# Patient Record
Sex: MALE | NOT HISPANIC OR LATINO | Employment: OTHER | URBAN - METROPOLITAN AREA
[De-identification: names, ages, dates, MRNs, and addresses within clinical notes are randomized per-mention and may not be internally consistent; named-entity substitution may affect disease eponyms.]

---

## 2017-08-07 DIAGNOSIS — E03.9 HYPOTHYROIDISM, UNSPECIFIED TYPE: ICD-10-CM

## 2017-08-07 NOTE — TELEPHONE ENCOUNTER
levothyroxine (SYNTHROID, LEVOTHROID) 88 MCG tablet 90 tablet 3 7/5/2016  No   Sig: Take 1 tablet (88 mcg) by mouth daily          Last Written Prescription Date: 07/05/2016  Last Quantity: 90, # refills: 3  Last Office Visit with [facility], [facility] or [facility] prescribing provider: 11/2016        TSH   Date Value Ref Range Status   06/21/2016 1.56 0.40 - 4.00 mU/L Final

## 2017-08-08 RX ORDER — LEVOTHYROXINE SODIUM 88 UG/1
TABLET ORAL
Qty: 30 TABLET | Refills: 0 | Status: SHIPPED | OUTPATIENT
Start: 2017-08-08 | End: 2017-09-28

## 2017-08-08 NOTE — TELEPHONE ENCOUNTER
Medication is being filled for 1 time refill only due to:  Patient needs to be seen because due for PE and labs.

## 2017-09-28 DIAGNOSIS — E03.9 HYPOTHYROIDISM, UNSPECIFIED TYPE: ICD-10-CM

## 2017-09-28 NOTE — TELEPHONE ENCOUNTER
Patient due for physical and labs for further refills        levothyroxine (SYNTHROID/LEVOTHROID) 88 MCG tablet     Last Written Prescription Date: 8/08/17  Last Quantity: 30, # refills: 0  Last Office Visit with G, P or [facility] prescribing provider: 11/03/16        TSH   Date Value Ref Range Status   06/21/2016 1.56 0.40 - 4.00 mU/L Final           Mary BOB(R)

## 2017-10-19 RX ORDER — LEVOTHYROXINE SODIUM 88 UG/1
TABLET ORAL
Qty: 30 TABLET | Refills: 0 | Status: SHIPPED | OUTPATIENT
Start: 2017-10-19 | End: 2017-11-21

## 2017-11-20 NOTE — PROGRESS NOTES
"  SUBJECTIVE:   CC: Sarkis Darnell is an 57 year old male who presents for preventative health visit.     Healthy Habits:    Do you get at least three servings of calcium containing foods daily (dairy, green leafy vegetables, etc.)? {YES/NO, DAIRY INTAKE:985431::\"yes\"}    Amount of exercise or daily activities, outside of work: {AMOUNT EXERCISE:424637}    Problems taking medications regularly {Yes /No default:307333::\"No\"}    Medication side effects: {Yes /No default.:566170::\"No\"}    Have you had an eye exam in the past two years? {YESNOBLANK:537273}    Do you see a dentist twice per year? {YESNOBLANK:718875}    Do you have sleep apnea, excessive snoring or daytime drowsiness?{YESNOBLANK:921762}    {Outside tests to abstract? :053655}    {additional problems to add (Optional):456878}    Today's PHQ-2 Score:   PHQ-2 ( 1999 Pfizer) 11/3/2016 7/5/2016   Q1: Little interest or pleasure in doing things 0 0   Q2: Feeling down, depressed or hopeless 0 0   PHQ-2 Score 0 0   Q1: Little interest or pleasure in doing things - -   Q2: Feeling down, depressed or hopeless - -   PHQ-2 Score - -     {PHQ-2 LOOK IN ASSESSMENTS :981403}  Abuse: Current or Past(Physical, Sexual or Emotional)- {YES/NO/NA:458042}  Do you feel safe in your environment - {YES/NO/NA:199302}    Social History   Substance Use Topics     Smoking status: Never Smoker     Smokeless tobacco: Never Used     Alcohol use No     {ETOH AUDIT:573752}    Last PSA:   PSA   Date Value Ref Range Status   07/29/2015 6.8 ng/mL Final       Reviewed orders with patient. Reviewed health maintenance and updated orders accordingly - {Yes/No:684585::\"Yes\"}  {Chronicprobdata (Optional):566783}    Reviewed and updated as needed this visit by clinical staff         Reviewed and updated as needed this visit by Provider        {HISTORY OPTIONS (Optional):677231}    ROS:  {MALE ROS-adult preventive care package:368936::\"C: NEGATIVE for fever, chills, change in weight\",\"I: NEGATIVE for " "worrisome rashes, moles or lesions\",\"E: NEGATIVE for vision changes or irritation\",\"ENT: NEGATIVE for ear, mouth and throat problems\",\"R: NEGATIVE for significant cough or SOB\",\"CV: NEGATIVE for chest pain, palpitations or peripheral edema\",\"GI: NEGATIVE for nausea, abdominal pain, heartburn, or change in bowel habits\",\" male: negative for dysuria, hematuria, decreased urinary stream, erectile dysfunction, urethral discharge\",\"M: NEGATIVE for significant arthralgias or myalgia\",\"N: NEGATIVE for weakness, dizziness or paresthesias\",\"P: NEGATIVE for changes in mood or affect\"}    OBJECTIVE:   There were no vitals taken for this visit.  EXAM:  {Exam Choices:679882}    ASSESSMENT/PLAN:   {Diag Picklist:785263}    COUNSELING:  {MALE COUNSELING MESSAGES:437320::\"Reviewed preventive health counseling, as reflected in patient instructions\"}    {BP Counseling- Complete if BP >= 120/80  (Optional):102975}     reports that he has never smoked. He has never used smokeless tobacco.  {Tobacco Cessation -- Complete if patient is a smoker (Optional):644767}  Estimated body mass index is 30.05 kg/(m^2) as calculated from the following:    Height as of 11/3/16: 5' 6.5\" (1.689 m).    Weight as of 11/3/16: 189 lb (85.7 kg).   {Weight Management Plan (ACO) Complete if BMI is abnormal-  Ages 18-64  BMI >24.9.  Age 65+ with BMI <23 or >30 (Optional):208438}    Counseling Resources:  ATP IV Guidelines  Pooled Cohorts Equation Calculator  FRAX Risk Assessment  ICSI Preventive Guidelines  Dietary Guidelines for Americans, 2010  USDA's MyPlate  ASA Prophylaxis  Lung CA Screening    Alejandro Lewis MD  [facility]  "

## 2017-11-21 ENCOUNTER — OFFICE VISIT (OUTPATIENT)
Dept: FAMILY MEDICINE | Facility: CLINIC | Age: 57
End: 2017-11-21
Payer: COMMERCIAL

## 2017-11-21 VITALS
BODY MASS INDEX: 30.07 KG/M2 | HEIGHT: 67 IN | OXYGEN SATURATION: 98 % | WEIGHT: 191.6 LBS | SYSTOLIC BLOOD PRESSURE: 122 MMHG | HEART RATE: 70 BPM | DIASTOLIC BLOOD PRESSURE: 78 MMHG | TEMPERATURE: 97.6 F

## 2017-11-21 DIAGNOSIS — N52.9 ERECTILE DYSFUNCTION, UNSPECIFIED ERECTILE DYSFUNCTION TYPE: ICD-10-CM

## 2017-11-21 DIAGNOSIS — E03.9 HYPOTHYROIDISM, UNSPECIFIED TYPE: ICD-10-CM

## 2017-11-21 DIAGNOSIS — E78.00 HYPERCHOLESTEREMIA: ICD-10-CM

## 2017-11-21 DIAGNOSIS — K21.9 GASTROESOPHAGEAL REFLUX DISEASE WITHOUT ESOPHAGITIS: ICD-10-CM

## 2017-11-21 DIAGNOSIS — R39.15 URINARY URGENCY: ICD-10-CM

## 2017-11-21 DIAGNOSIS — E66.9 NON MORBID OBESITY: ICD-10-CM

## 2017-11-21 DIAGNOSIS — N40.0 BENIGN NON-NODULAR PROSTATIC HYPERPLASIA WITHOUT LOWER URINARY TRACT SYMPTOMS: ICD-10-CM

## 2017-11-21 DIAGNOSIS — R97.20 ELEVATED PSA: ICD-10-CM

## 2017-11-21 DIAGNOSIS — M54.50 MIDLINE LOW BACK PAIN WITHOUT SCIATICA, UNSPECIFIED CHRONICITY: ICD-10-CM

## 2017-11-21 DIAGNOSIS — E55.9 VITAMIN D DEFICIENCY: ICD-10-CM

## 2017-11-21 DIAGNOSIS — Z00.00 ROUTINE GENERAL MEDICAL EXAMINATION AT A HEALTH CARE FACILITY: Primary | ICD-10-CM

## 2017-11-21 DIAGNOSIS — R19.4 BOWEL HABIT CHANGES: ICD-10-CM

## 2017-11-21 LAB
ALBUMIN SERPL-MCNC: 3.6 G/DL (ref 3.4–5)
ALBUMIN UR-MCNC: NEGATIVE MG/DL
ALP SERPL-CCNC: 94 U/L (ref 40–150)
ALT SERPL W P-5'-P-CCNC: 27 U/L (ref 0–70)
ANION GAP SERPL CALCULATED.3IONS-SCNC: 8 MMOL/L (ref 3–14)
APPEARANCE UR: CLEAR
AST SERPL W P-5'-P-CCNC: 17 U/L (ref 0–45)
BILIRUB SERPL-MCNC: 0.3 MG/DL (ref 0.2–1.3)
BILIRUB UR QL STRIP: NEGATIVE
BUN SERPL-MCNC: 15 MG/DL (ref 7–30)
CALCIUM SERPL-MCNC: 9.2 MG/DL (ref 8.5–10.1)
CHLORIDE SERPL-SCNC: 107 MMOL/L (ref 94–109)
CHOLEST SERPL-MCNC: 185 MG/DL
CO2 SERPL-SCNC: 25 MMOL/L (ref 20–32)
COLOR UR AUTO: YELLOW
CREAT SERPL-MCNC: 0.97 MG/DL (ref 0.66–1.25)
DEPRECATED CALCIDIOL+CALCIFEROL SERPL-MC: 21 UG/L (ref 20–75)
ERYTHROCYTE [DISTWIDTH] IN BLOOD BY AUTOMATED COUNT: 13.3 % (ref 10–15)
GFR SERPL CREATININE-BSD FRML MDRD: 80 ML/MIN/1.7M2
GLUCOSE SERPL-MCNC: 95 MG/DL (ref 70–99)
GLUCOSE UR STRIP-MCNC: NEGATIVE MG/DL
HCT VFR BLD AUTO: 43 % (ref 40–53)
HDLC SERPL-MCNC: 42 MG/DL
HGB BLD-MCNC: 14.8 G/DL (ref 13.3–17.7)
HGB UR QL STRIP: NEGATIVE
KETONES UR STRIP-MCNC: NEGATIVE MG/DL
LDLC SERPL CALC-MCNC: 124 MG/DL
LEUKOCYTE ESTERASE UR QL STRIP: NEGATIVE
MCH RBC QN AUTO: 30.1 PG (ref 26.5–33)
MCHC RBC AUTO-ENTMCNC: 34.4 G/DL (ref 31.5–36.5)
MCV RBC AUTO: 87 FL (ref 78–100)
NITRATE UR QL: NEGATIVE
NONHDLC SERPL-MCNC: 143 MG/DL
PH UR STRIP: 6 PH (ref 5–7)
PLATELET # BLD AUTO: 248 10E9/L (ref 150–450)
POTASSIUM SERPL-SCNC: 4.1 MMOL/L (ref 3.4–5.3)
PROT SERPL-MCNC: 7.2 G/DL (ref 6.8–8.8)
PSA SERPL-ACNC: 7.07 UG/L (ref 0–4)
RBC # BLD AUTO: 4.92 10E12/L (ref 4.4–5.9)
SODIUM SERPL-SCNC: 140 MMOL/L (ref 133–144)
SOURCE: NORMAL
SP GR UR STRIP: 1.02 (ref 1–1.03)
TRIGL SERPL-MCNC: 97 MG/DL
TSH SERPL DL<=0.005 MIU/L-ACNC: 2.24 MU/L (ref 0.4–4)
UROBILINOGEN UR STRIP-ACNC: 0.2 EU/DL (ref 0.2–1)
WBC # BLD AUTO: 6.1 10E9/L (ref 4–11)

## 2017-11-21 PROCEDURE — 80053 COMPREHEN METABOLIC PANEL: CPT | Performed by: INTERNAL MEDICINE

## 2017-11-21 PROCEDURE — 84443 ASSAY THYROID STIM HORMONE: CPT | Performed by: INTERNAL MEDICINE

## 2017-11-21 PROCEDURE — 85027 COMPLETE CBC AUTOMATED: CPT | Performed by: INTERNAL MEDICINE

## 2017-11-21 PROCEDURE — G0103 PSA SCREENING: HCPCS | Performed by: INTERNAL MEDICINE

## 2017-11-21 PROCEDURE — 81003 URINALYSIS AUTO W/O SCOPE: CPT | Performed by: INTERNAL MEDICINE

## 2017-11-21 PROCEDURE — 36415 COLL VENOUS BLD VENIPUNCTURE: CPT | Performed by: INTERNAL MEDICINE

## 2017-11-21 PROCEDURE — 99396 PREV VISIT EST AGE 40-64: CPT | Performed by: INTERNAL MEDICINE

## 2017-11-21 PROCEDURE — 99212 OFFICE O/P EST SF 10 MIN: CPT | Mod: 25 | Performed by: INTERNAL MEDICINE

## 2017-11-21 PROCEDURE — 82306 VITAMIN D 25 HYDROXY: CPT | Performed by: INTERNAL MEDICINE

## 2017-11-21 PROCEDURE — 80061 LIPID PANEL: CPT | Performed by: INTERNAL MEDICINE

## 2017-11-21 RX ORDER — LEVOTHYROXINE SODIUM 88 UG/1
TABLET ORAL
Qty: 90 TABLET | Refills: 3 | Status: SHIPPED | OUTPATIENT
Start: 2017-11-21 | End: 2018-11-29

## 2017-11-21 NOTE — LETTER
[place] [address] [facility]  [address]  [place]  [address]  Tel: [phone]    November 22, 2017    Sarkis Darnell  [address] [facility] [address]        Dear Mr. Darnell,    I am happy to report that your cbc or complete blood count is normal with no signs of anemia, leukemia or platelet abnormalities.  Your chemistry panel shows no signs of diabetes.  Your blood salts, kidney tests, liver tests, thyroid test, vitamin D level, urine test, and proteins are all fine.  Your psa is just a bit higher this time but not a lot.  2 years ago it was 6.8.  I am not worried but let's have you come back and repeat this in 6 months to be safe.    Your total cholesterol is 185 with the normal range being below 200.  Your HDL or good cholesterol is 42 with the normal range being above 40.  Your LDL or bad cholesterol is 124 with the normal range being below 130.  Overall these numbers are fine.    I am happy to bring you this overall excellent report.  Please be sure to exercise and try to get your weight down.  Please follow up in 6 months for the psa test, just call before you come.      If you have any further questions or problems, please contact our office.      Sincerely,    Alejandro Lewis MD/ Paola Arriaza CMA  Results for orders placed or performed in visit on 11/21/17   CBC with platelets   Result Value Ref Range    WBC 6.1 4.0 - 11.0 10e9/L    RBC Count 4.92 4.4 - 5.9 10e12/L    Hemoglobin 14.8 13.3 - 17.7 g/dL    Hematocrit 43.0 40.0 - 53.0 %    MCV 87 78 - 100 fl    MCH 30.1 26.5 - 33.0 pg    MCHC 34.4 31.5 - 36.5 g/dL    RDW 13.3 10.0 - 15.0 %    Platelet Count 248 150 - 450 10e9/L   Comprehensive metabolic panel   Result Value Ref Range    Sodium 140 133 - 144 mmol/L    Potassium 4.1 3.4 - 5.3 mmol/L    Chloride 107 94 - 109 mmol/L    Carbon Dioxide 25 20 - 32 mmol/L    Anion Gap 8 3 - 14 mmol/L    Glucose 95 70 - 99 mg/dL    Urea Nitrogen 15 7 - 30 mg/dL    Creatinine 0.97 0.66 - 1.25  mg/dL    GFR Estimate 80 >60 mL/min/1.7m2    GFR Estimate If Black >90 >60 mL/min/1.7m2    Calcium 9.2 8.5 - 10.1 mg/dL    Bilirubin Total 0.3 0.2 - 1.3 mg/dL    Albumin 3.6 3.4 - 5.0 g/dL    Protein Total 7.2 6.8 - 8.8 g/dL    Alkaline Phosphatase 94 40 - 150 U/L    ALT 27 0 - 70 U/L    AST 17 0 - 45 U/L   Lipid panel reflex to direct LDL Non-fasting   Result Value Ref Range    Cholesterol 185 <200 mg/dL    Triglycerides 97 <150 mg/dL    HDL Cholesterol 42 >39 mg/dL    LDL Cholesterol Calculated 124 (H) <100 mg/dL    Non HDL Cholesterol 143 (H) <130 mg/dL   Prostate spec antigen screen   Result Value Ref Range    PSA 7.07 (H) 0 - 4 ug/L   Vitamin D Deficiency   Result Value Ref Range    Vitamin D Deficiency screening 21 20 - 75 ug/L   TSH with free T4 reflex   Result Value Ref Range    TSH 2.24 0.40 - 4.00 mU/L   *UA reflex to Microscopic   Result Value Ref Range    Color Urine Yellow     Appearance Urine Clear     Glucose Urine Negative NEG^Negative mg/dL    Bilirubin Urine Negative NEG^Negative    Ketones Urine Negative NEG^Negative mg/dL    Specific Gravity Urine 1.025 1.003 - 1.035    Blood Urine Negative NEG^Negative    pH Urine 6.0 5.0 - 7.0 pH    Protein Albumin Urine Negative NEG^Negative mg/dL    Urobilinogen Urine 0.2 0.2 - 1.0 EU/dL    Nitrite Urine Negative NEG^Negative    Leukocyte Esterase Urine Negative NEG^Negative    Source Midstream Urine                Enclosure: Lab Results

## 2017-11-21 NOTE — MR AVS SNAPSHOT
After Visit Summary   11/21/2017    Sarkis Darnell    MRN: [id number]           Patient Information     Date Of Birth          1960        Visit Information        Provider Department      11/21/2017 8:30 AM Alejandro Lewis MD [facility]        Today's Diagnoses     Routine general medical examination at a health care facility    -  1    Vitamin D deficiency        Hypothyroidism, unspecified type        Gastroesophageal reflux disease without esophagitis        Erectile dysfunction, unspecified erectile dysfunction type        Elevated PSA        Benign non-nodular prostatic hyperplasia without lower urinary tract symptoms        Hypercholesteremia        Non morbid obesity        Midline low back pain without sciatica, unspecified chronicity        Urinary urgency        Bowel habit changes          Care Instructions      Preventive Health Recommendations  Male Ages 50 - 64    Yearly exam:             See your health care provider every year in order to  o   Review health changes.   o   Discuss preventive care.    o   Review your medicines if your doctor has prescribed any.     Have a cholesterol test every 5 years, or more frequently if you are at risk for high cholesterol/heart disease.     Have a diabetes test (fasting glucose) every three years. If you are at risk for diabetes, you should have this test more often.     Have a colonoscopy at age 50, or have a yearly FIT test (stool test). These exams will check for colon cancer.      Talk with your health care provider about whether or not a prostate cancer screening test (PSA) is right for you.    You should be tested each year for STDs (sexually transmitted diseases), if you re at risk.     Shots: Get a flu shot each year. Get a tetanus shot every 10 years.     Nutrition:    Eat at least 5 servings of fruits and vegetables daily.     Eat whole-grain bread, whole-wheat pasta and brown rice instead of white grains and rice.  "    Talk to your provider about Calcium and Vitamin D.     Lifestyle    Exercise for at least 150 minutes a week (30 minutes a day, 5 days a week). This will help you control your weight and prevent disease.     Limit alcohol to one drink per day.     No smoking.     Wear sunscreen to prevent skin cancer.     See your dentist every six months for an exam and cleaning.     See your eye doctor every 1 to 2 years.            Follow-ups after your visit        Who to contact     If you have questions or need follow up information about today's clinic visit or your schedule please contact [facility] directly at [phone].  Normal or non-critical lab and imaging results will be communicated to you by [employer], letter or phone within 4 business days after the clinic has received the results. If you do not hear from us within 7 days, please contact the clinic through [employer] or phone. If you have a critical or abnormal lab result, we will notify you by phone as soon as possible.  Submit refill requests through [employer] or call your pharmacy and they will forward the refill request to us. Please allow 3 business days for your refill to be completed.          Additional Information About Your Visit        [employer] Information     [employer] gives you secure access to your electronic health record. If you see a primary care provider, you can also send messages to your care team and make appointments. If you have questions, please call your primary care clinic.  If you do not have a primary care provider, please call [phone] and they will assist you.        Care EveryWhere ID     This is your Care EveryWhere ID. This could be used by other organizations to access your [facility] medical records  [id number]        Your Vitals Were     Pulse Temperature Height Pulse Oximetry BMI (Body Mass Index)       70 97.6  F (36.4  C) (Oral) 5' 6.5\" (1.689 m) 98% 30.46 kg/m2        Blood Pressure from Last 3 Encounters: "   11/21/17 122/78   11/03/16 101/66   07/05/16 122/75    Weight from Last 3 Encounters:   11/21/17 191 lb 9.6 oz (86.9 kg)   11/03/16 189 lb (85.7 kg)   07/05/16 185 lb 8 oz (84.1 kg)              We Performed the Following     *UA reflex to Microscopic     CBC with platelets     Comprehensive metabolic panel     Lipid panel reflex to direct LDL Non-fasting     OFFICE/OUTPT VISIT,EST,LEVL II     Prostate spec antigen screen     TSH with free T4 reflex     Vitamin D Deficiency          Today's Medication Changes          These changes are accurate as of: 11/21/17  9:04 AM.  If you have any questions, ask your nurse or doctor.               Stop taking these medicines if you haven't already. Please contact your care team if you have questions.     vitamin D 1000 UNITS capsule   Stopped by:  Alejandro Lewis MD                Where to get your medicines      These medications were sent to [id number] - [place], [place] - [address] AT [facility] [place] [facility]  [address], [place] [address]    Hours:  24-hours Phone:  [phone]     levothyroxine 88 MCG tablet                Primary Care Provider Office Phone # Fax #    Alejandro Lewis [phone] [address] [address]        Equal Access to Services     [facility] AH: Hadii aad ku hadasho Soomaali, waaxda luqadaha, qaybta kaalmada adeegyada, beatriz castaneda hayanni de la fuente . So [facility] [phone].    ATENCIÓN: Si habla español, tiene a charles disposición servicios gratuitos de asistencia lingüística. Alexia al [phone].    We comply with applicable federal civil rights laws and [place] laws. We do not discriminate on the basis of race, color, national origin, age, disability, sex, sexual orientation, or gender identity.            Thank you!     Thank you for choosing [facility]  for your care. Our goal is always to provide you with excellent care. Hearing  back from our patients is one way we can continue to improve our services. Please take a few minutes to complete the written survey that you may receive in the mail after your visit with us. Thank you!             Your Updated Medication List - Protect others around you: Learn how to safely use, store and throw away your medicines at www.disposemymeds.org.          This list is accurate as of: 11/21/17  9:04 AM.  Always use your most recent med list.                   Brand Name Dispense Instructions for use Diagnosis    levothyroxine 88 MCG tablet    SYNTHROID/LEVOTHROID    90 tablet    TAKE 1 TABLET(88 MCG) BY MOUTH DAILY    Hypothyroidism, unspecified type

## 2017-11-21 NOTE — PROGRESS NOTES
SUBJECTIVE:   CC: Sarkis Darnell is an 57 year old male who presents for preventative health visit.     He has multiple issues to discuss today    1. Occasionally low back paiin, low back paiin, not radiating, no leg t/n/w or loss of control of b/b.  NO f,c,s.  Comes and goes.  Occasionally mid back pain, not often    2. Bulge in abdomen when does sit ups, no pain    3. Occasionally hard area near umbilicus, no n/v, no fevers, chills or night sweats or weight loss.    4. Bowel movement consistency change, since lap renetta, no b/b stools    5. Urinary urgency at times, no b/b or dc.    He is not working out reg, weight an issue.      Physical   Annual:     Getting at least 3 servings of Calcium per day::  NO    Bi-annual eye exam::  Yes    Dental care twice a year::  Yes    Sleep apnea or symptoms of sleep apnea::  Daytime drowsiness    Diet::  Regular (no restrictions) and Other    Frequency of exercise::  1 day/week    Duration of exercise::  N/A    Taking medications regularly::  Yes    Medication side effects::  None    Additional concerns today::  No              Today's PHQ-2 Score:   PHQ-2 ( 1999 Pfizer) 11/21/2017   Q1: Little interest or pleasure in doing things 0   Q2: Feeling down, depressed or hopeless 0   PHQ-2 Score 0   Q1: Little interest or pleasure in doing things Not at all   Q2: Feeling down, depressed or hopeless Not at all   PHQ-2 Score 0       Abuse: Current or Past(Physical, Sexual or Emotional)- No  Do you feel safe in your environment - Yes    Social History   Substance Use Topics     Smoking status: Never Smoker     Smokeless tobacco: Never Used     Alcohol use No     The patient does not drink >3 drinks per day nor >7 drinks per week.                Past Medical History:      Past Medical History:   Diagnosis Date     BPH (benign prostatic hyperplasia) 2016    Dr. Gonzalez     Chest pain 1/14    neg est echo     Elevated PSA 2016    Dr. Gonzalez, bx neg     Erectile dysfunction     Dr. Gonzalez      Gastritis      Hypercholesteremia      Hypothyroid 2007     Normal colonoscopy 2010     Vitamin D deficiency 2009    Vitamin D2/D3 of 19             Past Surgical History:      Past Surgical History:   Procedure Laterality Date     LAPAROSCOPIC CHOLECYSTECTOMY N/A 11/16/2015    Procedure: LAPAROSCOPIC CHOLECYSTECTOMY;  Surgeon: Sivakumar Burr MD;  Location: [facility]             Social History:     Social History     Social History     Marital status:      Spouse name: N/A     Number of children: 2     Years of education: N/A     Occupational History      [employer]     Social History Main Topics     Smoking status: Never Smoker     Smokeless tobacco: Never Used     Alcohol use No     Drug use: No     Sexual activity: Not Currently     Other Topics Concern     Not on file     Social History Narrative             Family History:   reviewed         Allergies:     Allergies   Allergen Reactions     Dust Mites      Sneezing, water eyes             Medications:     Current Outpatient Prescriptions   Medication Sig Dispense Refill     levothyroxine (SYNTHROID/LEVOTHROID) 88 MCG tablet TAKE 1 TABLET(88 MCG) BY MOUTH DAILY 30 tablet 0     VITAMIN D 1000 UNIT OR CAPS 1 CAPSULE DAILY 3 MONTHS 1 YEAR               Review of Systems:   The 10 point Review of Systems is negative other than noted in the HPI           Physical Exam:   There were no vitals taken for this visit.    Exam:  Constitutional: healthy appearing, alert and in no distress  Heent: Normocephalic. Head without obvious masses or lesions. PERRLDC, EOMI. Mouth exam within normal limits: tongue, mucous membranes, posterior pharynx all normal, no lesions or abnormalities seen.  Tm's and canals within normal limits bilaterally. Neck supple, no nuchal rigidity or masses. No supraclavicular, or cervical adenopathy. Thyroid symmetric, no masses.  Cardiovascular: Regular rate and rhythm, no murmer, rub or gallops.  JVP not elevated, no edema.   Carotids within normal limits bilaterally, no bruits.  Respiratory: Normal respiratory effort.  Lungs clear, normal flow, no wheezing or crackles.  Breasts: Normal bilaterally.  No masses or lesions.  Nipples within normal limites.  No axillary lesions or nodes.  Gastrointestinal: Normal active bowel sounds.   Soft, not tender, no masses, guarding or rebound.  No hepatosplenomegaly.  Has rectus diastasis, no other lesions or masses  Genitourinary: Rectal mod bph  Musculoskeletal: extremities normal, no gross deformities noted.  Skin: no suspicious lesions or rashes   Neurologic: Mental status within normal limits.  Speech fluent.  No gross motor abnormalities and gait intact.  Psychiatric: mentation appears normal and affect normal.         Data:   Labs sent        Assessment:   1. Normal complete physical exam  2. Elevated psa, follow up labs today  3. Vit d defic, follow up labs today  4. Hypothyroidism, follow up labs today  5. Benign prostatic hypertrophy, stable  6. Elevated cholesterol, not on med, follow up labs, exercise, diet  7. Gerd, no issues  8. Health care maintenance  9. Low back paiin, suspect msk, doubt tumor, spine lesion, gi or genitourinary cause  10. Urinary urgency, suspect benign prostatic hypertrophy, follow up ua  11. Bowel movement change, due to surgery, doubt malig cause, up to date colon exam           Plan:   Exercise, diet  Labs today   Letter with results  Stressed weight loss  For low back paiin weight loss and exercise  Call if change in abdomen symptoms or bowel changes otherwise      Alejandro Lewis M.D.                                Review of Systems      OBJECTIVE:   There were no vitals taken for this visit.    Physical Exam    ASSESSMENT/PLAN:

## 2017-11-21 NOTE — NURSING NOTE
"Chief Complaint   Patient presents with     Physical       Initial /78 (BP Location: Left arm, Patient Position: Chair, Cuff Size: Adult Large)  Pulse 70  Temp 97.6  F (36.4  C) (Oral)  Ht 5' 6.5\" (1.689 m)  Wt 191 lb 9.6 oz (86.9 kg)  SpO2 98%  BMI 30.46 kg/m2 Estimated body mass index is 30.46 kg/(m^2) as calculated from the following:    Height as of this encounter: 5' 6.5\" (1.689 m).    Weight as of this encounter: 191 lb 9.6 oz (86.9 kg).  Medication Reconciliation: complete.  Tiffany Mast CMA    "

## 2017-11-22 NOTE — PROGRESS NOTES
Mr. Garcia,    It was a pleasure seeing you for your physical examination.  I wanted to get back to you with your test results.  I have enclosed a copy for your review.      I am happy to report that your cbc or complete blood count is normal with no signs of anemia, leukemia or platelet abnormalities.  Your chemistry panel shows no signs of diabetes.  Your blood salts, kidney tests, liver tests, thyroid test, vitamin D level, urine test, and proteins are all fine.  Your psa is just a bit higher this time but not a lot.  2 years ago it was 6.8.  I am not worried but let's have you come back and repeat this in 6 months to be safe.    Your total cholesterol is 185 with the normal range being below 200.  Your HDL or good cholesterol is 42 with the normal range being above 40.  Your LDL or bad cholesterol is 124 with the normal range being below 130.  Overall these numbers are fine.    I am happy to bring you this overall excellent report.  Please be sure to exercise and try to get your weight down.  Please follow up in 6 months for the psa test, just call before you come.      If you have any questions please call me.    Alejandro Lewis M.D.

## 2018-05-02 ENCOUNTER — OFFICE VISIT (OUTPATIENT)
Dept: UROLOGY | Facility: CLINIC | Age: 58
End: 2018-05-02
Payer: COMMERCIAL

## 2018-05-02 VITALS
HEIGHT: 67 IN | SYSTOLIC BLOOD PRESSURE: 120 MMHG | WEIGHT: 188 LBS | DIASTOLIC BLOOD PRESSURE: 79 MMHG | BODY MASS INDEX: 29.51 KG/M2

## 2018-05-02 DIAGNOSIS — R97.20 ELEVATED PROSTATE SPECIFIC ANTIGEN (PSA): Primary | ICD-10-CM

## 2018-05-02 LAB
ALBUMIN UR-MCNC: NEGATIVE MG/DL
APPEARANCE UR: CLEAR
BILIRUB UR QL STRIP: NEGATIVE
COLOR UR AUTO: YELLOW
GLUCOSE UR STRIP-MCNC: NEGATIVE MG/DL
HGB UR QL STRIP: NEGATIVE
KETONES UR STRIP-MCNC: NEGATIVE MG/DL
LEUKOCYTE ESTERASE UR QL STRIP: NEGATIVE
NITRATE UR QL: NEGATIVE
PH UR STRIP: 7 PH (ref 5–7)
SOURCE: NORMAL
SP GR UR STRIP: 1.01 (ref 1–1.03)
UROBILINOGEN UR STRIP-ACNC: 0.2 EU/DL (ref 0.2–1)

## 2018-05-02 PROCEDURE — 99202 OFFICE O/P NEW SF 15 MIN: CPT | Performed by: UROLOGY

## 2018-05-02 PROCEDURE — 81003 URINALYSIS AUTO W/O SCOPE: CPT | Performed by: UROLOGY

## 2018-05-02 ASSESSMENT — PAIN SCALES - GENERAL: PAINLEVEL: NO PAIN (0)

## 2018-05-02 NOTE — LETTER
5/2/2018       RE: Sarkis Darnell  [address] [facility]  [place] [address]     Dear Colleague,    Thank you for referring your patient, Sarkis Darnell, to the [facility] [place] [facility]. Please see a copy of my visit note below.    Sarkis Darnell is a 57-year-old male who I I've seen in the past regarding erectile dysfunction, elevated PSA.  Most recent PSA was 7.07 last October. This was drawn after digital rectal exam. Patient has no family history of prostate cancer in his voiding comfortably, with mild urgency occasionally. He notices this after he drinks caffeinated tea.  His erections with oral medication have been unsatisfactory. He is unable to obtain enough rigidity for intercourse and maintain the erection. He would like to consider injection therapy as an option.  Other past medical history: Gastritis, high cholesterol, hypothyroidism, vitamin D deficiency, laparoscopic cholecystectomy, nonsmoker  Family history: Hypertension, esophageal cancer, throat cancer, liver cancer, ovarian cancer  Medications: Synthroid  Allergies: Dust mites  Exam: Normal appearance, normal vital signs, alert and oriented, normocephalic, normal respirations, neuro grossly intact. Normal sphincter tone, no rectal mass or impaction, benign feeling prostate, normal seminal vesicles. Estimated prostate size to be 50 cc  Assessment: Erectile dysfunction, elevated PSA-discussed options for further evaluation. Believe his PSA elevation is related to his prostate size. Discussed arterial insufficiency and venous leak phenomenon as possible causes of erectile dysfunction  Plan: Trial of injection therapy-will teach on return and repeat PSA. No ejaculations for 5 days prior to PSA    Sincerely,    Micah Gonzalez MD

## 2018-05-02 NOTE — MR AVS SNAPSHOT
After Visit Summary   5/2/2018    Sarkis Darnell    MRN: [id number]           Patient Information     Date Of Birth          1960        Visit Information        Provider Department      5/2/2018 4:00 PM Micah Gonzalez MD [facility] [place]        Today's Diagnoses     Elevated prostate specific antigen (PSA)    -  1       Follow-ups after your visit        Future tests that were ordered for you today     Open Future Orders        Priority Expected Expires Ordered    PSA Diag Urologic Phys Routine  7/31/2018 5/2/2018            Who to contact     If you have questions or need follow up information about today's clinic visit or your schedule please contact [facility] [place] directly at [phone].  Normal or non-critical lab and imaging results will be communicated to you by Aquaporinhart, letter or phone within 4 business days after the clinic has received the results. If you do not hear from us within 7 days, please contact the clinic through Flybitst or phone. If you have a critical or abnormal lab result, we will notify you by phone as soon as possible.  Submit refill requests through [employer] or call your pharmacy and they will forward the refill request to us. Please allow 3 business days for your refill to be completed.          Additional Information About Your Visit        MyChart Information     [employer] gives you secure access to your electronic health record. If you see a primary care provider, you can also send messages to your care team and make appointments. If you have questions, please call your primary care clinic.  If you do not have a primary care provider, please call [phone] and they will assist you.        Care EveryWhere ID     This is your Care EveryWhere ID. This could be used by other organizations to access your [place] medical records  [id number]        Your Vitals Were     Height BMI (Body Mass  "Index)                1.689 m (5' 6.5\") 29.89 kg/m2           Blood Pressure from Last 3 Encounters:   05/02/18 120/79   11/21/17 122/78   11/03/16 101/66    Weight from Last 3 Encounters:   05/02/18 85.3 kg (188 lb)   11/21/17 86.9 kg (191 lb 9.6 oz)   11/03/16 85.7 kg (189 lb)              We Performed the Following     UA without Microscopic        Primary Care Provider Office Phone # Fax #    Alejandro Lewis [phone] [address]  [facility] [address]        Equal Access to Services     RAFAEL SHEPARD : Constantine Gallegos, cheli vazquez, kirsten magaña, beatriz de la fuente . So [facility] [phone].    ATENCIÓN: Si habla español, tiene a charles disposición servicios gratuitos de asistencia lingüística. Llame al [phone].    We comply with applicable federal civil rights laws and [place] laws. We do not discriminate on the basis of race, color, national origin, age, disability, sex, sexual orientation, or gender identity.            Thank you!     Thank you for choosing [facility] [place]  for your care. Our goal is always to provide you with excellent care. Hearing back from our patients is one way we can continue to improve our services. Please take a few minutes to complete the written survey that you may receive in the mail after your visit with us. Thank you!             Your Updated Medication List - Protect others around you: Learn how to safely use, store and throw away your medicines at www.disposemymeds.org.          This list is accurate as of 5/2/18  5:14 PM.  Always use your most recent med list.                   Brand Name Dispense Instructions for use Diagnosis    levothyroxine 88 MCG tablet    SYNTHROID/LEVOTHROID    90 tablet    TAKE 1 TABLET(88 MCG) BY MOUTH DAILY    Hypothyroidism, unspecified type         "

## 2018-07-20 DIAGNOSIS — R97.20 ELEVATED PROSTATE SPECIFIC ANTIGEN (PSA): Primary | ICD-10-CM

## 2018-07-25 ENCOUNTER — OFFICE VISIT (OUTPATIENT)
Dept: UROLOGY | Facility: CLINIC | Age: 58
End: 2018-07-25
Payer: COMMERCIAL

## 2018-07-25 VITALS
DIASTOLIC BLOOD PRESSURE: 62 MMHG | OXYGEN SATURATION: 99 % | HEART RATE: 75 BPM | WEIGHT: 187 LBS | BODY MASS INDEX: 29.73 KG/M2 | SYSTOLIC BLOOD PRESSURE: 114 MMHG

## 2018-07-25 DIAGNOSIS — R97.20 ELEVATED PROSTATE SPECIFIC ANTIGEN (PSA): ICD-10-CM

## 2018-07-25 LAB
ALBUMIN UR-MCNC: NEGATIVE MG/DL
APPEARANCE UR: CLEAR
BILIRUB UR QL STRIP: NEGATIVE
COLOR UR AUTO: YELLOW
GLUCOSE UR STRIP-MCNC: NEGATIVE MG/DL
HGB UR QL STRIP: NEGATIVE
KETONES UR STRIP-MCNC: NEGATIVE MG/DL
LEUKOCYTE ESTERASE UR QL STRIP: NEGATIVE
NITRATE UR QL: NEGATIVE
PH UR STRIP: 7 PH (ref 5–7)
PSA SERPL-MCNC: 7.6 NG/ML (ref 0–4)
SOURCE: NORMAL
SP GR UR STRIP: 1.01 (ref 1–1.03)
UROBILINOGEN UR STRIP-ACNC: 0.2 EU/DL (ref 0.2–1)

## 2018-07-25 PROCEDURE — 81003 URINALYSIS AUTO W/O SCOPE: CPT | Performed by: UROLOGY

## 2018-07-25 PROCEDURE — 99213 OFFICE O/P EST LOW 20 MIN: CPT | Performed by: UROLOGY

## 2018-07-25 PROCEDURE — 84153 ASSAY OF PSA TOTAL: CPT | Performed by: UROLOGY

## 2018-07-25 PROCEDURE — 36415 COLL VENOUS BLD VENIPUNCTURE: CPT | Performed by: UROLOGY

## 2018-07-25 ASSESSMENT — PAIN SCALES - GENERAL: PAINLEVEL: NO PAIN (0)

## 2018-07-25 NOTE — PROGRESS NOTES
Sarkis Darnell is a 58-year-old male with erectile dysfunction that returns for injection therapy teaching as well as repeating his PSA. PSA was 7.07 last fall. There is no family history of prostate cancer. Biopsies of the prostate in 2016 were normal.  Other past medical history: History of gastritis, hypercholesterolemia, hypothyroidism, vitamin D deficiency, chest pain, BPH, laparoscopic cholecystectomy, nonsmoker  Medications: Synthroid  Allergies: Dust mites  Exam: Alert and oriented, normocephalic, normal respirations, neuro grossly intact. Normal circumcised phallus and scrotum. Patient shown technique for injection of 0.1 cc of Trimix. Patient exhibited excellent technique. There was good tumescence 10 minutes after injection.  Assessment #1 erectile dysfunction-discussed dosing, side effects, priapism  #2 elevated PSA-7.6 today. Recommend 3 Dayna MRI of prostate

## 2018-07-25 NOTE — LETTER
7/25/2018       RE: Sarkis Darnell  [address]  [place] [address]     Dear Colleague,    Thank you for referring your patient, Sarkis Darnell, to the [facility] [place] at [facility]. Please see a copy of my visit note below.    Sarkis Darnell is a 58-year-old male with erectile dysfunction that returns for injection therapy teaching as well as repeating his PSA. PSA was 7.07 last fall. There is no family history of prostate cancer. Biopsies of the prostate in 2016 were normal.  Other past medical history: History of gastritis, hypercholesterolemia, hypothyroidism, vitamin D deficiency, chest pain, BPH, laparoscopic cholecystectomy, nonsmoker  Medications: Synthroid  Allergies: Dust mites  Exam: Alert and oriented, normocephalic, normal respirations, neuro grossly intact. Normal circumcised phallus and scrotum. Patient shown technique for injection of 0.1 cc of Trimix. Patient exhibited excellent technique. There was good tumescence 10 minutes after injection.  Assessment #1 erectile dysfunction-discussed dosing, side effects, priapism  #2 elevated PSA-7.6 today. Recommend 3 Dayna MRI of prostate    Again, thank you for allowing me to participate in the care of your patient.      Sincerely,    Micah Gonzalez MD

## 2018-07-25 NOTE — MR AVS SNAPSHOT
After Visit Summary   7/25/2018    Sarkis Darnell    MRN: [id number]           Patient Information     Date Of Birth          1960        Visit Information        Provider Department      7/25/2018 3:30 PM Micah Gonzalez MD [facility] [place]        Today's Diagnoses     Elevated prostate specific antigen (PSA)           Follow-ups after your visit        Future tests that were ordered for you today     Open Future Orders        Priority Expected Expires Ordered    MR Prostate wo & w Conrast Routine  7/25/2019 7/25/2018            Who to contact     If you have questions or need follow up information about today's clinic visit or your schedule please contact [facility] [place] directly at [phone].  Normal or non-critical lab and imaging results will be communicated to you by Resolverhart, letter or phone within 4 business days after the clinic has received the results. If you do not hear from us within 7 days, please contact the clinic through [email or web address] or phone. If you have a critical or abnormal lab result, we will notify you by phone as soon as possible.  Submit refill requests through Oxatis or call your pharmacy and they will forward the refill request to us. Please allow 3 business days for your refill to be completed.          Additional Information About Your Visit        MyChart Information     [employer] gives you secure access to your electronic health record. If you see a primary care provider, you can also send messages to your care team and make appointments. If you have questions, please call your primary care clinic.  If you do not have a primary care provider, please call [phone] and they will assist you.        Care EveryWhere ID     This is your Care EveryWhere ID. This could be used by other organizations to access your [place] medical records  [id number]        Your Vitals Were     Pulse Pulse Oximetry  BMI (Body Mass Index)             75 99% 29.73 kg/m2          Blood Pressure from Last 3 Encounters:   07/25/18 114/62   05/02/18 120/79   11/21/17 122/78    Weight from Last 3 Encounters:   07/25/18 84.8 kg (187 lb)   05/02/18 85.3 kg (188 lb)   11/21/17 86.9 kg (191 lb 9.6 oz)              We Performed the Following     PSA Diag Urologic Phys     UA without Microscopic        Primary Care Provider Office Phone # Fax #    Alejandro Lewis [phone] [address] [facility] [address]  [facility] [address]        Equal Access to Services     RAFAEL SHEPARD : Hadii kinjal Gallegos, wamaximilian vazquez, kirsten kaalmada archana, beatriz de la fuente . So [facility] [phone].    ATENCIÓN: Si habla español, tiene a charles disposición servicios gratuitos de asistencia lingüística. Llame al [phone].    We comply with applicable federal civil rights laws and [place] laws. We do not discriminate on the basis of race, color, national origin, age, disability, sex, sexual orientation, or gender identity.            Thank you!     Thank you for choosing [facility] [place]  for your care. Our goal is always to provide you with excellent care. Hearing back from our patients is one way we can continue to improve our services. Please take a few minutes to complete the written survey that you may receive in the mail after your visit with us. Thank you!             Your Updated Medication List - Protect others around you: Learn how to safely use, store and throw away your medicines at www.disposemymeds.org.          This list is accurate as of 7/25/18  4:37 PM.  Always use your most recent med list.                   Brand Name Dispense Instructions for use Diagnosis    levothyroxine 88 MCG tablet    SYNTHROID/LEVOTHROID    90 tablet    TAKE 1 TABLET(88 MCG) BY MOUTH DAILY    Hypothyroidism, unspecified type

## 2018-08-24 ENCOUNTER — RADIANT APPOINTMENT (OUTPATIENT)
Dept: MRI IMAGING | Facility: CLINIC | Age: 58
End: 2018-08-24
Attending: UROLOGY
Payer: COMMERCIAL

## 2018-08-24 DIAGNOSIS — R97.20 ELEVATED PROSTATE SPECIFIC ANTIGEN (PSA): ICD-10-CM

## 2018-08-24 RX ORDER — GADOBUTROL 604.72 MG/ML
7.5 INJECTION INTRAVENOUS ONCE
Status: COMPLETED | OUTPATIENT
Start: 2018-08-24 | End: 2018-08-24

## 2018-08-24 RX ADMIN — GADOBUTROL 7.5 ML: 604.72 INJECTION INTRAVENOUS at 18:21

## 2018-08-25 NOTE — DISCHARGE INSTRUCTIONS

## 2018-08-29 ENCOUNTER — TELEPHONE (OUTPATIENT)
Dept: UROLOGY | Facility: CLINIC | Age: 58
End: 2018-08-29

## 2018-08-29 DIAGNOSIS — R97.20 ELEVATED PROSTATE SPECIFIC ANTIGEN (PSA): Primary | ICD-10-CM

## 2018-08-29 NOTE — TELEPHONE ENCOUNTER
Mr. Darnell was called today and I notified his family that the MRI of his prostate is not suspicious for prostate cancer. He has a 40 cc prostate gland. He had a normal digital rectal exam in May.  I recommend that he follow-up with me in 6 months for another PSA-no ejaculations for 5 days prior.  The alternative would be to do an ultrasound of the prostate with biopsies. There could be up to 15% omentum that would have microscopic, usually low-grade prostate cancer

## 2018-09-21 ENCOUNTER — MYC MEDICAL ADVICE (OUTPATIENT)
Dept: UROLOGY | Facility: CLINIC | Age: 58
End: 2018-09-21

## 2018-11-29 DIAGNOSIS — E03.9 HYPOTHYROIDISM, UNSPECIFIED TYPE: ICD-10-CM

## 2018-11-30 NOTE — TELEPHONE ENCOUNTER
"levothyroxine (SYNTHROID/LEVOTHROID) 88 MCG tablet  Last Written Prescription Date:  11/21/17  Last Fill Quantity: 90,  # refills: 3   Last office visit: 11/21/2017 with prescribing provider:  andres   Future Office Visit:        Requested Prescriptions   Pending Prescriptions Disp Refills     levothyroxine (SYNTHROID/LEVOTHROID) 88 MCG tablet [Pharmacy Med Name: LEVOTHYROXINE 0.088MG (88MCG) TAB] 90 tablet 0     Sig: TAKE 1 TABLET(88 MCG) BY MOUTH DAILY    Thyroid Protocol Failed    11/29/2018  9:03 PM       Failed - Recent (12 mo) or future (30 days) visit within the authorizing provider's specialty    Patient had office visit in the last 12 months or has a visit in the next 30 days with authorizing provider or within the authorizing provider's specialty.  See \"Patient Info\" tab in inbasket, or \"Choose Columns\" in Meds & Orders section of the refill encounter.             Failed - Normal TSH on file in past 12 months    Recent Labs   Lab Test  11/21/17   0905   TSH  2.24             Passed - Patient is 12 years or older          "

## 2018-12-03 RX ORDER — LEVOTHYROXINE SODIUM 88 UG/1
TABLET ORAL
Qty: 30 TABLET | Refills: 0 | Status: SHIPPED | OUTPATIENT
Start: 2018-12-03 | End: 2019-01-22

## 2019-01-22 DIAGNOSIS — E03.9 HYPOTHYROIDISM, UNSPECIFIED TYPE: ICD-10-CM

## 2019-01-23 RX ORDER — LEVOTHYROXINE SODIUM 88 UG/1
TABLET ORAL
Qty: 30 TABLET | Refills: 0 | Status: SHIPPED | OUTPATIENT
Start: 2019-01-23 | End: 2019-03-04

## 2019-01-23 NOTE — TELEPHONE ENCOUNTER
Routing refill request to provider for review/approval because:  Kira given x1 and patient did not follow up, please advise  Labs not current:  TSH  Patient needs to be seen because it has been more than 1 year since last office visit.      JOHN CollinsN, RN  [employer] [facility]

## 2019-01-23 NOTE — TELEPHONE ENCOUNTER
"levothyroxine (SYNTHROID/LEVOTHROID)    Last Written Prescription Date:  12/03/2018  Last Fill Quantity: 30,  # refills: 0   Last office visit: 11/21/2017 with prescribing provider:  Debbie   Future Office Visit:  Unknown     Requested Prescriptions   Pending Prescriptions Disp Refills     levothyroxine (SYNTHROID/LEVOTHROID) 88 MCG tablet [Pharmacy Med Name: LEVOTHYROXINE 0.088MG (88MCG) TAB] 30 tablet 0     Sig: TAKE ONE TABLET BY MOUTH DAILY    Thyroid Protocol Failed - 1/22/2019  8:42 PM       Failed - Recent (12 mo) or future (30 days) visit within the authorizing provider's specialty    Patient had office visit in the last 12 months or has a visit in the next 30 days with authorizing provider or within the authorizing provider's specialty.  See \"Patient Info\" tab in inbasket, or \"Choose Columns\" in Meds & Orders section of the refill encounter.             Failed - Normal TSH on file in past 12 months    Recent Labs   Lab Test 11/21/17  0905   TSH 2.24             Passed - Patient is 12 years or older       Passed - Medication is active on med list          "

## 2019-03-04 DIAGNOSIS — E03.9 HYPOTHYROIDISM, UNSPECIFIED TYPE: ICD-10-CM

## 2019-03-04 NOTE — TELEPHONE ENCOUNTER
"levothyroxine (SYNTHROID/LEVOTHROID) 88 MCG tablet  Last Written Prescription Date:  1/23/19  Last Fill Quantity: 30,  # refills: 0   Last office visit: 11/21/2017 with prescribing provider:  Debbie   Future Office Visit:        Requested Prescriptions   Pending Prescriptions Disp Refills     levothyroxine (SYNTHROID/LEVOTHROID) 88 MCG tablet [Pharmacy Med Name: LEVOTHYROXINE 0.088MG (88MCG) TAB] 30 tablet 0     Sig: TAKE ONE TABLET BY MOUTH DAILY    Thyroid Protocol Failed - 3/4/2019  7:16 AM       Failed - Recent (12 mo) or future (30 days) visit within the authorizing provider's specialty    Patient had office visit in the last 12 months or has a visit in the next 30 days with authorizing provider or within the authorizing provider's specialty.  See \"Patient Info\" tab in inbasket, or \"Choose Columns\" in Meds & Orders section of the refill encounter.             Failed - Normal TSH on file in past 12 months    Recent Labs   Lab Test 11/21/17  0905   TSH 2.24             Passed - Patient is 12 years or older       Passed - Medication is active on med list          "

## 2019-03-05 NOTE — TELEPHONE ENCOUNTER
TCs.  Please schedule pt for annual physical and labs and route back to triage.  Thanks,  Monalisa Welch RN

## 2019-03-07 NOTE — TELEPHONE ENCOUNTER
Left message for Pt to call his doctors office.  Please schedule a physical when Pt returns call to clinic

## 2019-05-23 RX ORDER — LEVOTHYROXINE SODIUM 88 UG/1
TABLET ORAL
Qty: 30 TABLET | Refills: 0 | Status: SHIPPED | OUTPATIENT
Start: 2019-05-23 | End: 2019-06-17

## 2019-05-23 NOTE — TELEPHONE ENCOUNTER
Pt is due for annual OV. Refilled 30 day supply with note to pharmacy to inform patient to attend OV for further refills    Endy MARIE RN

## 2019-06-17 ENCOUNTER — OFFICE VISIT (OUTPATIENT)
Dept: FAMILY MEDICINE | Facility: CLINIC | Age: 59
End: 2019-06-17
Payer: COMMERCIAL

## 2019-06-17 VITALS
BODY MASS INDEX: 29.57 KG/M2 | DIASTOLIC BLOOD PRESSURE: 82 MMHG | WEIGHT: 184 LBS | SYSTOLIC BLOOD PRESSURE: 120 MMHG | TEMPERATURE: 97.9 F | OXYGEN SATURATION: 98 % | HEART RATE: 68 BPM | HEIGHT: 66 IN

## 2019-06-17 DIAGNOSIS — E55.9 VITAMIN D DEFICIENCY: ICD-10-CM

## 2019-06-17 DIAGNOSIS — N40.0 BENIGN NON-NODULAR PROSTATIC HYPERPLASIA WITHOUT LOWER URINARY TRACT SYMPTOMS: ICD-10-CM

## 2019-06-17 DIAGNOSIS — E78.00 HYPERCHOLESTEREMIA: ICD-10-CM

## 2019-06-17 DIAGNOSIS — M79.662 PAIN IN BOTH LOWER LEGS: ICD-10-CM

## 2019-06-17 DIAGNOSIS — N52.9 ERECTILE DYSFUNCTION, UNSPECIFIED ERECTILE DYSFUNCTION TYPE: ICD-10-CM

## 2019-06-17 DIAGNOSIS — Z00.00 ROUTINE GENERAL MEDICAL EXAMINATION AT A HEALTH CARE FACILITY: Primary | ICD-10-CM

## 2019-06-17 DIAGNOSIS — E03.9 HYPOTHYROIDISM, UNSPECIFIED TYPE: ICD-10-CM

## 2019-06-17 DIAGNOSIS — M79.661 PAIN IN BOTH LOWER LEGS: ICD-10-CM

## 2019-06-17 DIAGNOSIS — R97.20 ELEVATED PSA: ICD-10-CM

## 2019-06-17 LAB
ERYTHROCYTE [DISTWIDTH] IN BLOOD BY AUTOMATED COUNT: 13.8 % (ref 10–15)
HCT VFR BLD AUTO: 45.1 % (ref 40–53)
HGB BLD-MCNC: 15.3 G/DL (ref 13.3–17.7)
MCH RBC QN AUTO: 30.2 PG (ref 26.5–33)
MCHC RBC AUTO-ENTMCNC: 33.9 G/DL (ref 31.5–36.5)
MCV RBC AUTO: 89 FL (ref 78–100)
PLATELET # BLD AUTO: 292 10E9/L (ref 150–450)
RBC # BLD AUTO: 5.07 10E12/L (ref 4.4–5.9)
WBC # BLD AUTO: 8 10E9/L (ref 4–11)

## 2019-06-17 PROCEDURE — 99396 PREV VISIT EST AGE 40-64: CPT | Performed by: INTERNAL MEDICINE

## 2019-06-17 PROCEDURE — 36415 COLL VENOUS BLD VENIPUNCTURE: CPT | Performed by: INTERNAL MEDICINE

## 2019-06-17 PROCEDURE — 80061 LIPID PANEL: CPT | Performed by: INTERNAL MEDICINE

## 2019-06-17 PROCEDURE — 80053 COMPREHEN METABOLIC PANEL: CPT | Performed by: INTERNAL MEDICINE

## 2019-06-17 PROCEDURE — 84443 ASSAY THYROID STIM HORMONE: CPT | Performed by: INTERNAL MEDICINE

## 2019-06-17 PROCEDURE — 85027 COMPLETE CBC AUTOMATED: CPT | Performed by: INTERNAL MEDICINE

## 2019-06-17 PROCEDURE — 82306 VITAMIN D 25 HYDROXY: CPT | Performed by: INTERNAL MEDICINE

## 2019-06-17 PROCEDURE — 99213 OFFICE O/P EST LOW 20 MIN: CPT | Mod: 25 | Performed by: INTERNAL MEDICINE

## 2019-06-17 RX ORDER — LEVOTHYROXINE SODIUM 88 UG/1
88 TABLET ORAL DAILY
Qty: 90 TABLET | Refills: 3 | Status: SHIPPED | OUTPATIENT
Start: 2019-06-17 | End: 2019-06-17

## 2019-06-17 RX ORDER — LEVOTHYROXINE SODIUM 88 UG/1
88 TABLET ORAL DAILY
Qty: 90 TABLET | Refills: 3 | Status: SHIPPED | OUTPATIENT
Start: 2019-06-17 | End: 2020-08-31

## 2019-06-17 SDOH — HEALTH STABILITY: MENTAL HEALTH: HOW OFTEN DO YOU HAVE 6 OR MORE DRINKS ON ONE OCCASION?: NEVER

## 2019-06-17 SDOH — HEALTH STABILITY: MENTAL HEALTH: HOW OFTEN DO YOU HAVE A DRINK CONTAINING ALCOHOL?: NEVER

## 2019-06-17 ASSESSMENT — MIFFLIN-ST. JEOR: SCORE: 1592.37

## 2019-06-17 NOTE — PROGRESS NOTES
SUBJECTIVE:   CC: Sarkis Darnell is an 59 year old male who presents for preventative health visit.     Overall he feels fine but not working out reg.  He has had for years discomfort inside bilat lower legs to touch, but not otherwise and not new, no edema, no nicki.  He wonders if physical therapy would help    He also notes some hearing loss on right ear and feels plugged at times for years, occasionally after flying hurts.    He has elevated psa and prior mri and to follow up with urol soon for this.  He otherwise feels fine.        Healthy Habits:     Getting at least 3 servings of Calcium per day:  NO (1-2 servings)    Bi-annual eye exam:  Yes    Dental care twice a year:  Yes    Sleep apnea or symptoms of sleep apnea:  Excessive snoring (hasn't been tested)    Diet:  Regular (no restrictions)    Frequency of exercise:  None    Taking medications regularly:  Yes    Barriers to taking medications:  None    Medication side effects:  None    PHQ-2 Total Score: 0    Additional concerns today:  No            Today's PHQ-2 Score:   PHQ-2 ( 1999 Pfizer) 6/17/2019   Q1: Little interest or pleasure in doing things 0   Q2: Feeling down, depressed or hopeless 0   PHQ-2 Score 0   Q1: Little interest or pleasure in doing things -   Q2: Feeling down, depressed or hopeless -   PHQ-2 Score -       Abuse: Current or Past(Physical, Sexual or Emotional)- NO  Do you feel safe in your environment? YES    Social History     Tobacco Use     Smoking status: Never Smoker     Smokeless tobacco: Never Used   Substance Use Topics     Alcohol use: No     Alcohol/week: 0.0 oz     Frequency: Never     Binge frequency: Never     If you drink alcohol do you typically have >3 drinks per day or >7 drinks per week? No               Past Medical History:      Past Medical History:   Diagnosis Date     BPH (benign prostatic hyperplasia) 2016    Dr. Gonzalez     Chest pain 1/14    neg est echo     Elevated PSA 2016    Dr. Gonzalez, bx neg     Erectile  dysfunction     Dr. Gonzalez     Gastritis      Hypercholesteremia      Hypothyroid 2007     Normal colonoscopy 2010     Vitamin D deficiency 2009    Vitamin D2/D3 of 19             Past Surgical History:      Past Surgical History:   Procedure Laterality Date     LAPAROSCOPIC CHOLECYSTECTOMY N/A 11/16/2015    Procedure: LAPAROSCOPIC CHOLECYSTECTOMY;  Surgeon: Sivakumar Burr MD;  Location: [facility]     PROSTATE SURGERY      Trus BX             Social History:     Social History     Socioeconomic History     Marital status:      Spouse name: Not on file     Number of children: 2     Years of education: Not on file     Highest education level: Not on file   Occupational History     Occupation: quality engineer     Employer: [employer]     Employer: [employer]   Social Needs     Financial resource strain: Not on file     Food insecurity:     Worry: Not on file     Inability: Not on file     Transportation needs:     Medical: Not on file     Non-medical: Not on file   Tobacco Use     Smoking status: Never Smoker     Smokeless tobacco: Never Used   Substance and Sexual Activity     Alcohol use: No     Alcohol/week: 0.0 oz     Frequency: Never     Binge frequency: Never     Drug use: No     Sexual activity: Not Currently     Partners: Female   Lifestyle     Physical activity:     Days per week: Not on file     Minutes per session: Not on file     Stress: Not on file   Relationships     Social connections:     Talks on phone: Not on file     Gets together: Not on file     Attends Roman [employer] service: Not on file     Active member of club or organization: Not on file     Attends meetings of clubs or organizations: Not on file     Relationship status: Not on file     Intimate partner violence:     Fear of current or ex partner: Not on file     Emotionally abused: Not on file     Physically abused: Not on file     Forced sexual activity: Not on file   Other Topics Concern     Parent/sibling w/ CABG, MI or angioplasty  "before 65F 55M? Not Asked   Social History Narrative     Not on file             Family History:   reviewed         Allergies:     Allergies   Allergen Reactions     Dust Mites      Sneezing, water eyes             Medications:     Current Outpatient Medications   Medication Sig Dispense Refill     levothyroxine (SYNTHROID/LEVOTHROID) 88 MCG tablet Take 1 tablet (88 mcg) by mouth daily 90 tablet 3               Review of Systems:   The 10 point Review of Systems is negative other than noted in the HPI           Physical Exam:   Blood pressure 120/82, pulse 68, temperature 97.9  F (36.6  C), temperature source Oral, height 1.676 m (5' 6\"), weight 83.5 kg (184 lb), SpO2 98 %.    Exam:  Constitutional: healthy appearing, alert and in no distress  Heent: Normocephalic. Head without obvious masses or lesions. PERRLDC, EOMI. Mouth exam within normal limits: tongue, mucous membranes, posterior pharynx all normal, no lesions or abnormalities seen.  Tm's and canals within normal limits bilaterally. Neck supple, no nuchal rigidity or masses. No supraclavicular, or cervical adenopathy. Thyroid symmetric, no masses.  Cardiovascular: Regular rate and rhythm, no murmer, rub or gallops.  JVP not elevated, no edema.  Carotids within normal limits bilaterally, no bruits.  Respiratory: Normal respiratory effort.  Lungs clear, normal flow, no wheezing or crackles.  Breasts: Normal bilaterally.  No masses or lesions.  Nipples within normal limites.  No axillary lesions or nodes.  Gastrointestinal: Normal active bowel sounds.   Soft, not tender, no masses, guarding or rebound.  No hepatosplenomegaly.   Genitourinary: Rectal per urol  Musculoskeletal: extremities normal, no gross deformities noted.  Legs not red, warm and no masses  Skin: no suspicious lesions or rashes   Neurologic: Mental status within normal limits.  Speech fluent.  No gross motor abnormalities and gait intact.  Psychiatric: mentation appears normal and affect " normal.         Data:   Labs sent        Assessment:   1. Normal complete physical exam  2. Benign prostatic hypertrophy and elevated psa, follow up urol  3. E.d  4. Leg pains, cause not clear but suspect msk, will try physical therapy and if not better consider ortho  5. ent issues, follow up Dr Alvarado  6. Elevated cholesterol, low vit d, follow up labs  7. hcm         Plan:   shingrix at pharm  Up to date colon  Exercise, diet  physical therapy  ent eval  Letter with labs      Alejandro Lewis M.D.

## 2019-06-17 NOTE — LETTER
[place] [address] [facility]  [address]  [place]  [address]  Tel: [phone]    June 18, 2019    Sarkishaily Darnell  [address] [facility] [address]        Dear Mr. Darnell,    It was a pleasure seeing you for your physical examination.  I wanted to get back to you with your test results.  I have enclosed a copy for your review.      I am happy to report that your cbc or complete blood count is normal with no signs of anemia, leukemia or platelet abnormalities.  Your chemistry panel shows no signs of diabetes.  Your blood salts, kidney tests, liver tests, thyroid test, and proteins are all fine.  Your vitamin D level remains low.  Please be sure to take vitamin D3.  I would recommend at least 2000 units daily.    Your total cholesterol is 211 with the normal range being below 200.  Your HDL or good cholesterol is 48 with the normal range being above 40.  Your LDL or bad cholesterol is 140 with the normal range being below 130.  While not bad the numbers are higher than I would like to see them.  I would stress regular exercise, healthy diet, and getting your weight down to improve the numbers.    I am happy to bring you this overall excellent report.  If you have any questions please call me.    Alejandro Lewis M.D./L        Enclosure: Lab Results  Results for orders placed or performed in visit on 06/17/19   CBC with platelets   Result Value Ref Range    WBC 8.0 4.0 - 11.0 10e9/L    RBC Count 5.07 4.4 - 5.9 10e12/L    Hemoglobin 15.3 13.3 - 17.7 g/dL    Hematocrit 45.1 40.0 - 53.0 %    MCV 89 78 - 100 fl    MCH 30.2 26.5 - 33.0 pg    MCHC 33.9 31.5 - 36.5 g/dL    RDW 13.8 10.0 - 15.0 %    Platelet Count 292 150 - 450 10e9/L   Comprehensive metabolic panel   Result Value Ref Range    Sodium 140 133 - 144 mmol/L    Potassium 4.1 3.4 - 5.3 mmol/L    Chloride 105 94 - 109 mmol/L    Carbon Dioxide 23 20 - 32 mmol/L    Anion Gap 12 3 - 14 mmol/L    Glucose 90 70 - 99 mg/dL    Urea Nitrogen 11 7 -  30 mg/dL    Creatinine 0.94 0.66 - 1.25 mg/dL    GFR Estimate 88 >60 mL/min/[1.73_m2]    GFR Estimate If Black >90 >60 mL/min/[1.73_m2]    Calcium 8.5 8.5 - 10.1 mg/dL    Bilirubin Total 0.5 0.2 - 1.3 mg/dL    Albumin 3.8 3.4 - 5.0 g/dL    Protein Total 7.3 6.8 - 8.8 g/dL    Alkaline Phosphatase 102 40 - 150 U/L    ALT 37 0 - 70 U/L    AST 26 0 - 45 U/L   Lipid panel reflex to direct LDL Non-fasting   Result Value Ref Range    Cholesterol 211 (H) <200 mg/dL    Triglycerides 117 <150 mg/dL    HDL Cholesterol 48 >39 mg/dL    LDL Cholesterol Calculated 140 (H) <100 mg/dL    Non HDL Cholesterol 163 (H) <130 mg/dL   TSH with free T4 reflex   Result Value Ref Range    TSH 1.72 0.40 - 4.00 mU/L   Vitamin D Deficiency   Result Value Ref Range    Vitamin D Deficiency screening 19 (L) 20 - 75 ug/L

## 2019-06-18 LAB
ALBUMIN SERPL-MCNC: 3.8 G/DL (ref 3.4–5)
ALP SERPL-CCNC: 102 U/L (ref 40–150)
ALT SERPL W P-5'-P-CCNC: 37 U/L (ref 0–70)
ANION GAP SERPL CALCULATED.3IONS-SCNC: 12 MMOL/L (ref 3–14)
AST SERPL W P-5'-P-CCNC: 26 U/L (ref 0–45)
BILIRUB SERPL-MCNC: 0.5 MG/DL (ref 0.2–1.3)
BUN SERPL-MCNC: 11 MG/DL (ref 7–30)
CALCIUM SERPL-MCNC: 8.5 MG/DL (ref 8.5–10.1)
CHLORIDE SERPL-SCNC: 105 MMOL/L (ref 94–109)
CHOLEST SERPL-MCNC: 211 MG/DL
CO2 SERPL-SCNC: 23 MMOL/L (ref 20–32)
CREAT SERPL-MCNC: 0.94 MG/DL (ref 0.66–1.25)
DEPRECATED CALCIDIOL+CALCIFEROL SERPL-MC: 19 UG/L (ref 20–75)
GFR SERPL CREATININE-BSD FRML MDRD: 88 ML/MIN/{1.73_M2}
GLUCOSE SERPL-MCNC: 90 MG/DL (ref 70–99)
HDLC SERPL-MCNC: 48 MG/DL
LDLC SERPL CALC-MCNC: 140 MG/DL
NONHDLC SERPL-MCNC: 163 MG/DL
POTASSIUM SERPL-SCNC: 4.1 MMOL/L (ref 3.4–5.3)
PROT SERPL-MCNC: 7.3 G/DL (ref 6.8–8.8)
SODIUM SERPL-SCNC: 140 MMOL/L (ref 133–144)
TRIGL SERPL-MCNC: 117 MG/DL
TSH SERPL DL<=0.005 MIU/L-ACNC: 1.72 MU/L (ref 0.4–4)

## 2019-06-18 NOTE — RESULT ENCOUNTER NOTE
It was a pleasure seeing you for your physical examination.  I wanted to get back to you with your test results.  I have enclosed a copy for your review.      I am happy to report that your cbc or complete blood count is normal with no signs of anemia, leukemia or platelet abnormalities.  Your chemistry panel shows no signs of diabetes.  Your blood salts, kidney tests, liver tests, thyroid test, and proteins are all fine.  Your vitamin D level remains low.  Please be sure to take vitamin D3.  I would recommend at least 2000 units daily.    Your total cholesterol is 211 with the normal range being below 200.  Your HDL or good cholesterol is 48 with the normal range being above 40.  Your LDL or bad cholesterol is 140 with the normal range being below 130.  While not bad the numbers are higher than I would like to see them.  I would stress regular exercise, healthy diet, and getting your weight down to improve the numbers.    I am happy to bring you this overall excellent report.  If you have any questions please call me.    Alejandro Lewis M.D.

## 2019-09-04 ENCOUNTER — TRANSFERRED RECORDS (OUTPATIENT)
Dept: HEALTH INFORMATION MANAGEMENT | Facility: CLINIC | Age: 59
End: 2019-09-04

## 2019-09-26 ENCOUNTER — TRANSFERRED RECORDS (OUTPATIENT)
Dept: HEALTH INFORMATION MANAGEMENT | Facility: CLINIC | Age: 59
End: 2019-09-26

## 2019-10-10 ENCOUNTER — TRANSFERRED RECORDS (OUTPATIENT)
Dept: HEALTH INFORMATION MANAGEMENT | Facility: CLINIC | Age: 59
End: 2019-10-10

## 2019-10-29 ENCOUNTER — TRANSFERRED RECORDS (OUTPATIENT)
Dept: HEALTH INFORMATION MANAGEMENT | Facility: CLINIC | Age: 59
End: 2019-10-29

## 2019-11-08 ENCOUNTER — HEALTH MAINTENANCE LETTER (OUTPATIENT)
Age: 59
End: 2019-11-08

## 2019-11-16 ENCOUNTER — TRANSFERRED RECORDS (OUTPATIENT)
Dept: HEALTH INFORMATION MANAGEMENT | Facility: CLINIC | Age: 59
End: 2019-11-16

## 2020-02-28 DIAGNOSIS — R97.20 ELEVATED PROSTATE SPECIFIC ANTIGEN (PSA): Primary | ICD-10-CM

## 2020-03-04 ENCOUNTER — TELEPHONE (OUTPATIENT)
Dept: UROLOGY | Facility: CLINIC | Age: 60
End: 2020-03-04

## 2020-03-04 ENCOUNTER — OFFICE VISIT (OUTPATIENT)
Dept: UROLOGY | Facility: CLINIC | Age: 60
End: 2020-03-04
Payer: COMMERCIAL

## 2020-03-04 VITALS
DIASTOLIC BLOOD PRESSURE: 84 MMHG | WEIGHT: 188 LBS | BODY MASS INDEX: 30.22 KG/M2 | SYSTOLIC BLOOD PRESSURE: 120 MMHG | HEART RATE: 72 BPM | OXYGEN SATURATION: 98 % | HEIGHT: 66 IN

## 2020-03-04 DIAGNOSIS — R97.20 ELEVATED PROSTATE SPECIFIC ANTIGEN (PSA): ICD-10-CM

## 2020-03-04 DIAGNOSIS — N52.9 ED (ERECTILE DYSFUNCTION): Primary | ICD-10-CM

## 2020-03-04 LAB
ALBUMIN UR-MCNC: NEGATIVE MG/DL
APPEARANCE UR: CLEAR
BILIRUB UR QL STRIP: NEGATIVE
COLOR UR AUTO: YELLOW
GLUCOSE UR STRIP-MCNC: NEGATIVE MG/DL
HGB UR QL STRIP: NEGATIVE
KETONES UR STRIP-MCNC: NEGATIVE MG/DL
LEUKOCYTE ESTERASE UR QL STRIP: NEGATIVE
NITRATE UR QL: NEGATIVE
PH UR STRIP: 7 PH (ref 5–7)
PSA SERPL-MCNC: 11.5 NG/ML (ref 0–4)
SOURCE: NORMAL
SP GR UR STRIP: 1.01 (ref 1–1.03)
UROBILINOGEN UR STRIP-ACNC: 0.2 EU/DL (ref 0.2–1)

## 2020-03-04 PROCEDURE — 36415 COLL VENOUS BLD VENIPUNCTURE: CPT | Performed by: UROLOGY

## 2020-03-04 PROCEDURE — 84153 ASSAY OF PSA TOTAL: CPT | Performed by: UROLOGY

## 2020-03-04 PROCEDURE — 81003 URINALYSIS AUTO W/O SCOPE: CPT | Performed by: UROLOGY

## 2020-03-04 PROCEDURE — 99213 OFFICE O/P EST LOW 20 MIN: CPT | Performed by: UROLOGY

## 2020-03-04 ASSESSMENT — MIFFLIN-ST. JEOR: SCORE: 1610.51

## 2020-03-04 ASSESSMENT — PAIN SCALES - GENERAL: PAINLEVEL: NO PAIN (0)

## 2020-03-04 NOTE — LETTER
3/4/2020       RE: Sarkis Darnell  [address]  [place] [address]     Dear Colleague,    Thank you for referring your patient, Sarkis Darnell, to the [facility] at [facility]. Please see a copy of my visit note below.    Mr. Darnell is a 59-year-old male that had an elevated PSA 18 months ago and was evaluated with a 3 Dayna MRI that was not suspicious.  His prostate volume was 43 cc and his PSA 7.6.  Unfortunately, he did not follow-up with me at 6 months for a PSA.  His PSA is now 11.5 but he did have a nocturnal ejaculation 2 days ago.  He is having no trouble voiding, dysuria or hematuria.  He is using Trimix No. 9 for erections and is up to a dose of 0.30 cc with out improvement in his natural erection.  We discussed possible venous leak briefly.  He also is concerned about a sebaceous cyst on the scrotum.  He also has dryness of the scrotal skin and several other questions.  Other past medical history: Vitamin D deficiency, adhesive capsulitis of shoulder, hypothyroidism, GERD, high cholesterol.  No family history of prostate cancer  Medications: Synthroid, Trimix No. 9  Allergies: Dust mites  Review of systems: No dysuria or hematuria.  Exam: Alert and oriented, normal appearance, normal vital signs.  Normal respirations, neuro grossly intact.  Circumcised phallus.  1 sebaceous cyst on the left scrotal wall that is not inflamed.  Dry scrotal skin.  Normal sphincter tone, no rectal mass or impaction, left posterior lobe of prostate is benign, right lobe is slightly larger and firmer but no nodules.  Normal seminal vesicles  Assessment: Elevated PSA, erectile dysfunction- we will repeat PSA in 2 weeks with no ejaculations 1 week prior.  If it is still elevated he will need a repeat MRI of the prostate and likely biopsies.  He will use lotion with some lanolin in it for his dry scrotal skin.  He does not need anything done  about his sebaceous cyst.  He will increase his Trimix dose to 0.40 before I suggest he see Benji Collins MD at the [place] for other options-all discussed    Again, thank you for allowing me to participate in the care of your patient.      Sincerely,    Micah Gonzalez MD

## 2020-03-04 NOTE — NURSING NOTE
Chief Complaint   Patient presents with     Clinic Care Coordination - Follow-up     Pt here for same day PSA     Lena Malave, NIKUNJ

## 2020-03-04 NOTE — TELEPHONE ENCOUNTER
Rx sent to [facility].  BRANDEE Chirinos RN      ----- Message from Micah Gonzalez MD sent at 3/4/2020 11:41 AM CST -----  Can you renew this gentleman's Trimix #9? Thank you.

## 2020-03-04 NOTE — PROGRESS NOTES
Mr. Darnell is a 59-year-old male that had an elevated PSA 18 months ago and was evaluated with a 3 Dayna MRI that was not suspicious.  His prostate volume was 43 cc and his PSA 7.6.  Unfortunately, he did not follow-up with me at 6 months for a PSA.  His PSA is now 11.5 but he did have a nocturnal ejaculation 2 days ago.  He is having no trouble voiding, dysuria or hematuria.  He is using Trimix No. 9 for erections and is up to a dose of 0.30 cc with out improvement in his natural erection.  We discussed possible venous leak briefly.  He also is concerned about a sebaceous cyst on the scrotum.  He also has dryness of the scrotal skin and several other questions.  Other past medical history: Vitamin D deficiency, adhesive capsulitis of shoulder, hypothyroidism, GERD, high cholesterol.  No family history of prostate cancer  Medications: Synthroid, Trimix No. 9  Allergies: Dust mites  Review of systems: No dysuria or hematuria.  Exam: Alert and oriented, normal appearance, normal vital signs.  Normal respirations, neuro grossly intact.  Circumcised phallus.  1 sebaceous cyst on the left scrotal wall that is not inflamed.  Dry scrotal skin.  Normal sphincter tone, no rectal mass or impaction, left posterior lobe of prostate is benign, right lobe is slightly larger and firmer but no nodules.  Normal seminal vesicles  Assessment: Elevated PSA, erectile dysfunction- we will repeat PSA in 2 weeks with no ejaculations 1 week prior.  If it is still elevated he will need a repeat MRI of the prostate and likely biopsies.  He will use lotion with some lanolin in it for his dry scrotal skin.  He does not need anything done about his sebaceous cyst.  He will increase his Trimix dose to 0.40 before I suggest he see Benji Collins MD at the [place] for other options-all discussed

## 2020-03-08 ENCOUNTER — MYC MEDICAL ADVICE (OUTPATIENT)
Dept: FAMILY MEDICINE | Facility: CLINIC | Age: 60
End: 2020-03-08

## 2020-04-16 DIAGNOSIS — R97.20 ELEVATED PROSTATE SPECIFIC ANTIGEN (PSA): ICD-10-CM

## 2020-04-16 PROCEDURE — 36415 COLL VENOUS BLD VENIPUNCTURE: CPT | Performed by: UROLOGY

## 2020-04-16 PROCEDURE — 84153 ASSAY OF PSA TOTAL: CPT | Performed by: UROLOGY

## 2020-04-17 LAB — PSA SERPL-MCNC: 8.98 UG/L (ref 0–4)

## 2020-05-05 DIAGNOSIS — R97.20 ELEVATED PROSTATE SPECIFIC ANTIGEN (PSA): Primary | ICD-10-CM

## 2020-05-17 ENCOUNTER — ANCILLARY PROCEDURE (OUTPATIENT)
Dept: MRI IMAGING | Facility: CLINIC | Age: 60
End: 2020-05-17
Attending: UROLOGY

## 2020-05-17 DIAGNOSIS — R97.20 ELEVATED PROSTATE SPECIFIC ANTIGEN (PSA): ICD-10-CM

## 2020-05-17 RX ORDER — GADOBUTROL 604.72 MG/ML
7.5 INJECTION INTRAVENOUS ONCE
Status: COMPLETED | OUTPATIENT
Start: 2020-05-17 | End: 2020-05-17

## 2020-05-17 RX ADMIN — GADOBUTROL 7.5 ML: 604.72 INJECTION INTRAVENOUS at 08:15

## 2020-05-17 NOTE — DISCHARGE INSTRUCTIONS
MRI Contrast Discharge Instructions    The IV contrast you received today will pass out of your body in your  urine. This will happen in the next 24 hours. You will not feel this process.  Your urine will not change color.    Drink at least 4 extra glasses of water or juice today (unless your doctor  has restricted your fluids). This reduces the stress on your kidneys.  You may take your regular medicines.    If you are on dialysis: It is best to have dialysis today.    If you have a reaction: Most reactions happen right away. If you have  any new symptoms after leaving the hospital (such as hives or swelling),  call your hospital at the correct number below. Or call your family doctor.  If you have breathing distress or wheezing, call 911.    Special instructions: ***    I have read and understand the above information.    Signature:______________________________________ Date:___________    Staff:__________________________________________ Date:___________     Time:__________    [facility] Departments:    ___[place]: [phone]  ___[facility]: [phone]  ___[place]: [phone] ___[facility]: [phone]  ___[facility]: [phone]  ___[facility]: [phone]  ___[phone]  ___[facility]: [phone]  ___[phone]

## 2020-05-19 DIAGNOSIS — R97.20 ELEVATED PSA: Primary | ICD-10-CM

## 2020-08-31 ENCOUNTER — MYC MEDICAL ADVICE (OUTPATIENT)
Dept: FAMILY MEDICINE | Facility: CLINIC | Age: 60
End: 2020-08-31

## 2020-08-31 DIAGNOSIS — E03.9 HYPOTHYROIDISM, UNSPECIFIED TYPE: ICD-10-CM

## 2020-09-28 RX ORDER — LEVOTHYROXINE SODIUM 88 UG/1
88 TABLET ORAL DAILY
Qty: 90 TABLET | Refills: 0 | Status: SHIPPED | OUTPATIENT
Start: 2020-09-28 | End: 2020-11-17

## 2020-09-28 NOTE — TELEPHONE ENCOUNTER
See below     Approved #30 Levothyroxine per protocol     Pt due for appt - agreed to schedule (has not yet done so) but is requesting 90 day instead - pended    .Kathryn CUTLER RN

## 2020-12-06 ENCOUNTER — HEALTH MAINTENANCE LETTER (OUTPATIENT)
Age: 60
End: 2020-12-06

## 2020-12-17 ENCOUNTER — OFFICE VISIT (OUTPATIENT)
Dept: SURGERY | Facility: CLINIC | Age: 60
End: 2020-12-17
Payer: COMMERCIAL

## 2020-12-17 VITALS
HEART RATE: 68 BPM | SYSTOLIC BLOOD PRESSURE: 106 MMHG | WEIGHT: 189 LBS | DIASTOLIC BLOOD PRESSURE: 70 MMHG | BODY MASS INDEX: 30.51 KG/M2

## 2020-12-17 DIAGNOSIS — K43.2 INCISIONAL HERNIA, WITHOUT OBSTRUCTION OR GANGRENE: ICD-10-CM

## 2020-12-17 PROCEDURE — 99204 OFFICE O/P NEW MOD 45 MIN: CPT | Performed by: SURGERY

## 2020-12-17 NOTE — LETTER
December 18, 2020          Referred MD Fede  No address on file      RE:   Sarkis Darnell 1960      Dear Colleague,    Thank you for referring your patient, Sarkis Darnell, to [facility]. Please see a copy of my visit note below.    Surgery Consultation, [facility]         Sivakumar Burr MD, MD     Sarkis Darnell MRN# [id number]   YOB: 1960 Age: 60 year old      PCP:  Alejandro Lewis [phone]     Chief Complaint: Incisional hernia     History of Present Illness:  Sarkis Darnell is a 60 year old male who presented with a bulge near the umbilicus. The bulge comes and goes but has gotten larger over time. It is uncomfortable when the patient is engaging in exertional activity.  Patient is known to me from a laparoscopic cholecystectomy performed in 2015.  This bulge occurred within 2 to 3 years after the surgery and has gotten progressively larger.  Was relatively asymptomatic but in November had an episode of increased tenderness which may have represented incarceration.  The patient is here to discuss possible surgical repair of the incisional hernia.      Assessment/plan:  Pleasant healthy gentleman with what appears to be an incisional hernia following laparoscopic cholecystectomy. I explained that these are usually not a cause for small bowel incarceration or obstruction, but do become larger over time. They can certainly be uncomfortable and repair is warranted. I recommended an laparoscopic umbilical hernia repair with mesh. This would normally be done with general anesthesia. Risks of surgery were explained to the patient, including hernia recurrence, wound infection, or mesh removal.  Patient was going to notify the office when they were ready to schedule surgery.      Again, thank you for allowing me to participate in the care of your patient.      Sincerely,      Sivakumar Burr MD

## 2020-12-17 NOTE — PROGRESS NOTES
Surgery Consultation, [facility], AGUSTINA Burr MD, MD    Sarkis Darnell MRN# [id number]   YOB: 1960 Age: 60 year old     PCP:  Alejandro Lewis [phone]    Chief Complaint: Incisional hernia    History of Present Illness:  Sarkis Darnell is a 60 year old male who presented with a bulge near the umbilicus. The bulge comes and goes but has gotten larger over time. It is uncomfortable when the patient is engaging in exertional activity.  Patient is known to me from a laparoscopic cholecystectomy performed in 2015.  This bulge occurred within 2 to 3 years after the surgery and has gotten progressively larger.  Was relatively asymptomatic but in November had an episode of increased tenderness which may have represented incarceration.  The patient is here to discuss possible surgical repair of the incisional hernia.    PMH:  Sarkis Darnell  has a past medical history of BPH (benign prostatic hyperplasia) (2016), Chest pain (1/14), Elevated PSA (2016), Erectile dysfunction, Gastritis, Hypercholesteremia, Hypothyroid (2007), Normal colonoscopy (2010), and Vitamin D deficiency (2009).  PSH:  Sarkis Darnell  has a past surgical history that includes Laparoscopic cholecystectomy (N/A, 11/16/2015) and Prostate surgery.    Home medications and allergies reviewed.    Social History:  Sarkis Darnell  reports that he has never smoked. He has never used smokeless tobacco. He reports that he does not drink alcohol or use drugs.  Family History:  Sarkis Darnell family history includes Arthritis in his mother; C.A.D. in his mother; Cancer in his maternal grandfather, mother, paternal grandfather, and paternal grandmother; Coronary Artery Disease in his father; Hyperlipidemia in his brother; Hypertension in his brother; No Known Problems in his sister.    ROS:  The 10 point Review of Systems is negative other than noted in the HPI.    Physical Exam:  Blood pressure 106/70, pulse 68, weight  85.7 kg (189 lb).  189 lbs 0 oz  Mildly overweight pleasant gentleman in no distress.  Answers questions appropriately  Tongue midline, oral mucosa moist  Pupils equal round and reactive to light.   No cervical lymphadenopathy or thyromegaly.   Lung fields clear, breathing comfortably.   Heart normal sinus rhythm.  No murmurs rubs or gallops.  Abdomen soft, nontender, nondistended.  Easily palpable incisional periumbilical hernia.  This extends to the left of midline in the herniated tissue measures approximately 3 cm in diameter.  Not easily reducible.       Assessment/plan:  Pleasant healthy gentleman with what appears to be an incisional hernia following laparoscopic cholecystectomy. I explained that these are usually not a cause for small bowel incarceration or obstruction, but do become larger over time. They can certainly be uncomfortable and repair is warranted. I recommended an laparoscopic umbilical hernia repair with mesh. This would normally be done with general anesthesia. Risks of surgery were explained to the patient, including hernia recurrence, wound infection, or mesh removal.  Patient was going to notify the office when they were ready to schedule surgery.    Surgical comorbidities include hyperlipidemia, hypothyroidism, GERD, hypertension.    Deshawn Burr M.D.  [facility]  [phone]    Please route or send letter to:  Primary Care Provider (PCP) and Referring Provider

## 2020-12-28 ENCOUNTER — TELEPHONE (OUTPATIENT)
Dept: SURGERY | Facility: CLINIC | Age: 60
End: 2020-12-28

## 2020-12-28 NOTE — TELEPHONE ENCOUNTER
Orders received for Laparoscopic ventral hernia repair with Dr. Sivakumar Burr.      Left message for patient to call me at his convenience to schedule surgery.     Osiris CHATMAN    Surgery Coordinator  [facility]  [facility]  [phone]

## 2020-12-31 DIAGNOSIS — Z11.59 ENCOUNTER FOR SCREENING FOR OTHER VIRAL DISEASES: Primary | ICD-10-CM

## 2021-01-04 ENCOUNTER — TELEPHONE (OUTPATIENT)
Dept: SURGERY | Facility: CLINIC | Age: 61
End: 2021-01-04

## 2021-01-04 NOTE — TELEPHONE ENCOUNTER
Type of surgery: Laparoscopic ventral hernia repair  Location of surgery: [facility]  Date and time of surgery: 1/8/21 at 10:40am  Surgeon: Dr. Sivakumar Burr  Pre-Op Appt Date: Patient to schedule  Post-Op Appt Date: Patient to schedule   Packet sent out: Yes  Pre-cert/Authorization completed:  Not Applicable  Date: 1/4/21

## 2021-01-06 DIAGNOSIS — Z11.59 ENCOUNTER FOR SCREENING FOR OTHER VIRAL DISEASES: ICD-10-CM

## 2021-01-06 LAB
SARS-COV-2 RNA RESP QL NAA+PROBE: NORMAL
SPECIMEN SOURCE: NORMAL

## 2021-01-06 PROCEDURE — 87635 SARS-COV-2 COVID-19 AMP PRB: CPT | Performed by: SURGERY

## 2021-01-07 ENCOUNTER — OFFICE VISIT (OUTPATIENT)
Dept: FAMILY MEDICINE | Facility: CLINIC | Age: 61
End: 2021-01-07
Payer: COMMERCIAL

## 2021-01-07 VITALS
HEART RATE: 73 BPM | SYSTOLIC BLOOD PRESSURE: 108 MMHG | DIASTOLIC BLOOD PRESSURE: 72 MMHG | TEMPERATURE: 96.7 F | WEIGHT: 197 LBS | HEIGHT: 66 IN | BODY MASS INDEX: 31.66 KG/M2 | OXYGEN SATURATION: 100 %

## 2021-01-07 DIAGNOSIS — E03.9 HYPOTHYROIDISM, UNSPECIFIED TYPE: ICD-10-CM

## 2021-01-07 DIAGNOSIS — R97.20 ELEVATED PROSTATE SPECIFIC ANTIGEN (PSA): ICD-10-CM

## 2021-01-07 DIAGNOSIS — Z01.818 PREOP GENERAL PHYSICAL EXAM: ICD-10-CM

## 2021-01-07 DIAGNOSIS — K43.2 INCISIONAL HERNIA, WITHOUT OBSTRUCTION OR GANGRENE: Primary | ICD-10-CM

## 2021-01-07 LAB
HGB BLD-MCNC: 15.8 G/DL (ref 13.3–17.7)
LABORATORY COMMENT REPORT: NORMAL
SARS-COV-2 RNA RESP QL NAA+PROBE: NEGATIVE
SPECIMEN SOURCE: NORMAL
TSH SERPL DL<=0.005 MIU/L-ACNC: 2.79 MU/L (ref 0.4–4)

## 2021-01-07 PROCEDURE — 99214 OFFICE O/P EST MOD 30 MIN: CPT | Performed by: PHYSICIAN ASSISTANT

## 2021-01-07 PROCEDURE — 36415 COLL VENOUS BLD VENIPUNCTURE: CPT | Performed by: PHYSICIAN ASSISTANT

## 2021-01-07 PROCEDURE — 93000 ELECTROCARDIOGRAM COMPLETE: CPT | Performed by: PHYSICIAN ASSISTANT

## 2021-01-07 PROCEDURE — 84443 ASSAY THYROID STIM HORMONE: CPT | Performed by: PHYSICIAN ASSISTANT

## 2021-01-07 PROCEDURE — G0103 PSA SCREENING: HCPCS | Performed by: PHYSICIAN ASSISTANT

## 2021-01-07 PROCEDURE — 85018 HEMOGLOBIN: CPT | Performed by: PHYSICIAN ASSISTANT

## 2021-01-07 ASSESSMENT — MIFFLIN-ST. JEOR: SCORE: 1646.34

## 2021-01-07 NOTE — RESULT ENCOUNTER NOTE
It was a pleasure seeing you.  I wanted to get back to you with your test results.     Your hemoglobin and thyroid tests were in normal range.  Your EKG was normal.    Your PSA is pending but the results will go straight to Dr. Gonzalez.    Chelo Gastelum PA-C

## 2021-01-07 NOTE — PROGRESS NOTES
[facility]  [address] [facility] [address]  Phone: [phone]  Primary Provider: Alejandro Lewis  Pre-op Performing Provider: NICKI ARRINGTON PA-C    PREOPERATIVE EVALUATION:  Today's date: 1/7/2021    Sarkis Darnell is a 60 year old male who presents for a preoperative evaluation.    Surgical Information:  Surgery/Procedure: LAPAROSCOPIC VENTRAL HERNIA REPAIR WITH MESH  Surgery Location: [facility]   Surgeon: Sivakumar Burr MD  Surgery Date: 01/08/2021  Time of Surgery: 10:40 AM  Where patient plans to recover: At home with family  Fax number for surgical facility: Note does not need to be faxed, will be available electronically in Epic.    Type of Anesthesia Anticipated: General    Subjective     HPI related to upcoming procedure: hernia with tearing sensation in the abd. Hx of lap renetta.    Preop Questions 1/7/2021   1. Have you ever had a heart attack or stroke? No   2. Have you ever had surgery on your heart or blood vessels, such as a stent placement, a coronary artery bypass, or surgery on an artery in your head, neck, heart, or legs? No   3. Do you have chest pain with activity? No   4. Do you have a history of  heart failure? No   5. Do you currently have a cold, bronchitis or symptoms of other infection? No   6. Do you have a cough, shortness of breath, or wheezing? No   7. Do you or anyone in your family have previous history of blood clots? No   8. Do you or does anyone in your family have a serious bleeding problem such as prolonged bleeding following surgeries or cuts? No   9. Have you ever had problems with anemia or been told to take iron pills? No   10. Have you had any abnormal blood loss such as black, tarry or bloody stools? No   11. Have you ever had a blood transfusion? No   12. Are you willing to have a blood transfusion if it is medically needed before, during, or after your surgery? Yes   13. Have you or any of your relatives ever  had problems with anesthesia? No   14. Do you have sleep apnea, excessive snoring or daytime drowsiness? UNKNOWN -    15. Do you have any artifical heart valves or other implanted medical devices like a pacemaker, defibrillator, or continuous glucose monitor? No   16. Do you have artificial joints? No   17. Are you allergic to latex? No     0956}      Review of Systems  Constitutional, neuro, ENT, endocrine, pulmonary, cardiac, gastrointestinal, genitourinary, musculoskeletal, integument and psychiatric systems are negative, except as otherwise noted.    Patient Active Problem List    Diagnosis Date Noted     Incisional hernia, without obstruction or gangrene 12/17/2020     Priority: Medium     Hypercholesteremia      Priority: Medium     Benign non-nodular prostatic hyperplasia without lower urinary tract symptoms 07/05/2016     Priority: Medium     Hypothyroidism, unspecified type 06/21/2016     Priority: Medium     Gastroesophageal reflux disease without esophagitis 06/21/2016     Priority: Medium     Erectile dysfunction, unspecified erectile dysfunction type 06/21/2016     Priority: Medium     Elevated PSA      Priority: Medium     tyrone Singh neg       Adhesive capsulitis of shoulder 02/04/2015     Priority: Medium     Vitamin D deficiency      Priority: Medium     Vitamin D2/D3 of 19        Past Medical History:   Diagnosis Date     BPH (benign prostatic hyperplasia) 2016    Dr. Gonzalez     Chest pain 1/14    neg est echo     Elevated PSA 2016    tyrone Singh neg     Erectile dysfunction     Dr. Gonzalez     Gastritis      Hypercholesteremia      Hypothyroid 2007     Normal colonoscopy 2010     Vitamin D deficiency 2009    Vitamin D2/D3 of 19     Past Surgical History:   Procedure Laterality Date     LAPAROSCOPIC CHOLECYSTECTOMY N/A 11/16/2015    Procedure: LAPAROSCOPIC CHOLECYSTECTOMY;  Surgeon: Sivakumar Burr MD;  Location: [facility]     PROSTATE SURGERY      Trus BX     Current Outpatient Medications  "  Medication Sig Dispense Refill     levothyroxine (SYNTHROID/LEVOTHROID) 88 MCG tablet TAKE 1 TABLET BY MOUTH  DAILY 30 tablet 0       Allergies   Allergen Reactions     Dust Mites      Sneezing, water eyes        Social History     Tobacco Use     Smoking status: Never Smoker     Smokeless tobacco: Never Used   Substance Use Topics     Alcohol use: No     Alcohol/week: 0.0 standard drinks     Frequency: Never     Binge frequency: Never     Family History   Problem Relation Age of Onset     C.A.D. Mother      Arthritis Mother      Cancer Mother         ovarian     Coronary Artery Disease Father      Hyperlipidemia Brother      Hypertension Brother      No Known Problems Sister      Cancer Maternal Grandfather         esophageal cancer     Cancer Paternal Grandmother         liver cancer     Cancer Paternal Grandfather         throat cancer     History   Drug Use No         Objective     /72 (BP Location: Left arm, Patient Position: Chair, Cuff Size: Adult Regular)   Pulse 73   Temp 96.7  F (35.9  C) (Temporal)   Ht 1.676 m (5' 6\")   Wt 89.4 kg (197 lb)   SpO2 100%   BMI 31.80 kg/m      Physical Exam    GENERAL APPEARANCE: healthy, alert and no distress     EYES: EOMI,  PERRL     HENT: ear canals and TM's normal and nose and mouth without ulcers or lesions     NECK: no adenopathy, no asymmetry, masses, or scars and thyroid normal to palpation     RESP: lungs clear to auscultation - no rales, rhonchi or wheezes     CV: regular rates and rhythm, normal S1 S2, no S3 or S4 and no murmur, click or rub     ABDOMEN:  +umb hernia. Otherwise soft, nontender, no HSM or masses and bowel sounds normal     MS: extremities normal- no gross deformities noted, no evidence of inflammation in joints, FROM in all extremities.     SKIN: no suspicious lesions or rashes     NEURO: Normal strength and tone, sensory exam grossly normal, mentation intact and speech normal     PSYCH: mentation appears normal. and affect " normal/bright     LYMPHATICS: No cervical adenopathy        Diagnostics:  Results for orders placed or performed in visit on 01/07/21   Hemoglobin     Status: None   Result Value Ref Range    Hemoglobin 15.8 13.3 - 17.7 g/dL        EKG: appears normal, NSR, normal axis, normal intervals, no acute ST/T changes c/w ischemia, no LVH by voltage criteria, unchanged from previous tracings  0956}      Assessment and Plan:     (K43.2) Incisional hernia, without obstruction or gangrene  (primary encounter diagnosis)  Comment: pt may take his synthroid with a sip of water the morning of surgery  Plan: cleared to proceed with surgery. Had covid test yesterday and results are pending.    (Z01.818) Preop general physical exam  Comment:   Plan: EKG 12-lead complete w/read - Clinics,         Hemoglobin            (E03.9) Hypothyroidism, unspecified type  Comment:   Plan: TSH with free T4 reflex              Chelo Gastelum PA-C      The proposed surgical procedure is considered LOW risk.    Preop [facility] Preop Guidelines    Revised Cardiac Risk Index

## 2021-01-08 ENCOUNTER — HOSPITAL ENCOUNTER (OUTPATIENT)
Facility: CLINIC | Age: 61
Discharge: HOME | End: 2021-01-08
Attending: SURGERY | Admitting: SURGERY
Payer: COMMERCIAL

## 2021-01-08 ENCOUNTER — ANESTHESIA EVENT (OUTPATIENT)
Dept: SURGERY | Facility: CLINIC | Age: 61
End: 2021-01-08
Payer: COMMERCIAL

## 2021-01-08 ENCOUNTER — APPOINTMENT (OUTPATIENT)
Dept: SURGERY | Facility: PHYSICIAN GROUP | Age: 61
End: 2021-01-08
Payer: COMMERCIAL

## 2021-01-08 ENCOUNTER — ANESTHESIA (OUTPATIENT)
Dept: SURGERY | Facility: CLINIC | Age: 61
End: 2021-01-08
Payer: COMMERCIAL

## 2021-01-08 VITALS
DIASTOLIC BLOOD PRESSURE: 82 MMHG | OXYGEN SATURATION: 94 % | BODY MASS INDEX: 31.87 KG/M2 | RESPIRATION RATE: 11 BRPM | HEIGHT: 66 IN | TEMPERATURE: 97.2 F | SYSTOLIC BLOOD PRESSURE: 120 MMHG | HEART RATE: 85 BPM | WEIGHT: 198.3 LBS

## 2021-01-08 DIAGNOSIS — K43.2 INCISIONAL HERNIA, WITHOUT OBSTRUCTION OR GANGRENE: ICD-10-CM

## 2021-01-08 DIAGNOSIS — G89.18 POST-OP PAIN: Primary | ICD-10-CM

## 2021-01-08 LAB — PSA SERPL-ACNC: 9.71 UG/L (ref 0–4)

## 2021-01-08 PROCEDURE — 250N000011 HC RX IP 250 OP 636: Performed by: ANESTHESIOLOGY

## 2021-01-08 PROCEDURE — 360N000076 HC SURGERY LEVEL 3, PER MIN: Performed by: SURGERY

## 2021-01-08 PROCEDURE — C1781 MESH (IMPLANTABLE): HCPCS | Performed by: SURGERY

## 2021-01-08 PROCEDURE — 250N000013 HC RX MED GY IP 250 OP 250 PS 637: Performed by: PHYSICIAN ASSISTANT

## 2021-01-08 PROCEDURE — 258N000003 HC RX IP 258 OP 636: Performed by: ANESTHESIOLOGY

## 2021-01-08 PROCEDURE — 250N000009 HC RX 250: Performed by: NURSE ANESTHETIST, CERTIFIED REGISTERED

## 2021-01-08 PROCEDURE — 250N000011 HC RX IP 250 OP 636: Performed by: PHYSICIAN ASSISTANT

## 2021-01-08 PROCEDURE — 49650 LAP ING HERNIA REPAIR INIT: CPT | Mod: AS | Performed by: PHYSICIAN ASSISTANT

## 2021-01-08 PROCEDURE — 250N000011 HC RX IP 250 OP 636: Performed by: NURSE ANESTHETIST, CERTIFIED REGISTERED

## 2021-01-08 PROCEDURE — 258N000003 HC RX IP 258 OP 636: Performed by: NURSE ANESTHETIST, CERTIFIED REGISTERED

## 2021-01-08 PROCEDURE — 49650 LAP ING HERNIA REPAIR INIT: CPT | Performed by: SURGERY

## 2021-01-08 PROCEDURE — 710N000009 HC RECOVERY PHASE 1, LEVEL 1, PER MIN: Performed by: SURGERY

## 2021-01-08 PROCEDURE — 710N000012 HC RECOVERY PHASE 2, PER MINUTE: Performed by: SURGERY

## 2021-01-08 PROCEDURE — 999N000141 HC STATISTIC PRE-PROCEDURE NURSING ASSESSMENT: Performed by: SURGERY

## 2021-01-08 PROCEDURE — 250N000025 HC SEVOFLURANE, PER MIN: Performed by: SURGERY

## 2021-01-08 PROCEDURE — 370N000017 HC ANESTHESIA TECHNICAL FEE, PER MIN: Performed by: SURGERY

## 2021-01-08 PROCEDURE — 250N000011 HC RX IP 250 OP 636: Performed by: SURGERY

## 2021-01-08 PROCEDURE — 272N000001 HC OR GENERAL SUPPLY STERILE: Performed by: SURGERY

## 2021-01-08 PROCEDURE — 250N000009 HC RX 250: Performed by: SURGERY

## 2021-01-08 DEVICE — MESH SYMBOTEX COMPOSITE STEX 15CM X 10CM SYM1510: Type: IMPLANTABLE DEVICE | Site: ABDOMEN | Status: FUNCTIONAL

## 2021-01-08 RX ORDER — FENTANYL CITRATE 50 UG/ML
INJECTION, SOLUTION INTRAMUSCULAR; INTRAVENOUS PRN
Status: DISCONTINUED | OUTPATIENT
Start: 2021-01-08 | End: 2021-01-08

## 2021-01-08 RX ORDER — LIDOCAINE HYDROCHLORIDE 20 MG/ML
INJECTION, SOLUTION INFILTRATION; PERINEURAL PRN
Status: DISCONTINUED | OUTPATIENT
Start: 2021-01-08 | End: 2021-01-08

## 2021-01-08 RX ORDER — ONDANSETRON 4 MG/1
4 TABLET, ORALLY DISINTEGRATING ORAL EVERY 30 MIN PRN
Status: DISCONTINUED | OUTPATIENT
Start: 2021-01-08 | End: 2021-01-08 | Stop reason: HOSPADM

## 2021-01-08 RX ORDER — GLYCOPYRROLATE 0.2 MG/ML
INJECTION, SOLUTION INTRAMUSCULAR; INTRAVENOUS PRN
Status: DISCONTINUED | OUTPATIENT
Start: 2021-01-08 | End: 2021-01-08

## 2021-01-08 RX ORDER — MAGNESIUM HYDROXIDE 1200 MG/15ML
LIQUID ORAL PRN
Status: DISCONTINUED | OUTPATIENT
Start: 2021-01-08 | End: 2021-01-08 | Stop reason: HOSPADM

## 2021-01-08 RX ORDER — FENTANYL CITRATE 50 UG/ML
25-50 INJECTION, SOLUTION INTRAMUSCULAR; INTRAVENOUS
Status: DISCONTINUED | OUTPATIENT
Start: 2021-01-08 | End: 2021-01-08 | Stop reason: HOSPADM

## 2021-01-08 RX ORDER — KETOROLAC TROMETHAMINE 15 MG/ML
15 INJECTION, SOLUTION INTRAMUSCULAR; INTRAVENOUS ONCE
Status: COMPLETED | OUTPATIENT
Start: 2021-01-08 | End: 2021-01-08

## 2021-01-08 RX ORDER — SODIUM CHLORIDE, SODIUM LACTATE, POTASSIUM CHLORIDE, CALCIUM CHLORIDE 600; 310; 30; 20 MG/100ML; MG/100ML; MG/100ML; MG/100ML
INJECTION, SOLUTION INTRAVENOUS CONTINUOUS
Status: DISCONTINUED | OUTPATIENT
Start: 2021-01-08 | End: 2021-01-08 | Stop reason: HOSPADM

## 2021-01-08 RX ORDER — CYCLOBENZAPRINE HCL 10 MG
10 TABLET ORAL 3 TIMES DAILY
Qty: 30 TABLET | Refills: 0 | Status: SHIPPED | OUTPATIENT
Start: 2021-01-08 | End: 2021-01-26

## 2021-01-08 RX ORDER — AMOXICILLIN 250 MG
1 CAPSULE ORAL 2 TIMES DAILY
Qty: 20 TABLET | Refills: 0 | Status: SHIPPED | OUTPATIENT
Start: 2021-01-08 | End: 2021-01-26

## 2021-01-08 RX ORDER — ONDANSETRON 2 MG/ML
4 INJECTION INTRAMUSCULAR; INTRAVENOUS EVERY 30 MIN PRN
Status: DISCONTINUED | OUTPATIENT
Start: 2021-01-08 | End: 2021-01-08 | Stop reason: HOSPADM

## 2021-01-08 RX ORDER — BUPIVACAINE HYDROCHLORIDE AND EPINEPHRINE 5; 5 MG/ML; UG/ML
INJECTION, SOLUTION PERINEURAL PRN
Status: DISCONTINUED | OUTPATIENT
Start: 2021-01-08 | End: 2021-01-08 | Stop reason: HOSPADM

## 2021-01-08 RX ORDER — HYDROCODONE BITARTRATE AND ACETAMINOPHEN 5; 325 MG/1; MG/1
1-2 TABLET ORAL EVERY 4 HOURS PRN
Qty: 20 TABLET | Refills: 0 | Status: SHIPPED | OUTPATIENT
Start: 2021-01-08 | End: 2021-05-03

## 2021-01-08 RX ORDER — NALOXONE HYDROCHLORIDE 0.4 MG/ML
0.4 INJECTION, SOLUTION INTRAMUSCULAR; INTRAVENOUS; SUBCUTANEOUS
Status: DISCONTINUED | OUTPATIENT
Start: 2021-01-08 | End: 2021-01-08 | Stop reason: HOSPADM

## 2021-01-08 RX ORDER — NALOXONE HYDROCHLORIDE 0.4 MG/ML
0.2 INJECTION, SOLUTION INTRAMUSCULAR; INTRAVENOUS; SUBCUTANEOUS
Status: DISCONTINUED | OUTPATIENT
Start: 2021-01-08 | End: 2021-01-08 | Stop reason: HOSPADM

## 2021-01-08 RX ORDER — CEFAZOLIN SODIUM 2 G/100ML
2 INJECTION, SOLUTION INTRAVENOUS
Status: COMPLETED | OUTPATIENT
Start: 2021-01-08 | End: 2021-01-08

## 2021-01-08 RX ORDER — PROPOFOL 10 MG/ML
INJECTION, EMULSION INTRAVENOUS PRN
Status: DISCONTINUED | OUTPATIENT
Start: 2021-01-08 | End: 2021-01-08

## 2021-01-08 RX ORDER — HYDROCODONE BITARTRATE AND ACETAMINOPHEN 5; 325 MG/1; MG/1
1 TABLET ORAL
Status: COMPLETED | OUTPATIENT
Start: 2021-01-08 | End: 2021-01-08

## 2021-01-08 RX ORDER — NEOSTIGMINE METHYLSULFATE 1 MG/ML
VIAL (ML) INJECTION PRN
Status: DISCONTINUED | OUTPATIENT
Start: 2021-01-08 | End: 2021-01-08

## 2021-01-08 RX ORDER — DEXAMETHASONE SODIUM PHOSPHATE 4 MG/ML
INJECTION, SOLUTION INTRA-ARTICULAR; INTRALESIONAL; INTRAMUSCULAR; INTRAVENOUS; SOFT TISSUE PRN
Status: DISCONTINUED | OUTPATIENT
Start: 2021-01-08 | End: 2021-01-08

## 2021-01-08 RX ORDER — CEFAZOLIN SODIUM 1 G/3ML
1 INJECTION, POWDER, FOR SOLUTION INTRAMUSCULAR; INTRAVENOUS SEE ADMIN INSTRUCTIONS
Status: DISCONTINUED | OUTPATIENT
Start: 2021-01-08 | End: 2021-01-08 | Stop reason: HOSPADM

## 2021-01-08 RX ORDER — ONDANSETRON 2 MG/ML
INJECTION INTRAMUSCULAR; INTRAVENOUS PRN
Status: DISCONTINUED | OUTPATIENT
Start: 2021-01-08 | End: 2021-01-08

## 2021-01-08 RX ORDER — MEPERIDINE HYDROCHLORIDE 25 MG/ML
12.5 INJECTION INTRAMUSCULAR; INTRAVENOUS; SUBCUTANEOUS
Status: DISCONTINUED | OUTPATIENT
Start: 2021-01-08 | End: 2021-01-08 | Stop reason: HOSPADM

## 2021-01-08 RX ORDER — HYDROMORPHONE HYDROCHLORIDE 1 MG/ML
.3-.5 INJECTION, SOLUTION INTRAMUSCULAR; INTRAVENOUS; SUBCUTANEOUS EVERY 10 MIN PRN
Status: DISCONTINUED | OUTPATIENT
Start: 2021-01-08 | End: 2021-01-08 | Stop reason: HOSPADM

## 2021-01-08 RX ORDER — SODIUM CHLORIDE, SODIUM LACTATE, POTASSIUM CHLORIDE, CALCIUM CHLORIDE 600; 310; 30; 20 MG/100ML; MG/100ML; MG/100ML; MG/100ML
INJECTION, SOLUTION INTRAVENOUS CONTINUOUS PRN
Status: DISCONTINUED | OUTPATIENT
Start: 2021-01-08 | End: 2021-01-08

## 2021-01-08 RX ORDER — HYDRALAZINE HYDROCHLORIDE 20 MG/ML
2.5-5 INJECTION INTRAMUSCULAR; INTRAVENOUS EVERY 10 MIN PRN
Status: DISCONTINUED | OUTPATIENT
Start: 2021-01-08 | End: 2021-01-08 | Stop reason: HOSPADM

## 2021-01-08 RX ORDER — PROPOFOL 10 MG/ML
INJECTION, EMULSION INTRAVENOUS CONTINUOUS PRN
Status: DISCONTINUED | OUTPATIENT
Start: 2021-01-08 | End: 2021-01-08

## 2021-01-08 RX ADMIN — NEOSTIGMINE METHYLSULFATE 5 MG: 1 INJECTION, SOLUTION INTRAVENOUS at 09:40

## 2021-01-08 RX ADMIN — PHENYLEPHRINE HYDROCHLORIDE 100 MCG: 10 INJECTION INTRAVENOUS at 09:01

## 2021-01-08 RX ADMIN — FENTANYL CITRATE 50 MCG: 0.05 INJECTION, SOLUTION INTRAMUSCULAR; INTRAVENOUS at 10:13

## 2021-01-08 RX ADMIN — PHENYLEPHRINE HYDROCHLORIDE 100 MCG: 10 INJECTION INTRAVENOUS at 08:52

## 2021-01-08 RX ADMIN — ROCURONIUM BROMIDE 40 MG: 10 INJECTION INTRAVENOUS at 08:50

## 2021-01-08 RX ADMIN — PROPOFOL 200 MG: 10 INJECTION, EMULSION INTRAVENOUS at 08:49

## 2021-01-08 RX ADMIN — MIDAZOLAM 2 MG: 1 INJECTION INTRAMUSCULAR; INTRAVENOUS at 08:47

## 2021-01-08 RX ADMIN — PHENYLEPHRINE HYDROCHLORIDE 100 MCG: 10 INJECTION INTRAVENOUS at 09:07

## 2021-01-08 RX ADMIN — PHENYLEPHRINE HYDROCHLORIDE 100 MCG: 10 INJECTION INTRAVENOUS at 09:31

## 2021-01-08 RX ADMIN — SODIUM CHLORIDE, POTASSIUM CHLORIDE, SODIUM LACTATE AND CALCIUM CHLORIDE: 600; 310; 30; 20 INJECTION, SOLUTION INTRAVENOUS at 08:46

## 2021-01-08 RX ADMIN — DEXAMETHASONE SODIUM PHOSPHATE 4 MG: 4 INJECTION, SOLUTION INTRA-ARTICULAR; INTRALESIONAL; INTRAMUSCULAR; INTRAVENOUS; SOFT TISSUE at 08:56

## 2021-01-08 RX ADMIN — PROPOFOL 25 MCG/KG/MIN: 10 INJECTION, EMULSION INTRAVENOUS at 08:50

## 2021-01-08 RX ADMIN — FENTANYL CITRATE 50 MCG: 50 INJECTION, SOLUTION INTRAMUSCULAR; INTRAVENOUS at 08:49

## 2021-01-08 RX ADMIN — PHENYLEPHRINE HYDROCHLORIDE 200 MCG: 10 INJECTION INTRAVENOUS at 08:54

## 2021-01-08 RX ADMIN — HYDROMORPHONE HYDROCHLORIDE 0.5 MG: 1 INJECTION, SOLUTION INTRAMUSCULAR; INTRAVENOUS; SUBCUTANEOUS at 10:27

## 2021-01-08 RX ADMIN — CEFAZOLIN SODIUM 2 G: 2 INJECTION, SOLUTION INTRAVENOUS at 08:53

## 2021-01-08 RX ADMIN — GLYCOPYRROLATE 0.8 MG: 0.2 INJECTION, SOLUTION INTRAMUSCULAR; INTRAVENOUS at 09:40

## 2021-01-08 RX ADMIN — LIDOCAINE HYDROCHLORIDE 100 MG: 20 INJECTION, SOLUTION INFILTRATION; PERINEURAL at 08:49

## 2021-01-08 RX ADMIN — KETOROLAC TROMETHAMINE 15 MG: 15 INJECTION, SOLUTION INTRAMUSCULAR; INTRAVENOUS at 11:13

## 2021-01-08 RX ADMIN — HYDROCODONE BITARTRATE AND ACETAMINOPHEN 1 TABLET: 5; 325 TABLET ORAL at 11:26

## 2021-01-08 RX ADMIN — ONDANSETRON 4 MG: 2 INJECTION INTRAMUSCULAR; INTRAVENOUS at 11:31

## 2021-01-08 RX ADMIN — SODIUM CHLORIDE, POTASSIUM CHLORIDE, SODIUM LACTATE AND CALCIUM CHLORIDE: 600; 310; 30; 20 INJECTION, SOLUTION INTRAVENOUS at 11:33

## 2021-01-08 RX ADMIN — HYDROMORPHONE HYDROCHLORIDE 0.5 MG: 1 INJECTION, SOLUTION INTRAMUSCULAR; INTRAVENOUS; SUBCUTANEOUS at 09:20

## 2021-01-08 RX ADMIN — PHENYLEPHRINE HYDROCHLORIDE 100 MCG: 10 INJECTION INTRAVENOUS at 08:58

## 2021-01-08 RX ADMIN — ONDANSETRON 4 MG: 2 INJECTION INTRAMUSCULAR; INTRAVENOUS at 08:55

## 2021-01-08 RX ADMIN — FENTANYL CITRATE 50 MCG: 50 INJECTION, SOLUTION INTRAMUSCULAR; INTRAVENOUS at 09:16

## 2021-01-08 RX ADMIN — FENTANYL CITRATE 50 MCG: 0.05 INJECTION, SOLUTION INTRAMUSCULAR; INTRAVENOUS at 09:58

## 2021-01-08 ASSESSMENT — MIFFLIN-ST. JEOR: SCORE: 1652.23

## 2021-01-08 NOTE — RESULT ENCOUNTER NOTE
Darrell,    Your PSA was a little higher at 9.71.     Follow up with Dr. Gonzalez.    Chelo Gastelum PA-C

## 2021-01-08 NOTE — PROGRESS NOTES
RN at chairside to assess patient's nausea. Darrell states his nausea is improving but requests to nap for a bit before attempting discharge instructions.

## 2021-01-08 NOTE — BRIEF OP NOTE
[facility]  General Surgery Brief Operative Note    Pre-operative diagnosis: Incisional hernia, without obstruction or gangrene [K43.2]   Post-operative diagnosis Same   Procedure: Procedure(s):  LAPAROSCOPIC VENTRAL HERNIA REPAIR WITH MESH    Surgeon(s), Assistant(s): Surgeon(s) and Role:     * Sivakumar Burr MD - Primary     * Marj Nair PA-C - Assisting   Estimated blood loss: 5 mL    Drains: None   Specimens: * No specimens in log *   Findings: None.   Complications:  Implants:  Condition: None  None  Stable   Comments:      Marj Nair PA-C  [facility]         See dictated operative report for full details

## 2021-01-08 NOTE — ANESTHESIA CARE TRANSFER NOTE
Patient: Sarkis Darnell    Procedure(s):  LAPAROSCOPIC VENTRAL HERNIA REPAIR WITH MESH    Diagnosis: Incisional hernia, without obstruction or gangrene [K43.2]  Diagnosis Additional Information: No value filed.    Anesthesia Type:   General     Note:  Airway :Face Mask  Patient transferred to:PACU  Comments: Anesthesia Care Note    Patient: Sarkis Darnell    Transferred to: PACU    Patient vital signs: Stable    Airway: FM    Monitors placed. VSS. PIV patent. No change in dentition. Report given to RN.      Zoe Thorne CRNA   1/8/2021  Handoff Report: Identifed the Patient, Identified the Reponsible Provider, Reviewed the pertinent medical history, Discussed the surgical course, Reviewed Intra-OP anesthesia mangement and issues during anesthesia, Set expectations for post-procedure period and Allowed opportunity for questions and acknowledgement of understanding      Vitals: (Last set prior to Anesthesia Care Transfer)    CRNA VITALS  1/8/2021 0918 - 1/8/2021 0952      1/8/2021             Pulse:  90    Ht Rate:  90    SpO2:  92 %    Resp Rate (observed):  (!) 4                Electronically Signed By: YOSI Samson CRNA  January 8, 2021  9:52 AM

## 2021-01-08 NOTE — DISCHARGE INSTRUCTIONS
[facility]  Discharge Instructions: Post-Operative Laparoscopic Ventral Hernia Repair    ACTIVITY    Take frequent, short walks and increase your activity gradually.      Avoid strenuous physical activity or heavy lifting greater than 15 lbs. for 4 weeks.  You may climb stairs.    You may drive without restrictions when you are not using any prescription pain medication and feel comfortable in a car.    You may return to work/school when you are comfortable without any prescription pain medication.     You may wear an abdominal binder for comfort for 2-3 weeks from your surgery.  You can wash the abdominal binder and dry it on low heat in the dryer.    WOUND CARE    You may remove your outer dressing or Band-Aids and shower 48 hours after the surgery.  Pat your incisions dry and leave them open to air.  Re-apply dressing (Band-Aids or gauze/tape) as needed for comfort or drainage.    You may have steri-strips (looks like white tape) on your incision.  You may peel off the steri-strips 2 weeks after your surgery if they have not peeled off on their own.     Do not soak your incisions in a tub or pool for 2 weeks.     Do not apply any lotions, creams, or ointments to your incisions.    A ridge under your incisions is normal and will gradually resolve.    DIET    Start with liquids, then gradually resume your regular diet as tolerated.     Drink plenty of fluids to stay hydrated.    PAIN    Expect some tenderness and discomfort at the incision site(s).  Use the prescribed pain medication/muscle relaxant at your discretion.  Expect gradual resolution of your pain over several days.    You may take ibuprofen with food (unless you have been told not to) instead of or in addition to your prescribed pain medication.  If you are taking Norco or Percocet, do not take any additional acetaminophen/Tylenol.    Do not drink alcohol or drive while you are taking pain medications.    You may apply ice  to your incisions in 20 minute intervals as needed for the next 48 hours.  After that time, consider switching to heat if you prefer.    EXPECTATIONS    Pain medications can cause constipation.  Limit use when possible.  Take over the counter stool softener/stimulant, such as Colace or Senna, 1-2 times a day with plenty of water.  You may take a mild over the counter laxative, such as Miralax or a suppository, as needed.  You may take 1 oz. (2 tablespoons) Milk of Magnesia the evening following surgery to encourage bowel movement.  You make discontinue these medications once you are having regular bowel movements and/or are no longer taking your narcotic pain medication.    You may have shoulder or upper back discomfort due to the gas used in surgery.  This is temporary and should resolve in 48-72 hours.  Short, frequent walks may help with this.    If you are unable to urinate, or feel as though you are not emptying your bladder adequately, we recommend you call our office and/or seek care at an ER or Urgent Care facility if after hours.    FOLLOW UP    Our office will contact you in approximately 2 weeks to check on your progress and answer any questions you may have.  If you are doing well, you will not need to return for a follow up appointment.  If any concerns are identified over the phone, we will help you make an appointment to see a provider.  If you have not received a phone call, have any questions or concerns, or would like to be seen, please call us at [phone] and ask to speak with our nurse.  We are located at [address] [place] [address].    CALL OUR OFFICE -[phone] IF YOU HAVE:     Chills or fever above 101 F.    Increased redness or drainage at your incisions.    Significant bleeding.    Pain not relieved by your pain medication or rest.    Increasing pain after the first 48 hours.    Any other concerns or questions.           Revised September 2020      Same Day  Surgery Discharge Instructions for  Sedation and General Anesthesia       It's not unusual to feel dizzy, light-headed or faint for up to 24 hours after surgery or while taking pain medication.  If you have these symptoms: sit for a few minutes before standing and have someone assist you when you get up to walk or use the bathroom.      You should rest and relax for the next 24 hours. We recommend you make arrangements to have an adult stay with you for at least 24 hours after your discharge.  Avoid hazardous and strenuous activity.      DO NOT DRIVE any vehicle or operate mechanical equipment for 24 hours following the end of your surgery.  Even though you may feel normal, your reactions may be affected by the medication you have received.      Do not drink alcoholic beverages for 24 hours following surgery.       Slowly progress to your regular diet as you feel able. It's not unusual to feel nauseated and/or vomit after receiving anesthesia.  If you develop these symptoms, drink clear liquids (apple juice, ginger ale, broth, 7-up, etc. ) until you feel better.  If your nausea and vomiting persists for 24 hours, please notify your surgeon.        All narcotic pain medications, along with inactivity and anesthesia, can cause constipation. Drinking plenty of liquids and increasing fiber intake will help.      For any questions of a medical nature, call your surgeon.      Do not make important decisions for 24 hours.      If you had general anesthesia, you may have a sore throat for a couple of days related to the breathing tube used during surgery.  You may use Cepacol lozenges to help with this discomfort.  If it worsens or if you develop a fever, contact your surgeon.       If you feel your pain is not well managed with the pain medications prescribed by your surgeon, please contact your surgeon's office to let them know so they can address your concerns.       CoVid 19 Information    We want to give you  information regarding Covid. Please consult your primary care provider with any questions you might have.     Patient who have symptoms (cough, fever, or shortness of breath), need to isolate for 7 days from when symptoms started OR 72 hours after fever resolves (without fever reducing medications) AND improvement of respiratory symptoms (whichever is longer).      Isolate yourself at home (in own room/own bathroom if possible)    Do Not allow any visitors    Do Not go to work or school    Do Not go to Confucianist,  centers, shopping, or other public places.    Do Not shake hands.    Avoid close and intimate contact with others (hugging, kissing).    Follow CDC recommendations for household cleaning of frequently touched services.     After the initial 7 days, continue to isolate yourself from household members as much as possible. To continue decrease the risk of community spread and exposure, you and any members of your household should limit activities in public for 14 days after starting home isolation.     You can reference the following CDC link for helpful home isolation/care tips:  https://www.cdc.gov/coronavirus/2019-ncov/downloads/10Things.pdf    Protect Others:    Cover Your Mouth and Nose with a mask, disposable tissue or wash cloth to avoid spreading germs to others.    Wash your hands and face frequently with soap and water    Call Your Primary Doctor If: Breathing difficulty develops or you become worse.    For more information about COVID19 and options for caring for yourself at home, please visit the CDC website at https://www.cdc.gov/coronavirus/2019-ncov/about/steps-when-sick.html  For more options for care at [facility], please visit our website at [email or web address]      Today you received Toradol, an antiinflammatory medication similar to Ibuprofen.  You should not take other antiinflammatory medication, such as Ibuprofen, Motrin, Advil, Aleve,  Naprosyn, etc until 5:15 PM.     **If you have concerns or questions about your procedure,    please contact Dr Burr at  [phone]**

## 2021-01-08 NOTE — ANESTHESIA PROCEDURE NOTES
Airway   Date/Time: 1/8/2021 8:52 AM   Patient location during procedure: OR    Staff -   CRNA: Bindu Thorne APRN CRNA  Performed By: CRNA    Consent for Airway   Urgency: elective    Indications and Patient Condition  Indications for airway management: bello-procedural  Induction type:intravenous    Final Airway Details  Final airway type: endotracheal airway  Successful airway:ETT - single  Endotracheal Airway Details   ETT size (mm): 8.0  Cuffed: yes  Successful intubation technique: video laryngoscopy  Grade View of Cords: 1  Adjucts: stylet  Measured from: gums/teeth  Secured with: pink tape  Bite block used: None    Post intubation assessment   Placement verified by: capnometry, equal breath sounds and chest rise   Number of attempts at approach: 1  Number of other approaches attempted: 0  Secured with:pink tape  Ease of procedure: easy  Dentition: Intact and Unchanged

## 2021-01-08 NOTE — OP NOTE
General Surgery Operative Note    PREOPERATIVE DIAGNOSIS:  Incisional hernia, without obstruction or gangrene [K43.2]    POSTOPERATIVE DIAGNOSIS: Same    PROCEDURE:   Procedure(s):  LAPAROSCOPIC VENTRAL HERNIA REPAIR WITH MESH    ANESTHESIA:  General.    PREOPERATIVE MEDICATIONS: Ancef    SURGEON:  Sivakumar Burr MD    ASSISTANT:  Marj Nair PA-C.  Assistant was required owing to challenging exposure and need for retraction.    INDICATIONS: Patient is several years status post laparoscopic cholecystectomy.  Developed bulging along the umbilical site.  Now presents for laparoscopic ventral hernia repair.    PROCEDURE:  The patient was taken to the operating suite and uneventfully endotracheally intubated.  The abdomen was prepped and draped in a sterile fashion.  Surgeon initiated timeout was acknowledged.  Ioban was placed over the abdomen. We entered the abdomen in the left upper quadrant using Visiport technique. Two other trochars were placed under laparoscopic visualization. Upon entering the abdomen there was significant omental fat stuck up to the hernia defect anteriorly. This was all reduced. Using a combination of blunt dissection and electrocautery we are able to completely take down all adhesions from the inside of the hernia defect.  Primary hernia defect measured 3 centimeters. We then set about primarily closing the fascial defect. A small stab incision was made over the defect and I used O Vicryl sutures and the Abhi Marcelo device to primarily close the defect in a transverse orientation. This was done with several sutures. Once the Defect was primarily closed I elected to use a 10 x 15 cm piece of Symbotex mesh.  O Vicryl sutures are placed at the apices and the mesh was oriented within the abdomen. I secured the mesh with the fascial closure device and used numerous absorbable tacks to secure the mesh circumferentially throughout its surface.  The abdomen was inspected for hemostasis.  We  removed the 11 mm trocar and closed the fascial defect with a 0 Vicryl suture and the Abhi Marcelo device.  The skin edges were reapproximated with 4-0 Vicryl and Steri-Strips.  The patient was uneventfully extubated, awakened and taken to the PACU in stable condition.  At the conclusion of the case, all lap and needle counts were correct.      ESTIMATED BLOOD LOSS: 5 mL    INTRAOPERATIVE FINDINGS: 3 cm incisional hernia defect closed primarily and reinforced with 10 x 15 cm intraperitoneal mesh.    Sivakumar Burr MD, MD

## 2021-01-08 NOTE — ANESTHESIA POSTPROCEDURE EVALUATION
Patient: Sarkis Darnell    Procedure(s):  LAPAROSCOPIC VENTRAL HERNIA REPAIR WITH MESH    Diagnosis:Incisional hernia, without obstruction or gangrene [K43.2]  Diagnosis Additional Information: No value filed.    Anesthesia Type:  General    Note:  Anesthesia Post Evaluation    Patient location during evaluation: PACU  Patient participation: Able to fully participate in evaluation  Level of consciousness: awake  Pain management: adequate  Airway patency: patent  Cardiovascular status: acceptable  Respiratory status: acceptable  Hydration status: acceptable  PONV: none     Anesthetic complications: None          Last vitals:  Vitals:    01/08/21 1100 01/08/21 1115 01/08/21 1130   BP: (!) 125/91 (!) 138/94 120/82   Pulse: 75 90 85   Resp: 14 12 11   Temp: 36  C (96.8  F) 36.2  C (97.2  F) 36.2  C (97.2  F)   SpO2: 95% 96% 94%         Electronically Signed By: LUNA ARTEAGA MD  January 8, 2021  2:34 PM

## 2021-01-08 NOTE — ANESTHESIA PREPROCEDURE EVALUATION
Anesthesia Pre-Procedure Evaluation    Patient: Sarkis Darnell   MRN: [id number] : 1960          Preoperative Diagnosis: Incisional hernia, without obstruction or gangrene [K43.2]    Procedure(s):  LAPAROSCOPIC VENTRAL HERNIA REPAIR WITH MESH    Past Medical History:   Diagnosis Date     BPH (benign prostatic hyperplasia)     Dr. Gonzalez     Chest pain     neg est echo     Elevated PSA     Dr. Gonzalez, bx neg     Erectile dysfunction     Dr. Gonzalez     Gastritis      Hypercholesteremia      Hypothyroid      Normal colonoscopy      Vitamin D deficiency 2009    Vitamin D2/D3 of 19     Past Surgical History:   Procedure Laterality Date     LAPAROSCOPIC CHOLECYSTECTOMY N/A 2015    Procedure: LAPAROSCOPIC CHOLECYSTECTOMY;  Surgeon: Sivakumar Burr MD;  Location: [facility]     PROSTATE SURGERY      Trus BX       Anesthesia Evaluation     .             ROS/MED HX    ENT/Pulmonary:      (-) sleep apnea   Neurologic:       Cardiovascular:     (+) Dyslipidemia, ----. : . . . :. .       METS/Exercise Tolerance:     Hematologic:         Musculoskeletal:         GI/Hepatic:     (+) GERD       Renal/Genitourinary:     (+) BPH,       Endo:     (+) thyroid problem hypothyroidism, .      Psychiatric:         Infectious Disease:         Malignancy:         Other:                          Physical Exam  Normal systems: cardiovascular and pulmonary    Airway   Mallampati: II  TM distance: >3 FB  Neck ROM: full    Dental   (+) implants    Cardiovascular       Pulmonary             Lab Results   Component Value Date    WBC 8.0 2019    HGB 15.8 2021    HCT 45.1 2019     2019    CRP 2.3 2009     2019    POTASSIUM 4.1 2019    CHLORIDE 105 2019    CO2 23 2019    BUN 11 2019    CR 0.94 2019    GLC 90 2019    FLOR 8.5 2019    ALBUMIN 3.8 2019    PROTTOTAL 7.3 2019    ALT 37 2019    AST 26 2019     "ALKPHOS 102 06/17/2019    BILITOTAL 0.5 06/17/2019    LIPASE 405 (H) 10/25/2015    TSH 2.79 01/07/2021    T4 1.35 05/04/2015       Preop Vitals  BP Readings from Last 3 Encounters:   01/08/21 123/65   01/07/21 108/72   12/17/20 106/70    Pulse Readings from Last 3 Encounters:   01/08/21 70   01/07/21 73   12/17/20 68      Resp Readings from Last 3 Encounters:   01/08/21 16   11/16/15 12   01/23/14 16    SpO2 Readings from Last 3 Encounters:   01/08/21 97%   01/07/21 100%   03/04/20 98%      Temp Readings from Last 1 Encounters:   01/08/21 35.9  C (96.6  F) (Temporal)    Ht Readings from Last 1 Encounters:   01/08/21 1.676 m (5' 6\")      Wt Readings from Last 1 Encounters:   01/08/21 89.9 kg (198 lb 4.8 oz)    Estimated body mass index is 32.01 kg/m  as calculated from the following:    Height as of this encounter: 1.676 m (5' 6\").    Weight as of this encounter: 89.9 kg (198 lb 4.8 oz).       Anesthesia Plan      History & Physical Review  History and physical reviewed and following examination; no interval change.    ASA Status:  2 .    NPO Status:  > 8 hours    Plan for General with Intravenous induction. Maintenance will be Balanced.    PONV prophylaxis:  Ondansetron (or other 5HT-3)         Postoperative Care  Postoperative pain management:  IV analgesics.      Consents  Anesthetic plan, risks, benefits and alternatives discussed with:  Patient..                 LUNA ARTEAGA MD  "

## 2021-01-11 ENCOUNTER — TELEPHONE (OUTPATIENT)
Dept: SURGERY | Facility: CLINIC | Age: 61
End: 2021-01-11

## 2021-01-11 NOTE — TELEPHONE ENCOUNTER
Procedure:  Laparoscopic ventral hernia repair with mesh    Date:  01/08/2021    Surgeon:  Aminah    Patient c/o constipation.  Up until last night, he was also not passing any flatus and feeling nauseated, acid reflux.    He has been taking senna and has tried suppositories and an enema.  He is finally passing some flatus, but no bowel movements, other than very minimal with suppository.    Encouraged him to try milk of magnesia twice a day until successful.  Can also add in prune juice.    If symptoms persist or if they get worse. Any increasing pain, n/v, and inability to pass bowels or flatus, he should go to ER for further evaluation.    He is in agreement.    He has not taken his abdominal binder off yet. Has not showered.  He reports top left incision is very sensitive in comparison to the others.  Advised him to remove abdominal binder and shower tomorrow. He should call if he feels incision does not look right.    He agreed.    He will call PRN.    Lucy Vaughn RN-BSN

## 2021-01-11 NOTE — TELEPHONE ENCOUNTER
1/8/21 lap ventral hernia repair MRG, having BM issues    Please call    Phone: [phone]    Message ok

## 2021-01-21 ENCOUNTER — TELEPHONE (OUTPATIENT)
Dept: SURGERY | Facility: CLINIC | Age: 61
End: 2021-01-21

## 2021-01-21 NOTE — TELEPHONE ENCOUNTER
Name of caller: Patient    Reason for Call:  Symptoms  Patient is still in considerable pain and has a lot of tenderness around surgical site.  Would like to discuss with nurse.    Surgeon:  Dr. Burr     Recent Surgery:  Yes.    If yes, when & what type:  1/8/2021    LAPAROSCOPIC VENTRAL HERNIA REPAIR WITH MESH      Best phone number to reach pt at is: [phone]    Ok to leave a message with medical info? Yes.      
Patient called with concerns about abdominal pain around his umbilicus.  He is s/p lap ventral hernia by Dr. Burr on 1/8/21.  He states this is new and denies any increased physical activity, fever, or changes in BM.  We discussed the surgery and mesh placement.  I recommend he continue Tylenol up to 3000mg alternating with Ibuprofen with food. He should give it another 1-2 weeks and follow up if his pain continues.  He is concerned and would like to follow up with Dr. Burr. An appointment was made for Tuesday 1/2621 at 10:15am.  He can cancel if he is feeling well. He states he understands the discussion.    Marj Nair PA-C   
Postpartum state

## 2021-01-26 ENCOUNTER — OFFICE VISIT (OUTPATIENT)
Dept: SURGERY | Facility: CLINIC | Age: 61
End: 2021-01-26
Payer: COMMERCIAL

## 2021-01-26 DIAGNOSIS — Z09 SURGERY FOLLOW-UP EXAMINATION: Primary | ICD-10-CM

## 2021-01-26 PROCEDURE — 99024 POSTOP FOLLOW-UP VISIT: CPT | Performed by: SURGERY

## 2021-01-26 NOTE — PROGRESS NOTES
Surgery Postop Note    Sarkis Darnell presents today for surgical followup.  he is doing well following lap ventral hernia repair.  Incisions look fine with no signs of wound infection.  His pain is reduced.  I expect him to make a complete recovery.  Thank you for the opportunity to help in his care.    Deshawn Burr M.D.  [facility]  [phone]    Please route or send letter to:  Primary Care Provider (PCP) and Referring Provider

## 2021-01-26 NOTE — LETTER
January 26, 2021          Referred MD Fede  No address on file      RE:   Sarkis Darnell 1960      Dear Colleague,    Thank you for referring your patient, Sarkis Darnell, to [facility] at [facility]. Please see a copy of my visit note below.    Surgery Postop Note     Sarkis Darnell presents today for surgical followup.  he is doing well following lap ventral hernia repair.  Incisions look fine with no signs of wound infection.  His pain is reduced.  I expect him to make a complete recovery.    Again, thank you for allowing me to participate in the care of your patient.      Sincerely,      Sivakumar Burr MD

## 2021-01-27 DIAGNOSIS — R97.20 ELEVATED PROSTATE SPECIFIC ANTIGEN (PSA): Primary | ICD-10-CM

## 2021-01-28 ENCOUNTER — OFFICE VISIT (OUTPATIENT)
Dept: UROLOGY | Facility: CLINIC | Age: 61
End: 2021-01-28
Payer: COMMERCIAL

## 2021-01-28 VITALS
HEART RATE: 78 BPM | SYSTOLIC BLOOD PRESSURE: 120 MMHG | BODY MASS INDEX: 31.82 KG/M2 | HEIGHT: 66 IN | DIASTOLIC BLOOD PRESSURE: 68 MMHG | WEIGHT: 198 LBS

## 2021-01-28 DIAGNOSIS — R39.12 BENIGN PROSTATIC HYPERPLASIA WITH WEAK URINARY STREAM: ICD-10-CM

## 2021-01-28 DIAGNOSIS — R97.20 ELEVATED PROSTATE SPECIFIC ANTIGEN (PSA): ICD-10-CM

## 2021-01-28 DIAGNOSIS — R33.9 URINARY RETENTION: ICD-10-CM

## 2021-01-28 DIAGNOSIS — R39.15 URGENCY OF URINATION: Primary | ICD-10-CM

## 2021-01-28 DIAGNOSIS — N40.1 BENIGN PROSTATIC HYPERPLASIA WITH WEAK URINARY STREAM: ICD-10-CM

## 2021-01-28 LAB
ALBUMIN UR-MCNC: 100 MG/DL
APPEARANCE UR: CLEAR
BILIRUB UR QL STRIP: NEGATIVE
COLOR UR AUTO: YELLOW
GLUCOSE UR STRIP-MCNC: NEGATIVE MG/DL
HGB UR QL STRIP: NEGATIVE
KETONES UR STRIP-MCNC: NEGATIVE MG/DL
LEUKOCYTE ESTERASE UR QL STRIP: NEGATIVE
NITRATE UR QL: NEGATIVE
PH UR STRIP: 6 PH (ref 5–7)
RESIDUAL VOLUME (RV) (EXTERNAL): 200
SOURCE: ABNORMAL
SP GR UR STRIP: >1.03 (ref 1–1.03)
UROBILINOGEN UR STRIP-ACNC: 0.2 EU/DL (ref 0.2–1)

## 2021-01-28 PROCEDURE — 81003 URINALYSIS AUTO W/O SCOPE: CPT | Mod: QW | Performed by: UROLOGY

## 2021-01-28 PROCEDURE — 99213 OFFICE O/P EST LOW 20 MIN: CPT | Mod: 25 | Performed by: UROLOGY

## 2021-01-28 PROCEDURE — 51798 US URINE CAPACITY MEASURE: CPT | Performed by: UROLOGY

## 2021-01-28 RX ORDER — TAMSULOSIN HYDROCHLORIDE 0.4 MG/1
0.4 CAPSULE ORAL DAILY
Qty: 90 CAPSULE | Refills: 3 | Status: SHIPPED | OUTPATIENT
Start: 2021-01-28 | End: 2022-02-08

## 2021-01-28 RX ORDER — FINASTERIDE 5 MG/1
5 TABLET, FILM COATED ORAL DAILY
Qty: 90 TABLET | Refills: 3 | Status: SHIPPED | OUTPATIENT
Start: 2021-01-28 | End: 2022-02-08

## 2021-01-28 ASSESSMENT — MIFFLIN-ST. JEOR: SCORE: 1650.87

## 2021-01-28 ASSESSMENT — PAIN SCALES - GENERAL: PAINLEVEL: MODERATE PAIN (4)

## 2021-01-28 NOTE — LETTER
1/28/2021       RE: Sarkis Darnell  [address]  [place] [address]     Dear Colleague,    Thank you for referring your patient, Sarkis Darnell, to the [facility] [place] at [facility]. Please see a copy of my visit note below.    Mr. Darnell is a 60-year-old male with a history of elevated PSA, most recently 9.71.  He had negative biopsies 4 years ago, nonsuspicious MRI in 2018 and again last April.  His prostate size has grown to 65 cc.  Other past medical history: Chest pain, ED, gastritis, high cholesterol, hypothyroidism, vitamin D deficiency, non-smoker, laparoscopic cholecystectomy, laparoscopic ventral herniorrhaphy.  No family history of prostate cancer  Medications: Synthroid  Allergies: Dust mites  Review of systems: More urinary urgency, no dysuria or gross hematuria.  He has not been using Trimix but will call for refills in the future-may need to increase dose slightly for a more rigid erection  Urinalysis normal  Postvoid residual: 200 cc  Exam: Alert and oriented, normal appearance, normal vital signs.  Normal respirations, neuro grossly intact.  Normal sphincter tone, no rectal mass or impaction, benign and symmetric prostate with no firmness, nodule or asymmetry.  Normal seminal vesicles  Assessment: BPH, elevated PSA with negative biopsies in 2 nonsuspicious MRIs in the past and no family history of prostate cancer.  Discussed options of-observation, repeat MRI, repeat biopsies  Suggest starting tamsulosin 0.4 mg daily 30 minutes after evening meal and finasteride 5 mg daily-discussed mechanism of action, side effects, follow-up.  He may be able to stop his tamsulosin in 18 months and continue finasteride long-term.  See Dr. Linares in 6 months for SARBJIT and PSA, urinalysis and postvoid residual      Sincerely,    Micah Gonzalez MD

## 2021-01-28 NOTE — NURSING NOTE
Here for elevated PSA  Had hernia surgery a few weeks ago. Since surgery has more urgency  And slow to start stream. Bladder scan was 200ml

## 2021-01-28 NOTE — PROGRESS NOTES
Mr. Darnell is a 60-year-old male with a history of elevated PSA, most recently 9.71.  He had negative biopsies 4 years ago, nonsuspicious MRI in 2018 and again last April.  His prostate size has grown to 65 cc.  Other past medical history: Chest pain, ED, gastritis, high cholesterol, hypothyroidism, vitamin D deficiency, non-smoker, laparoscopic cholecystectomy, laparoscopic ventral herniorrhaphy.  No family history of prostate cancer  Medications: Synthroid  Allergies: Dust mites  Review of systems: More urinary urgency, no dysuria or gross hematuria.  He has not been using Trimix but will call for refills in the future-may need to increase dose slightly for a more rigid erection  Urinalysis normal  Postvoid residual: 200 cc  Exam: Alert and oriented, normal appearance, normal vital signs.  Normal respirations, neuro grossly intact.  Normal sphincter tone, no rectal mass or impaction, benign and symmetric prostate with no firmness, nodule or asymmetry.  Normal seminal vesicles  Assessment: BPH, elevated PSA with negative biopsies in 2 nonsuspicious MRIs in the past and no family history of prostate cancer.  Discussed options of-observation, repeat MRI, repeat biopsies  Suggest starting tamsulosin 0.4 mg daily 30 minutes after evening meal and finasteride 5 mg daily-discussed mechanism of action, side effects, follow-up.  He may be able to stop his tamsulosin in 18 months and continue finasteride long-term.  See Dr. Linares in 6 months for SARBJIT and PSA, urinalysis and postvoid residual

## 2021-03-16 ENCOUNTER — TELEPHONE (OUTPATIENT)
Dept: UROLOGY | Facility: CLINIC | Age: 61
End: 2021-03-16

## 2021-03-16 NOTE — TELEPHONE ENCOUNTER
[facility] Call Center    Phone Message    May a detailed message be left on voicemail: yes     Reason for Call: Other: Pt calling in to speak with Dr. Gonzalez about new developement with prostate and surgery. Please contact pt at [phone]. Thank you.     Action Taken: Message routed to:  [facility]  clinical pool    Travel Screening: Not Applicable

## 2021-03-23 NOTE — TELEPHONE ENCOUNTER
[facility] Call Center    Phone Message    May a detailed message be left on voicemail: yes     Reason for Call: Other: Pt returning Dr. Gonzalez's call from last week. Please contact pt at [phone]. Thank you.     Action Taken: Message routed to:  [facility]  clinical pool    Travel Screening: Not Applicable

## 2021-03-24 ENCOUNTER — TELEPHONE (OUTPATIENT)
Dept: UROLOGY | Facility: CLINIC | Age: 61
End: 2021-03-24

## 2021-03-24 DIAGNOSIS — N52.9 ED (ERECTILE DYSFUNCTION): Primary | ICD-10-CM

## 2021-03-24 NOTE — TELEPHONE ENCOUNTER
Rx e-scribed to the [facility] pharmacy as requested.  BRANDEE Chirinos RN      ----- Message from Micah Gonzalez MD sent at 3/23/2021  5:10 PM CDT -----  Please renew trimix #9, 1 refill  Thank you!

## 2021-03-31 ENCOUNTER — IMMUNIZATION (OUTPATIENT)
Dept: NURSING | Facility: CLINIC | Age: 61
End: 2021-03-31
Payer: COMMERCIAL

## 2021-03-31 PROCEDURE — 0001A PR COVID VAC PFIZER DIL RECON 30 MCG/0.3 ML IM: CPT

## 2021-03-31 PROCEDURE — 91300 PR COVID VAC PFIZER DIL RECON 30 MCG/0.3 ML IM: CPT

## 2021-04-21 ENCOUNTER — IMMUNIZATION (OUTPATIENT)
Dept: NURSING | Facility: CLINIC | Age: 61
End: 2021-04-21
Attending: INTERNAL MEDICINE
Payer: COMMERCIAL

## 2021-04-21 PROCEDURE — 91300 PR COVID VAC PFIZER DIL RECON 30 MCG/0.3 ML IM: CPT

## 2021-04-21 PROCEDURE — 0002A PR COVID VAC PFIZER DIL RECON 30 MCG/0.3 ML IM: CPT

## 2021-05-03 ENCOUNTER — ANCILLARY PROCEDURE (OUTPATIENT)
Dept: GENERAL RADIOLOGY | Facility: CLINIC | Age: 61
End: 2021-05-03
Attending: INTERNAL MEDICINE
Payer: COMMERCIAL

## 2021-05-03 ENCOUNTER — OFFICE VISIT (OUTPATIENT)
Dept: FAMILY MEDICINE | Facility: CLINIC | Age: 61
End: 2021-05-03
Payer: COMMERCIAL

## 2021-05-03 VITALS
WEIGHT: 200 LBS | DIASTOLIC BLOOD PRESSURE: 76 MMHG | SYSTOLIC BLOOD PRESSURE: 118 MMHG | HEART RATE: 80 BPM | HEIGHT: 66 IN | BODY MASS INDEX: 32.14 KG/M2

## 2021-05-03 DIAGNOSIS — N40.0 BENIGN NON-NODULAR PROSTATIC HYPERPLASIA WITHOUT LOWER URINARY TRACT SYMPTOMS: ICD-10-CM

## 2021-05-03 DIAGNOSIS — E03.9 HYPOTHYROIDISM, UNSPECIFIED TYPE: ICD-10-CM

## 2021-05-03 DIAGNOSIS — R10.9 ABDOMINAL PRESSURE: ICD-10-CM

## 2021-05-03 DIAGNOSIS — R40.0 DROWSINESS: ICD-10-CM

## 2021-05-03 DIAGNOSIS — K21.9 GASTROESOPHAGEAL REFLUX DISEASE WITHOUT ESOPHAGITIS: ICD-10-CM

## 2021-05-03 DIAGNOSIS — E55.9 VITAMIN D DEFICIENCY: ICD-10-CM

## 2021-05-03 DIAGNOSIS — R07.89 CHEST PRESSURE: ICD-10-CM

## 2021-05-03 DIAGNOSIS — Z00.00 ENCOUNTER FOR ANNUAL PHYSICAL EXAM: Primary | ICD-10-CM

## 2021-05-03 DIAGNOSIS — K21.9 GASTROESOPHAGEAL REFLUX DISEASE, UNSPECIFIED WHETHER ESOPHAGITIS PRESENT: ICD-10-CM

## 2021-05-03 DIAGNOSIS — E78.00 HYPERCHOLESTEREMIA: ICD-10-CM

## 2021-05-03 DIAGNOSIS — R97.20 ELEVATED PSA: ICD-10-CM

## 2021-05-03 PROBLEM — K43.2 INCISIONAL HERNIA, WITHOUT OBSTRUCTION OR GANGRENE: Status: RESOLVED | Noted: 2020-12-17 | Resolved: 2021-05-03

## 2021-05-03 LAB
DEPRECATED CALCIDIOL+CALCIFEROL SERPL-MC: 18 UG/L (ref 20–75)
ERYTHROCYTE [DISTWIDTH] IN BLOOD BY AUTOMATED COUNT: 13.3 % (ref 10–15)
HCT VFR BLD AUTO: 44.7 % (ref 40–53)
HGB BLD-MCNC: 15.1 G/DL (ref 13.3–17.7)
HIV 1+2 AB+HIV1 P24 AG SERPL QL IA: NONREACTIVE
MCH RBC QN AUTO: 30 PG (ref 26.5–33)
MCHC RBC AUTO-ENTMCNC: 33.8 G/DL (ref 31.5–36.5)
MCV RBC AUTO: 89 FL (ref 78–100)
PLATELET # BLD AUTO: 271 10E9/L (ref 150–450)
RBC # BLD AUTO: 5.04 10E12/L (ref 4.4–5.9)
TSH SERPL DL<=0.005 MIU/L-ACNC: 1.89 MU/L (ref 0.4–4)
WBC # BLD AUTO: 7.8 10E9/L (ref 4–11)

## 2021-05-03 PROCEDURE — 82306 VITAMIN D 25 HYDROXY: CPT | Performed by: INTERNAL MEDICINE

## 2021-05-03 PROCEDURE — 84443 ASSAY THYROID STIM HORMONE: CPT | Performed by: INTERNAL MEDICINE

## 2021-05-03 PROCEDURE — 36415 COLL VENOUS BLD VENIPUNCTURE: CPT | Performed by: INTERNAL MEDICINE

## 2021-05-03 PROCEDURE — 87389 HIV-1 AG W/HIV-1&-2 AB AG IA: CPT | Performed by: INTERNAL MEDICINE

## 2021-05-03 PROCEDURE — 71046 X-RAY EXAM CHEST 2 VIEWS: CPT | Performed by: RADIOLOGY

## 2021-05-03 PROCEDURE — 99213 OFFICE O/P EST LOW 20 MIN: CPT | Mod: 25 | Performed by: INTERNAL MEDICINE

## 2021-05-03 PROCEDURE — 85027 COMPLETE CBC AUTOMATED: CPT | Performed by: INTERNAL MEDICINE

## 2021-05-03 PROCEDURE — 80053 COMPREHEN METABOLIC PANEL: CPT | Performed by: INTERNAL MEDICINE

## 2021-05-03 PROCEDURE — 99396 PREV VISIT EST AGE 40-64: CPT | Performed by: INTERNAL MEDICINE

## 2021-05-03 PROCEDURE — 80061 LIPID PANEL: CPT | Performed by: INTERNAL MEDICINE

## 2021-05-03 RX ORDER — PANTOPRAZOLE SODIUM 40 MG/1
40 TABLET, DELAYED RELEASE ORAL DAILY
Qty: 90 TABLET | Refills: 1 | Status: SHIPPED | OUTPATIENT
Start: 2021-05-03 | End: 2022-06-28

## 2021-05-03 ASSESSMENT — MIFFLIN-ST. JEOR: SCORE: 1659.94

## 2021-05-03 NOTE — PATIENT INSTRUCTIONS
1. I will have the [facility] call you to do thge stress test.    2. I will send you all the test results.    3. Please go to the sleep clinic    4. You need to lose weight    5. For the abdominal discomfort get your weight down and see Dr. Burr    6. For the drainage try getting flonase and use that just at night    7. For the heartburn and throat discomfort start pantoprazole daily and I will have someone call you to do the upper gi endoscopy.  I will also have someone call you to do the colon exam on the same day.    8. Follow up with me in 6 weeks to see how you are doing    9. See the ent doctor for the nose issue    Alejandro Lewis M.D.

## 2021-05-03 NOTE — PROGRESS NOTES
SUBJECTIVE:   CC: Sarkis Darnell is an 60 year old male who presents for preventative health visit.     This is a very pleasant 60-year-old Fe is here for a physical.  However, he has many things to go over today which we did.    Since seen as an abdominal hernia repair he has had abdominal pressure in different spots.  No nausea or vomiting.  His bowels have been fine.  It comes and goes.    The patient has chest pressure.  It sounds like it is only exertional and its been 1 year.  With that he can also get short of breath.  Sporadically relieved when he stops.  No coughs.  No fevers or night sweats.  No history of cardiovascular disease or blood clots.    The patient does notice some acid reflux with things in the back of his throat.  Occasionally he feels food can stick.    He does note drowsiness.  He does not believe he snores or has sleep apnea.    He feels like there is stuff in the back of his throat at times.  He feels like it is draining.    He does have BPH and has regular urology follow-up for this and his elevated PSA.    He does exercise some but not a lot.  He is  and has 2 kids.  No other complaints on review of systems.      Patient has been advised of split billing requirements and indicates understanding: Yes  Healthy Habits:     Getting at least 3 servings of Calcium per day:  NO    Bi-annual eye exam:  NO    Dental care twice a year:  Yes    Sleep apnea or symptoms of sleep apnea:  Daytime drowsiness    Diet:  Other    Frequency of exercise:  2-3 days/week    Duration of exercise:  15-30 minutes    Taking medications regularly:  Yes    Medication side effects:  Other    PHQ-2 Total Score: 0    Additional concerns today:  Yes              Today's PHQ-2 Score:   PHQ-2 ( 1999 [employer]) 5/2/2021   Q1: Little interest or pleasure in doing things 0   Q2: Feeling down, depressed or hopeless 0   PHQ-2 Score 0   Q1: Little interest or pleasure in doing things Not at all   Q2: Feeling down, depressed  or hopeless Not at all   PHQ-2 Score 0       Abuse: Current or Past(Physical, Sexual or Emotional)-   Do you feel safe in your environment?     Have you ever done Advance Care Planning? (For example, a Health Directive, POLST, or a discussion with a medical provider or your loved ones about your wishes):     Social History     Tobacco Use     Smoking status: Never Smoker     Smokeless tobacco: Never Used   Substance Use Topics     Alcohol use: Never     Alcohol/week: 0.0 standard drinks     Frequency: Never     Binge frequency: Never     If you drink alcohol do you typically have >3 drinks per day or >7 drinks per week? Not applicable               Past Medical History:      Past Medical History:   Diagnosis Date     BPH (benign prostatic hyperplasia) 2016    Dr. Gonzalez     Chest pain 1/14    neg est echo     Elevated PSA 2016    Dr. Gonzalez, bx neg     Erectile dysfunction     Dr. Gonzalez     Gastritis      Hypercholesteremia      Hypothyroid 2007     Normal colonoscopy 2010     Vitamin D deficiency 2009    Vitamin D2/D3 of 19             Past Surgical History:      Past Surgical History:   Procedure Laterality Date     ABDOMEN SURGERY  1/8/2021    Incisional Hernia     BIOPSY  9/2015    Prostate. Later PSA results were found good.     COLONOSCOPY  2010    No issue found.     LAPAROSCOPIC CHOLECYSTECTOMY N/A 11/16/2015    Procedure: LAPAROSCOPIC CHOLECYSTECTOMY;  Surgeon: Sivakumar Burr MD;  [facility]     LAPAROSCOPIC HERNIORRHAPHY VENTRAL N/A 1/8/2021    Procedure: LAPAROSCOPIC VENTRAL HERNIA REPAIR WITH MESH;  Surgeon: Sivakumar Burr MD;  Location:  OR     PROSTATE SURGERY      Trus BX             Social History:     Social History     Socioeconomic History     Marital status:      Spouse name: Not on file     Number of children: 2     Years of education: Not on file     Highest education level: Not on file   Occupational History     Occupation:      Employer: [employer]      Employer: [employer]   Social Needs     Financial resource strain: Not on file     Food insecurity     Worry: Not on file     Inability: Not on file     Transportation needs     Medical: Not on file     Non-medical: Not on file   Tobacco Use     Smoking status: Never Smoker     Smokeless tobacco: Never Used   Substance and Sexual Activity     Alcohol use: Never     Alcohol/week: 0.0 standard drinks     Frequency: Never     Binge frequency: Never     Drug use: Never     Sexual activity: Yes     Partners: Female     Birth control/protection: Abstinence     Comment: New medicines have affected my general routine of life   Lifestyle     Physical activity     Days per week: Not on file     Minutes per session: Not on file     Stress: Not on file   Relationships     Social connections     Talks on phone: Not on file     Gets together: Not on file     Attends Quaker service: Not on file     Active member of club or organization: Not on file     Attends meetings of clubs or organizations: Not on file     Relationship status: Not on file     Intimate partner violence     Fear of current or ex partner: Not on file     Emotionally abused: Not on file     Physically abused: Not on file     Forced sexual activity: Not on file   Other Topics Concern     Parent/sibling w/ CABG, MI or angioplasty before 65F 55M? No   Social History Narrative     Not on file             Family History:   reviewed         Allergies:     Allergies   Allergen Reactions     Dust Mites      Sneezing, water eyes             Medications:     Current Outpatient Medications   Medication Sig Dispense Refill     finasteride (PROSCAR) 5 MG tablet Take 1 tablet (5 mg) by mouth daily 90 tablet 3     levothyroxine (SYNTHROID/LEVOTHROID) 88 MCG tablet Take 1 tablet (88 mcg) by mouth daily 30 tablet 0     pantoprazole (PROTONIX) 40 MG EC tablet Take 1 tablet (40 mg) by mouth daily 90 tablet 1     tamsulosin (FLOMAX) 0.4 MG capsule Take 1 capsule (0.4 mg) by  "mouth daily 30 minutes after evening meal 90 capsule 3               Review of Systems:   The 10 point Review of Systems is negative other than noted in the HPI           Physical Exam:   Blood pressure 118/76, pulse 80, height 1.676 m (5' 6\"), weight 90.7 kg (200 lb).    Exam:  Constitutional: healthy appearing, alert and in no distress  Heent: Normocephalic. Head without obvious masses or lesions. PERRLDC, EOMI. Mouth exam within normal limits: tongue, mucous membranes, posterior pharynx all normal, no lesions or abnormalities seen.  Tm's and canals within normal limits bilaterally. Neck supple, no nuchal rigidity or masses. No supraclavicular, or cervical adenopathy. Thyroid symmetric, no masses.  Cardiovascular: Regular rate and rhythm, no murmer, rub or gallops.  JVP not elevated, no edema.  Carotids within normal limits bilaterally, no bruits.  Respiratory: Normal respiratory effort.  Lungs clear, normal flow, no wheezing or crackles.  Breasts: Normal bilaterally.  No masses or lesions.  Nipples within normal limites.  No axillary lesions or nodes.  Gastrointestinal: Normal active bowel sounds.   Soft, not tender, no masses, guarding or rebound.  No hepatosplenomegaly.   Genitourinary: Rectal not done  Musculoskeletal: extremities normal, no gross deformities noted.  Skin: no suspicious lesions or rashes   Neurologic: Mental status within normal limits.  Speech fluent.  No gross motor abnormalities and gait intact.  Psychiatric: mentation appears normal and affect normal.         Data:   Labs sent; cxr to be done; ekg - nsr, within normal limits.        Assessment:   1. Normal complete physical exam  2. Chest pressure and shortness of breath, needs eval, not unstable but could be cad, doubt pulm, doubt pulmonary embolis, may be obesity  3. Drowsiness, suspect chavez, check labs and to sleep clinic  4. Abdomen pain, suspect in part weight, I rec weight loss and see surgery, doubt colon or tumot  5. Gerd, doubt " malig cause but needs eval  6. Throat sensation, suspect pnd and gerd  7. Elevated cholesterol  8. Benign prostatic hypertrophy, elevated psa due to this, has reg urol follow up  9. Hypothyroidism, low vit d, follow up labs  10. hcm         Plan:   Est echo  cxr  Letter with labs  Sleep eval  See surgery  protonix  flonase  Follow up 6 weeks or prn  Weight loss stressed      Alejandro Lewis M.D.

## 2021-05-03 NOTE — RESULT ENCOUNTER NOTE
Mr. Darnell,    It was a pleasure to see you for your physical.  You should be able to review all the test results.    Your CBC or complete blood count is normal with no signs of anemia or other blood abnormalities.  Your chemistry panel shows a very slightly elevated blood glucose or sugar.  Please be sure to exercise and try to get the weight down 10 pounds for this.  Your blood salts, kidney tests, liver tests, proteins, HIV test, and thyroid test are all normal.    Your total cholesterol is too high at 228.  Triglycerides are also too high which go along with the weight.  Your HDL or good cholesterol is okay at 39.  Your LDL or bad cholesterol is a bit high at 138.  As above, I would strongly recommend regular exercise and weight loss to improve this.    Your vitamin D level remains low.  I do not recall if you are taking vitamin D but I would strongly recommend that you do.  You should take vitamin D3, 2000 units every day to raise it.    Please be sure to get the stress test and let us see what that shows.  Please see surgery and also the sleep clinic.  Let me know if the Protonix and Flonase are not helping.  If you have any questions let me know.    Alejandro Lewis M.D.

## 2021-05-10 LAB
ALBUMIN SERPL-MCNC: 3.7 G/DL (ref 3.4–5)
ALP SERPL-CCNC: 95 U/L (ref 40–150)
ALT SERPL W P-5'-P-CCNC: 40 U/L (ref 0–70)
ANION GAP SERPL CALCULATED.3IONS-SCNC: 5 MMOL/L (ref 3–14)
AST SERPL W P-5'-P-CCNC: 20 U/L (ref 0–45)
BILIRUB SERPL-MCNC: 0.4 MG/DL (ref 0.2–1.3)
BUN SERPL-MCNC: 20 MG/DL (ref 7–30)
CALCIUM SERPL-MCNC: 8.9 MG/DL (ref 8.5–10.1)
CHLORIDE SERPL-SCNC: 106 MMOL/L (ref 94–109)
CHOLEST SERPL-MCNC: 228 MG/DL
CO2 SERPL-SCNC: 26 MMOL/L (ref 20–32)
CREAT SERPL-MCNC: 1.08 MG/DL (ref 0.66–1.25)
GFR SERPL CREATININE-BSD FRML MDRD: 73 ML/MIN/{1.73_M2}
GLUCOSE SERPL-MCNC: 108 MG/DL (ref 70–99)
HDLC SERPL-MCNC: 39 MG/DL
LDLC SERPL CALC-MCNC: 138 MG/DL
NONHDLC SERPL-MCNC: 189 MG/DL
POTASSIUM SERPL-SCNC: 4.1 MMOL/L (ref 3.4–5.3)
PROT SERPL-MCNC: 7.5 G/DL (ref 6.8–8.8)
SODIUM SERPL-SCNC: 137 MMOL/L (ref 133–144)
TRIGL SERPL-MCNC: 256 MG/DL

## 2021-05-17 ENCOUNTER — HOSPITAL ENCOUNTER (OUTPATIENT)
Dept: CARDIOLOGY | Facility: CLINIC | Age: 61
Discharge: HOME | End: 2021-05-17
Attending: INTERNAL MEDICINE | Admitting: INTERNAL MEDICINE
Payer: COMMERCIAL

## 2021-05-17 DIAGNOSIS — R07.89 CHEST PRESSURE: Primary | ICD-10-CM

## 2021-05-17 PROCEDURE — 93018 CV STRESS TEST I&R ONLY: CPT | Performed by: INTERNAL MEDICINE

## 2021-05-17 PROCEDURE — 93321 DOPPLER ECHO F-UP/LMTD STD: CPT | Mod: 26 | Performed by: INTERNAL MEDICINE

## 2021-05-17 PROCEDURE — 255N000002 HC RX 255 OP 636: Performed by: INTERNAL MEDICINE

## 2021-05-17 PROCEDURE — 93325 DOPPLER ECHO COLOR FLOW MAPG: CPT | Mod: TC

## 2021-05-17 PROCEDURE — 93325 DOPPLER ECHO COLOR FLOW MAPG: CPT | Mod: 26 | Performed by: INTERNAL MEDICINE

## 2021-05-17 PROCEDURE — 93016 CV STRESS TEST SUPVJ ONLY: CPT | Performed by: INTERNAL MEDICINE

## 2021-05-17 PROCEDURE — 93350 STRESS TTE ONLY: CPT | Mod: 26 | Performed by: INTERNAL MEDICINE

## 2021-05-17 RX ADMIN — HUMAN ALBUMIN MICROSPHERES AND PERFLUTREN 9 ML: 10; .22 INJECTION, SOLUTION INTRAVENOUS at 11:39

## 2021-05-19 ENCOUNTER — TELEPHONE (OUTPATIENT)
Dept: FAMILY MEDICINE | Facility: CLINIC | Age: 61
End: 2021-05-19

## 2021-05-19 DIAGNOSIS — R94.39 ABNORMAL STRESS TEST: Primary | ICD-10-CM

## 2021-06-03 DIAGNOSIS — E03.9 HYPOTHYROIDISM, UNSPECIFIED TYPE: ICD-10-CM

## 2021-06-04 RX ORDER — LEVOTHYROXINE SODIUM 88 UG/1
88 TABLET ORAL DAILY
Qty: 30 TABLET | Refills: 0 | Status: SHIPPED | OUTPATIENT
Start: 2021-06-04 | End: 2021-07-29

## 2021-06-18 ENCOUNTER — VIRTUAL VISIT (OUTPATIENT)
Dept: FAMILY MEDICINE | Facility: CLINIC | Age: 61
End: 2021-06-18
Payer: COMMERCIAL

## 2021-06-18 DIAGNOSIS — R94.39 ABNORMAL STRESS TEST: Primary | ICD-10-CM

## 2021-06-18 DIAGNOSIS — R07.89 CHEST PRESSURE: ICD-10-CM

## 2021-06-18 DIAGNOSIS — K21.9 GASTROESOPHAGEAL REFLUX DISEASE, UNSPECIFIED WHETHER ESOPHAGITIS PRESENT: ICD-10-CM

## 2021-06-18 PROCEDURE — 99213 OFFICE O/P EST LOW 20 MIN: CPT | Mod: 95 | Performed by: INTERNAL MEDICINE

## 2021-06-18 NOTE — PROGRESS NOTES
This is a video visit with the patient after his consent.  The call started at 252 and ended at 310.  The patient was on [employer]ximity.  I was that my house and he was at his house.    The patient has presumed coronary artery disease.  This is based on symptoms as noted during his physical that have been ongoing for a year.  Exertional chest discomfort.  Nothing at rest.  Minimal shortness of breath.  The EKG part of the stress test was positive, the echo did not show anything.  Since then he had absolutely no change in his symptoms.  I discussed with him the fact that he needs to call right away with any change in symptoms.  I recommended aspirin and a statin but he wants to wait until his upcoming cardiology appointment in July.    He is otherwise doing well.  At that appointment I started Protonix for presumed reflux.  He was also having a feeling of something in the back of his throat.  Being on the Protonix is no changes whatsoever.    He is also needs a routine colonoscopy.    Past Medical History:   Diagnosis Date     BPH (benign prostatic hyperplasia) 2016    Dr. Gonzalez     Chest pain 1/14    neg est echo     Elevated PSA 2016    Dr. Gonzalez, bx neg     Erectile dysfunction     Dr. Gonzalez     Gastritis      Hypercholesteremia      Hypothyroid 2007     Normal colonoscopy 2010     Vitamin D deficiency 2009    Vitamin D2/D3 of 19     Past Surgical History:   Procedure Laterality Date     ABDOMEN SURGERY  1/8/2021    Incisional Hernia     BIOPSY  9/2015    Prostate. Later PSA results were found good.     COLONOSCOPY  2010    No issue found.     LAPAROSCOPIC CHOLECYSTECTOMY N/A 11/16/2015    Procedure: LAPAROSCOPIC CHOLECYSTECTOMY;  Surgeon: Sivakumar Burr MD;  Location: [facility]     LAPAROSCOPIC HERNIORRHAPHY VENTRAL N/A 1/8/2021    Procedure: LAPAROSCOPIC VENTRAL HERNIA REPAIR WITH MESH;  Surgeon: Sivakumar Burr MD;  Location:  OR     PROSTATE SURGERY      Trus BX     Social History     Socioeconomic  History     Marital status:      Spouse name: Not on file     Number of children: 2     Years of education: Not on file     Highest education level: Not on file   Occupational History     Occupation:      Employer: [employer]     Employer: [employer]   Social Needs     Financial resource strain: Not on file     Food insecurity     Worry: Not on file     Inability: Not on file     Transportation needs     Medical: Not on file     Non-medical: Not on file   Tobacco Use     Smoking status: Never Smoker     Smokeless tobacco: Never Used   Substance and Sexual Activity     Alcohol use: Never     Alcohol/week: 0.0 standard drinks     Frequency: Never     Binge frequency: Never     Drug use: Never     Sexual activity: Yes     Partners: Female     Birth control/protection: Abstinence     Comment: New medicines have affected my general routine of life   Lifestyle     Physical activity     Days per week: Not on file     Minutes per session: Not on file     Stress: Not on file   Relationships     Social connections     Talks on phone: Not on file     Gets together: Not on file     Attends Restoration service: Not on file     Active member of club or organization: Not on file     Attends meetings of clubs or organizations: Not on file     Relationship status: Not on file     Intimate partner violence     Fear of current or ex partner: Not on file     Emotionally abused: Not on file     Physically abused: Not on file     Forced sexual activity: Not on file   Other Topics Concern     Parent/sibling w/ CABG, MI or angioplasty before 65F 55M? No   Social History Narrative     Not on file     Current Outpatient Medications   Medication Sig Dispense Refill     finasteride (PROSCAR) 5 MG tablet Take 1 tablet (5 mg) by mouth daily 90 tablet 3     levothyroxine (SYNTHROID/LEVOTHROID) 88 MCG tablet Take 1 tablet (88 mcg) by mouth daily 30 tablet 0     pantoprazole (PROTONIX) 40 MG EC tablet Take 1 tablet (40 mg) by  mouth daily 90 tablet 1     tamsulosin (FLOMAX) 0.4 MG capsule Take 1 capsule (0.4 mg) by mouth daily 30 minutes after evening meal 90 capsule 3     Allergies   Allergen Reactions     Dust Mites      Sneezing, water eyes     FAMILY HISTORY NOTED AND REVIEWED    REVIEW OF SYSTEMS: above    PHYSICAL EXAM    There were no vitals taken for this visit.    Patient appears non toxic  The patient is sitting in his chair with a normal respiratory rate and affect.  He appears quite well.  His eyes are normal.  His facial features are normal.  His speech is normal.    ASSESSMENT:  Presumed cad, not unstable.  He wants to wait until meeting with cardiology before adding further medication.  Therefore I will not add metoprolol, statin or aspirin.  He clearly knows that he needs to call right away if he develops increasing symptoms.    With respect to his throat symptoms and the reflux I would like him to see an ENT doctor and he will do this.  He will also get an upper endoscopy as well as a colonoscopy.    Alejandro Lewis M.D.]

## 2021-06-18 NOTE — PROGRESS NOTES
"Darrell is a 61 year old who is being evaluated via a billable video visit.      How would you like to obtain your AVS? {AVS Preference:682527}  If the video visit is dropped, the invitation should be resent by: {video visit invitation:074954}  Will anyone else be joining your video visit? {:808113}  {If patient encounters technical issues they should call [phone] :819796}    Video Start Time: {video visit start/end time for provider to select:291235}    {PROVIDER CHARTING PREFERENCE:781168}    Subjective   Darrell is a 61 year old who presents for the following health issues {ACCOMPANIED BY STATEMENT (Optional):191673}    HPI     {SUPERLIST (Optional):916571}  {additonal problems for provider to add (Optional):722645}    Review of Systems   {ROS COMP (Optional):099817}      Objective           Vitals:  No vitals were obtained today due to virtual visit.    Physical Exam   {video visit exam brief selected:024980::\"GENERAL: Healthy, alert and no distress\",\"EYES: Eyes grossly normal to inspection.  No discharge or erythema, or obvious scleral/conjunctival abnormalities.\",\"RESP: No audible wheeze, cough, or visible cyanosis.  No visible retractions or increased work of breathing.  \",\"SKIN: Visible skin clear. No significant rash, abnormal pigmentation or lesions.\",\"NEURO: Cranial nerves grossly intact.  Mentation and speech appropriate for age.\",\"PSYCH: Mentation appears normal, affect normal/bright, judgement and insight intact, normal speech and appearance well-groomed.\"}    {Diagnostic Test Results (Optional):171695}    {AMBULATORY ATTESTATION (Optional):964479}        Video-Visit Details    Type of service:  Video Visit    Video End Time:{video visit start/end time for provider to select:539421}    Originating Location (pt. Location): {video visit patient location:187228::\"Home\"}    Distant Location (provider location):  [facility]     Platform used for Video Visit: {Virtual Visit " "Platforms:728830::\"Kylah\"}  "

## 2021-06-23 ENCOUNTER — TELEPHONE (OUTPATIENT)
Dept: FAMILY MEDICINE | Facility: CLINIC | Age: 61
End: 2021-06-23

## 2021-06-23 NOTE — TELEPHONE ENCOUNTER
[facility] calling for order for upper and lower endoscopy. Caller states that the patient had contacted them to inform that Dr. Lewis had order. Verbal order given per referral information. Saida Murillo RN

## 2021-06-30 ENCOUNTER — TRANSFERRED RECORDS (OUTPATIENT)
Dept: HEALTH INFORMATION MANAGEMENT | Facility: CLINIC | Age: 61
End: 2021-06-30

## 2021-07-09 ENCOUNTER — PRE VISIT (OUTPATIENT)
Dept: CARDIOLOGY | Facility: CLINIC | Age: 61
End: 2021-07-09

## 2021-07-20 ENCOUNTER — OFFICE VISIT (OUTPATIENT)
Dept: CARDIOLOGY | Facility: CLINIC | Age: 61
End: 2021-07-20
Attending: INTERNAL MEDICINE
Payer: COMMERCIAL

## 2021-07-20 VITALS
WEIGHT: 199.5 LBS | OXYGEN SATURATION: 98 % | BODY MASS INDEX: 32.06 KG/M2 | DIASTOLIC BLOOD PRESSURE: 68 MMHG | HEART RATE: 80 BPM | HEIGHT: 66 IN | SYSTOLIC BLOOD PRESSURE: 104 MMHG

## 2021-07-20 DIAGNOSIS — E78.2 MIXED HYPERCHOLESTEROLEMIA AND HYPERTRIGLYCERIDEMIA: ICD-10-CM

## 2021-07-20 DIAGNOSIS — E03.9 HYPOTHYROIDISM, UNSPECIFIED TYPE: ICD-10-CM

## 2021-07-20 DIAGNOSIS — N52.9 ERECTILE DYSFUNCTION, UNSPECIFIED ERECTILE DYSFUNCTION TYPE: ICD-10-CM

## 2021-07-20 DIAGNOSIS — R60.0 PERIPHERAL EDEMA: ICD-10-CM

## 2021-07-20 DIAGNOSIS — R94.39 ABNORMAL CARDIOVASCULAR STRESS TEST: ICD-10-CM

## 2021-07-20 DIAGNOSIS — E66.811 CLASS 1 OBESITY DUE TO EXCESS CALORIES WITH SERIOUS COMORBIDITY AND BODY MASS INDEX (BMI) OF 32.0 TO 32.9 IN ADULT: ICD-10-CM

## 2021-07-20 DIAGNOSIS — E66.09 CLASS 1 OBESITY DUE TO EXCESS CALORIES WITH SERIOUS COMORBIDITY AND BODY MASS INDEX (BMI) OF 32.0 TO 32.9 IN ADULT: ICD-10-CM

## 2021-07-20 DIAGNOSIS — I25.118 CORONARY ARTERY DISEASE OF NATIVE ARTERY OF NATIVE HEART WITH STABLE ANGINA PECTORIS (H): Primary | ICD-10-CM

## 2021-07-20 PROCEDURE — 99214 OFFICE O/P EST MOD 30 MIN: CPT | Performed by: INTERNAL MEDICINE

## 2021-07-20 RX ORDER — METOPROLOL SUCCINATE 25 MG/1
25 TABLET, EXTENDED RELEASE ORAL DAILY
Qty: 90 TABLET | Refills: 3 | Status: SHIPPED | OUTPATIENT
Start: 2021-07-20 | End: 2022-09-07

## 2021-07-20 RX ORDER — ROSUVASTATIN CALCIUM 40 MG/1
40 TABLET, COATED ORAL DAILY
Qty: 90 TABLET | Refills: 3 | Status: SHIPPED | OUTPATIENT
Start: 2021-07-20 | End: 2022-09-07

## 2021-07-20 RX ORDER — FAMOTIDINE 20 MG/1
TABLET, FILM COATED ORAL
COMMUNITY
Start: 2021-06-30

## 2021-07-20 ASSESSMENT — MIFFLIN-ST. JEOR: SCORE: 1652.68

## 2021-07-20 NOTE — LETTER
2021    Alejandro Lewis MD  [address] [facility] [address]  [facility] [address]    RE: Sarkis Darnell       Dear Colleague,    I had the pleasure of seeing Sarkis Darnell in the [facility].    HPI and Plan:   See dictation    Orders Placed This Encounter   Procedures     Lipid Profile     ALT     Follow-Up with Cardiac Advanced Practice Provider     Follow-Up with Cardiologist       Orders Placed This Encounter   Medications     famotidine (PEPCID) 20 MG tablet     Si TABLET 30 MINUTES BEFORE BEDTIME. ONCE A DAY ORALLY FOR 90 DAYS     aspirin (ASA) 81 MG EC tablet     Sig: Take 1 tablet (81 mg) by mouth daily     metoprolol succinate ER (TOPROL-XL) 25 MG 24 hr tablet     Sig: Take 1 tablet (25 mg) by mouth daily     Dispense:  90 tablet     Refill:  3     rosuvastatin (CRESTOR) 40 MG tablet     Sig: Take 1 tablet (40 mg) by mouth daily     Dispense:  90 tablet     Refill:  3       There are no discontinued medications.      Encounter Diagnoses   Name Primary?     Coronary artery disease of native artery of native heart with stable angina pectoris (H) Yes     Abnormal cardiovascular stress test      Mixed hypercholesterolemia and hypertriglyceridemia      Class 1 obesity due to excess calories with serious comorbidity and body mass index (BMI) of 32.0 to 32.9 in adult      Erectile dysfunction, unspecified erectile dysfunction type      Hypothyroidism, unspecified type      Peripheral edema        CURRENT MEDICATIONS:  Current Outpatient Medications   Medication Sig Dispense Refill     aspirin (ASA) 81 MG EC tablet Take 1 tablet (81 mg) by mouth daily       famotidine (PEPCID) 20 MG tablet 1 TABLET 30 MINUTES BEFORE BEDTIME. ONCE A DAY ORALLY FOR 90 DAYS       finasteride (PROSCAR) 5 MG tablet Take 1 tablet (5 mg) by mouth daily 90 tablet 3     levothyroxine (SYNTHROID/LEVOTHROID) 88 MCG tablet Take 1 tablet (88 mcg) by mouth daily 30 tablet 0     metoprolol  succinate ER (TOPROL-XL) 25 MG 24 hr tablet Take 1 tablet (25 mg) by mouth daily 90 tablet 3     pantoprazole (PROTONIX) 40 MG EC tablet Take 1 tablet (40 mg) by mouth daily 90 tablet 1     rosuvastatin (CRESTOR) 40 MG tablet Take 1 tablet (40 mg) by mouth daily 90 tablet 3     tamsulosin (FLOMAX) 0.4 MG capsule Take 1 capsule (0.4 mg) by mouth daily 30 minutes after evening meal 90 capsule 3       ALLERGIES     Allergies   Allergen Reactions     Dust Mites      Sneezing, water eyes       PAST MEDICAL HISTORY:  Past Medical History:   Diagnosis Date     BPH (benign prostatic hyperplasia) 2016    Dr. Gonzalez     Chest pain 1/14    neg est echo     Coronary artery disease of native artery of native heart with stable angina pectoris (H) 7/20/2021     Elevated PSA 2016    Dr. Gonzalez, bx neg     Erectile dysfunction     Dr. Gonzalez     Gastritis      Hypercholesteremia      Hypothyroid 2007     Normal colonoscopy 2010     Vitamin D deficiency 2009    Vitamin D2/D3 of 19       PAST SURGICAL HISTORY:  Past Surgical History:   Procedure Laterality Date     ABDOMEN SURGERY  1/8/2021    Incisional Hernia     BIOPSY  9/2015    Prostate. Later PSA results were found good.     COLONOSCOPY  2010    No issue found.     LAPAROSCOPIC CHOLECYSTECTOMY N/A 11/16/2015    Procedure: LAPAROSCOPIC CHOLECYSTECTOMY;  Surgeon: Sivakumar Burr MD;  [facility]:  [facility]     LAPAROSCOPIC HERNIORRHAPHY VENTRAL N/A 1/8/2021    Procedure: LAPAROSCOPIC VENTRAL HERNIA REPAIR WITH MESH;  Surgeon: Sivakumar Burr MD;  Location:  OR     PROSTATE SURGERY      Trus BX       FAMILY HISTORY:  Family History   Problem Relation Age of Onset     C.A.D. Mother      Arthritis Mother      Cancer Mother         ovarian     Coronary Artery Disease Mother         Angina, and Congestive heart failure     Other Cancer Mother         Ovarian     Osteoporosis Mother         After 70 years of age     Thyroid Disease Mother         Found low when 70+     Coronary  Artery Disease Father         Angina     Hyperlipidemia Brother      Hypertension Brother      No Known Problems Sister      Cancer Maternal Grandfather         esophageal cancer     Other Cancer Maternal Grandfather         Esophagus     Cancer Paternal Grandmother         liver cancer     Other Cancer Paternal Grandmother         Liver     Cancer Paternal Grandfather         throat cancer     Other Cancer Paternal Grandfather         Throat     Hypertension Brother      Thyroid Disease Brother         Found low when 40+       SOCIAL HISTORY:  Social History     Socioeconomic History     Marital status:      Spouse name: None     Number of children: 2     Years of education: None     Highest education level: None   Occupational History     Occupation:      Employer: [employer]     Employer: [employer]   Tobacco Use     Smoking status: Never Smoker     Smokeless tobacco: Never Used   Substance and Sexual Activity     Alcohol use: Never     Alcohol/week: 0.0 standard drinks     Drug use: Never     Sexual activity: Yes     Partners: Female     Birth control/protection: Abstinence     Comment: New medicines have affected my general routine of life   Other Topics Concern     Parent/sibling w/ CABG, MI or angioplasty before 65F 55M? No   Social History Narrative     None     Social Determinants of Health     Financial Resource Strain:      Difficulty of Paying Living Expenses:    Food Insecurity:      Worried About Running Out of Food in the Last Year:      Ran Out of Food in the Last Year:    Transportation Needs:      Lack of Transportation (Medical):      Lack of Transportation (Non-Medical):    Physical Activity:      Days of Exercise per Week:      Minutes of Exercise per Session:    Stress:      Feeling of Stress :    Social Connections:      Frequency of Communication with Friends and Family:      Frequency of Social Gatherings with Friends and Family:      Attends Yazdanism Services:       "Active Member of Clubs or Organizations:      Attends Club or Organization Meetings:      Marital Status:    Intimate Partner Violence:      Fear of Current or Ex-Partner:      Emotionally Abused:      Physically Abused:      Sexually Abused:        Review of Systems:  Skin:  Positive for   left middle finger pain upon touching the skin   Eyes:  Positive for glasses    ENT:  Positive for hearing loss right ear  Respiratory:  Positive for dyspnea on exertion sleep study doctor consult scheduled   Cardiovascular:    Positive for;chest pain;edema with activity  Gastroenterology: Positive for heartburn treated  Genitourinary:  Positive for urgency;nocturia    Musculoskeletal:  Negative      Neurologic:  Negative      Psychiatric:  Negative      Heme/Lymph/Imm:  Positive for   seasonal  Endocrine:  Positive for thyroid disorder      Physical Exam:  Vitals: /68   Pulse 80   Ht 1.676 m (5' 6\")   Wt 90.5 kg (199 lb 8 oz)   SpO2 98%   BMI 32.20 kg/m      Constitutional:  cooperative, alert and oriented, well developed, well nourished, in no acute distress obese      Skin:  warm and dry to the touch, no apparent skin lesions or masses noted          Head:  normocephalic, no masses or lesions        Eyes:  pupils equal and round, conjunctivae and lids unremarkable, sclera white, no xanthalasma, EOMS intact, no nystagmus        Lymph:      ENT:  no pallor or cyanosis, dentition good        Neck:  carotid pulses are full and equal bilaterally;no carotid bruit        Respiratory:  normal breath sounds, clear to auscultation, normal A-P diameter, normal symmetry, normal respiratory excursion, no use of accessory muscles         Cardiac: regular rhythm;no murmurs, gallops or rubs detected                pulses full and equal                                        GI:    obese      Extremities and Muscular Skeletal:  no edema;no spinal abnormalities noted;normal muscle strength and tone              Neurological:  no " gross motor deficits        Psych:  affect appropriate, oriented to time, person and place        CC  Alejandro Lewis MD  [address]  [place] [address]                  Thank you for allowing me to participate in the care of your patient.      Sincerely,     Jude Pérez MD     [facility]  cc:   Alejandro Lewis MD  [address]  [place] [address]

## 2021-07-20 NOTE — PROGRESS NOTES
Service Date: 07/20/2021    Mr. Darnell is a 61-year-old gentleman who I am seeing in the clinic for the first time.  I take care of his father and his brother-in-law.  He is a referral from Dr. eLwis.    Renny has a past medical history significant for mixed hyperlipidemia with elevated triglycerides and low HDL, hypothyroidism, GERD, erectile dysfunction, benign prostatic hypertrophy.  He is a lifelong nonsmoker, does not have diabetes mellitus.  He does have central obesity.    He has a history of substernal chest discomfort that radiates to his left shoulder that occurs when walking and mowing the lawn.  It occurs only at higher levels of activity.  If he stops and rests, it goes away.  He thinks the longest episode lasted 5 minutes.  He has had no prolonged episodes.  He thinks he first noted it about a year ago.  He thinks over the last 6 months it may have progressed somewhat.      Because of this, he was referred for stress test for which he was able to exercise 7 minutes of the standard Ramon protocol.  He did develop 1.5 mm of ST segment depression in the inferolateral leads.  The echo portion did appear to be normal.  Dr. Patel had recommended starting him on medications including cholesterol medications, but Renny preferred to wait until he saw me.    As stated, Renny has anginally high workloads.  He has no dyspnea on exertion, orthopnea or PND.  No palpitations, lightheadedness, dizziness, syncope, near syncope.    ASSESSMENT AND PLAN:  Renny has what appears to be chronic, stable exertional angina.  For the most part, it appears to be well controlled.  We talked about the fact that this means he has a blockage of at least 70%.  I reviewed the stress echo.  It it is not clear any definite wall motion abnormality, so I think at this time options include aggressive medical management, risk factor modification versus going to the Cath Lab for possible intervention. At this time Renny refers a more  conservative approach. To this end, I will start him on a baby dose aspirin 1 pill once a day and start him on metoprolol 25 mg.  He is quite concerned about starting medications about possible side effects.  We talked about the potential for fatigue, tiredness.  However, we talked about the cardioprotective benefits of both aspirin and the beta blocker and he is willing to try metoprolol succinate 25 mg once a day.    We also talked about fasting lipid profile. Reviewed the various literature to discuss the LDL hypothesis, and at this time, I have told him previous that his LDL in the high 130s to 140s was borderline for Elia Arreola, but now that he has known coronary artery disease we want an LDL less than 70.  Triglycerides most recently is 256 with normal TSH. HDL is 39.  We talked about metabolic syndrome, HDL deficiency and I think he does have metabolic syndrome.  Glucose has intermittently been high, but he states his most recent one was nonfasting so at 108.  He may have a bit of glucose intolerance.  I told him our goal is at least an LDL of less than 70.  We will have him check back in 1 month with a fasting lipid profile and ALT and have him see my ABDIAZIZ.    He has a planned trip to [facility] in August and I told him if his symptoms have not changed and they are likely to be better on his medical regimen it is okay to go as long as he pays attention to his symptoms.  Obviously, if it progresses, we need to go right to the Cath Lab for further evaluation and treatment.    We talked about the importance of a regular exercise regimen, predominantly plant-based diet and weight loss.    He does intermittently have peripheral edema but none today.  We talked about this most likely being dependent edema from inactivity.    Further evaluation and treatment will depend upon the above results.  At this time, I will plan on seeing him back in 6 months.  If he should have any problems, I would be glad to see him  sooner.    Thank you for allowing me to participate in his care.    Jude Pérez MD, [facility]        D: 2021   T: 2021   MT: stefanie    Name:     EARL CHRISTIANSON  MRN:      [id number]        Account:      [id number]   :      1960           Service Date: 2021       Document: [id number]

## 2021-07-20 NOTE — PROGRESS NOTES
HPI and Plan:   See dictation    Orders Placed This Encounter   Procedures     Lipid Profile     ALT     Follow-Up with Cardiac Advanced Practice Provider     Follow-Up with Cardiologist       Orders Placed This Encounter   Medications     famotidine (PEPCID) 20 MG tablet     Si TABLET 30 MINUTES BEFORE BEDTIME. ONCE A DAY ORALLY FOR 90 DAYS     aspirin (ASA) 81 MG EC tablet     Sig: Take 1 tablet (81 mg) by mouth daily     metoprolol succinate ER (TOPROL-XL) 25 MG 24 hr tablet     Sig: Take 1 tablet (25 mg) by mouth daily     Dispense:  90 tablet     Refill:  3     rosuvastatin (CRESTOR) 40 MG tablet     Sig: Take 1 tablet (40 mg) by mouth daily     Dispense:  90 tablet     Refill:  3       There are no discontinued medications.      Encounter Diagnoses   Name Primary?     Coronary artery disease of native artery of native heart with stable angina pectoris (H) Yes     Abnormal cardiovascular stress test      Mixed hypercholesterolemia and hypertriglyceridemia      Class 1 obesity due to excess calories with serious comorbidity and body mass index (BMI) of 32.0 to 32.9 in adult      Erectile dysfunction, unspecified erectile dysfunction type      Hypothyroidism, unspecified type      Peripheral edema        CURRENT MEDICATIONS:  Current Outpatient Medications   Medication Sig Dispense Refill     aspirin (ASA) 81 MG EC tablet Take 1 tablet (81 mg) by mouth daily       famotidine (PEPCID) 20 MG tablet 1 TABLET 30 MINUTES BEFORE BEDTIME. ONCE A DAY ORALLY FOR 90 DAYS       finasteride (PROSCAR) 5 MG tablet Take 1 tablet (5 mg) by mouth daily 90 tablet 3     levothyroxine (SYNTHROID/LEVOTHROID) 88 MCG tablet Take 1 tablet (88 mcg) by mouth daily 30 tablet 0     metoprolol succinate ER (TOPROL-XL) 25 MG 24 hr tablet Take 1 tablet (25 mg) by mouth daily 90 tablet 3     pantoprazole (PROTONIX) 40 MG EC tablet Take 1 tablet (40 mg) by mouth daily 90 tablet 1     rosuvastatin (CRESTOR) 40 MG tablet Take 1 tablet (40 mg)  by mouth daily 90 tablet 3     tamsulosin (FLOMAX) 0.4 MG capsule Take 1 capsule (0.4 mg) by mouth daily 30 minutes after evening meal 90 capsule 3       ALLERGIES     Allergies   Allergen Reactions     Dust Mites      Sneezing, water eyes       PAST MEDICAL HISTORY:  Past Medical History:   Diagnosis Date     BPH (benign prostatic hyperplasia) 2016    Dr. Gonzalez     Chest pain 1/14    neg est echo     Coronary artery disease of native artery of native heart with stable angina pectoris (H) 7/20/2021     Elevated PSA 2016    Dr. Gonzalez, bx neg     Erectile dysfunction     Dr. Gonzalez     Gastritis      Hypercholesteremia      Hypothyroid 2007     Normal colonoscopy 2010     Vitamin D deficiency 2009    Vitamin D2/D3 of 19       PAST SURGICAL HISTORY:  Past Surgical History:   Procedure Laterality Date     ABDOMEN SURGERY  1/8/2021    Incisional Hernia     BIOPSY  9/2015    Prostate. Later PSA results were found good.     COLONOSCOPY  2010    No issue found.     LAPAROSCOPIC CHOLECYSTECTOMY N/A 11/16/2015    Procedure: LAPAROSCOPIC CHOLECYSTECTOMY;  Surgeon: Sivakumar Burr MD;  Location:  [facility]     LAPAROSCOPIC HERNIORRHAPHY VENTRAL N/A 1/8/2021    Procedure: LAPAROSCOPIC VENTRAL HERNIA REPAIR WITH MESH;  Surgeon: Sivakumar Burr MD;  Location: [facility]     PROSTATE SURGERY      Trus BX       FAMILY HISTORY:  Family History   Problem Relation Age of Onset     C.A.D. Mother      Arthritis Mother      Cancer Mother         ovarian     Coronary Artery Disease Mother         Angina, and Congestive heart failure     Other Cancer Mother         Ovarian     Osteoporosis Mother         After 70 years of age     Thyroid Disease Mother         Found low when 70+     Coronary Artery Disease Father         Angina     Hyperlipidemia Brother      Hypertension Brother      No Known Problems Sister      Cancer Maternal Grandfather         esophageal cancer     Other Cancer Maternal Grandfather         Esophagus     Cancer  Paternal Grandmother         liver cancer     Other Cancer Paternal Grandmother         Liver     Cancer Paternal Grandfather         throat cancer     Other Cancer Paternal Grandfather         Throat     Hypertension Brother      Thyroid Disease Brother         Found low when 40+       SOCIAL HISTORY:  Social History     Socioeconomic History     Marital status:      Spouse name: None     Number of children: 2     Years of education: None     Highest education level: None   Occupational History     Occupation:      Employer: [employer]     Employer: [employer]   Tobacco Use     Smoking status: Never Smoker     Smokeless tobacco: Never Used   Substance and Sexual Activity     Alcohol use: Never     Alcohol/week: 0.0 standard drinks     Drug use: Never     Sexual activity: Yes     Partners: Female     Birth control/protection: Abstinence     Comment: New medicines have affected my general routine of life   Other Topics Concern     Parent/sibling w/ CABG, MI or angioplasty before 65F 55M? No   Social History Narrative     None     Social Determinants of Health     Financial Resource Strain:      Difficulty of Paying Living Expenses:    Food Insecurity:      Worried About Running Out of Food in the Last Year:      Ran Out of Food in the Last Year:    Transportation Needs:      Lack of Transportation (Medical):      Lack of Transportation (Non-Medical):    Physical Activity:      Days of Exercise per Week:      Minutes of Exercise per Session:    Stress:      Feeling of Stress :    Social Connections:      Frequency of Communication with Friends and Family:      Frequency of Social Gatherings with Friends and Family:      Attends Yazdanism Services:      Active Member of Clubs or Organizations:      Attends Club or Organization Meetings:      Marital Status:    Intimate Partner Violence:      Fear of Current or Ex-Partner:      Emotionally Abused:      Physically Abused:      Sexually Abused:   "      Review of Systems:  Skin:  Positive for   left middle finger pain upon touching the skin   Eyes:  Positive for glasses    ENT:  Positive for hearing loss right ear  Respiratory:  Positive for dyspnea on exertion sleep study doctor consult scheduled   Cardiovascular:    Positive for;chest pain;edema with activity  Gastroenterology: Positive for heartburn treated  Genitourinary:  Positive for urgency;nocturia    Musculoskeletal:  Negative      Neurologic:  Negative      Psychiatric:  Negative      Heme/Lymph/Imm:  Positive for   seasonal  Endocrine:  Positive for thyroid disorder      Physical Exam:  Vitals: /68   Pulse 80   Ht 1.676 m (5' 6\")   Wt 90.5 kg (199 lb 8 oz)   SpO2 98%   BMI 32.20 kg/m      Constitutional:  cooperative, alert and oriented, well developed, well nourished, in no acute distress obese      Skin:  warm and dry to the touch, no apparent skin lesions or masses noted          Head:  normocephalic, no masses or lesions        Eyes:  pupils equal and round, conjunctivae and lids unremarkable, sclera white, no xanthalasma, EOMS intact, no nystagmus        Lymph:      ENT:  no pallor or cyanosis, dentition good        Neck:  carotid pulses are full and equal bilaterally;no carotid bruit        Respiratory:  normal breath sounds, clear to auscultation, normal A-P diameter, normal symmetry, normal respiratory excursion, no use of accessory muscles         Cardiac: regular rhythm;no murmurs, gallops or rubs detected                pulses full and equal                                        GI:    obese      Extremities and Muscular Skeletal:  no edema;no spinal abnormalities noted;normal muscle strength and tone              Neurological:  no gross motor deficits        Psych:  affect appropriate, oriented to time, person and place          Alejandro Lewis MD  [address] [facility] [address]  [place] [address]              "

## 2021-07-28 ENCOUNTER — TELEPHONE (OUTPATIENT)
Dept: CARDIOLOGY | Facility: CLINIC | Age: 61
End: 2021-07-28

## 2021-08-11 ENCOUNTER — TELEPHONE (OUTPATIENT)
Dept: CARDIOLOGY | Facility: CLINIC | Age: 61
End: 2021-08-11

## 2021-08-11 NOTE — TELEPHONE ENCOUNTER
Call from patient, he states he had a voice mail on his phone requesting that he call and add a blood sugar lab to his appointment on 8/18/2021. Reviewed with patient that there are no orders or messages in the chart regarding any other labs other than an flp/alt in August.  Patient will discuss his labs and how he is feeling with ABDIAZIZ Jc Green on 8/20/2021.  Reviewed with patient that his ABDIAZIZ visit is to help manage his cholesterol but is also to assess how he is doing and that ABDIAZIZ Jc Green can move up his 6 month visit with Dr. Pérez if this is needed. Patient states he thought he would always see Dr. Pérez so was a bit confused. Patient verbalized understanding and agreed with plan.

## 2021-08-12 ENCOUNTER — TRANSFERRED RECORDS (OUTPATIENT)
Dept: HEALTH INFORMATION MANAGEMENT | Facility: CLINIC | Age: 61
End: 2021-08-12

## 2021-08-17 ENCOUNTER — MYC MEDICAL ADVICE (OUTPATIENT)
Dept: CARDIOLOGY | Facility: CLINIC | Age: 61
End: 2021-08-17

## 2021-08-17 NOTE — TELEPHONE ENCOUNTER
My chart message from patient:  Good Morning,     I have a lab tomorrow at 8:15 am ordered by your office. Can you please let me know if any of the tests require fasting, or any other special instructions need to be followed in preparation?     Thanks,   Darrell       My chart reply to patient:    Good morning, Mr. Darnell,    Yes, please come to the lab appointment tomorrow in a fasting state. We are drawing a lipid panel. No other special requirements are needed.    Please remember to wear a mask.    Thank you for checking.  Team 2 RNs  [phone]

## 2021-08-18 ENCOUNTER — LAB (OUTPATIENT)
Dept: LAB | Facility: CLINIC | Age: 61
End: 2021-08-18
Payer: COMMERCIAL

## 2021-08-18 DIAGNOSIS — I25.118 CORONARY ARTERY DISEASE OF NATIVE ARTERY OF NATIVE HEART WITH STABLE ANGINA PECTORIS (H): ICD-10-CM

## 2021-08-18 DIAGNOSIS — E61.1 IRON DEFICIENCY: ICD-10-CM

## 2021-08-18 LAB
ALT SERPL W P-5'-P-CCNC: 29 U/L (ref 0–70)
CHOLEST SERPL-MCNC: 132 MG/DL
FASTING STATUS PATIENT QL REPORTED: YES
FERRITIN SERPL-MCNC: 175 NG/ML (ref 26–388)
HDLC SERPL-MCNC: 45 MG/DL
LDLC SERPL CALC-MCNC: 57 MG/DL
NONHDLC SERPL-MCNC: 87 MG/DL
TRIGL SERPL-MCNC: 151 MG/DL

## 2021-08-18 PROCEDURE — 84460 ALANINE AMINO (ALT) (SGPT): CPT

## 2021-08-18 PROCEDURE — 82728 ASSAY OF FERRITIN: CPT

## 2021-08-18 PROCEDURE — 80061 LIPID PANEL: CPT

## 2021-08-18 PROCEDURE — 36415 COLL VENOUS BLD VENIPUNCTURE: CPT

## 2021-08-20 ENCOUNTER — OFFICE VISIT (OUTPATIENT)
Dept: CARDIOLOGY | Facility: CLINIC | Age: 61
End: 2021-08-20
Attending: INTERNAL MEDICINE
Payer: COMMERCIAL

## 2021-08-20 VITALS
WEIGHT: 198 LBS | DIASTOLIC BLOOD PRESSURE: 71 MMHG | BODY MASS INDEX: 31.82 KG/M2 | HEIGHT: 66 IN | HEART RATE: 69 BPM | SYSTOLIC BLOOD PRESSURE: 131 MMHG

## 2021-08-20 DIAGNOSIS — I25.118 CORONARY ARTERY DISEASE OF NATIVE ARTERY OF NATIVE HEART WITH STABLE ANGINA PECTORIS (H): ICD-10-CM

## 2021-08-20 PROCEDURE — 99214 OFFICE O/P EST MOD 30 MIN: CPT | Performed by: PHYSICIAN ASSISTANT

## 2021-08-20 ASSESSMENT — MIFFLIN-ST. JEOR: SCORE: 1645.87

## 2021-08-20 NOTE — PROGRESS NOTES
Primary Cardiologist: Dr. Pérez    Reason for Visit: One month follow up    History of Present Illness:   Darrell is a pleasant 61 year old male with past medical history notable for hyperlipidemia, metabolic syndrome, GERD, hypothyroidism, ED, and BPD.     He was recently referred to Dr. Pérez for reports of exertional chest pain. His stress echo showed no WMA during stress but 1.5 mm ST depressions in the inferolateral leads. Cath versus conservative management was discussed and patient elected to proceed conservatively. He was started on aspirin and metoprolol and recommended to continue with statin therapy. He returns for follow up.    Darrell returns to clinic stating he is doing well. He recently returned from a road trip to [facility]. He is tolerating his medications. His lipid panel shows significant improvement in his LDL. In the interim he saw GI team and underwent endoscopy and colonoscopy. It sounds like he had some patches in his stomach. He is being tested for H pylori. Colonoscopy looked ok but they took polyp for biopsy.     Assessment and Plan:  Darrell is a pleasant 61 year old male with past medical history notable for hyperlipidemia, metabolic syndrome, GERD, hypothyroidism, ED, and BPD.     He is doing well from cardiac stanpoint. He will resume his exercise gradually. He will return in 1/2022. If he has worsening chest pain in the interim, he will contact our clinic.     This note was completed in part using Dragon voice recognition software. Although reviewed after completion, some word and grammatical errors may occur.    Orders this Visit:  No orders of the defined types were placed in this encounter.    Orders Placed This Encounter   Medications     melatonin 1 MG CAPS     There are no discontinued medications.      Encounter Diagnosis   Name Primary?     Coronary artery disease of native artery of native heart with stable angina pectoris (H)        CURRENT MEDICATIONS:  Current  Outpatient Medications   Medication Sig Dispense Refill     aspirin (ASA) 81 MG EC tablet Take 1 tablet (81 mg) by mouth daily       famotidine (PEPCID) 20 MG tablet 1 TABLET 30 MINUTES BEFORE BEDTIME. ONCE A DAY ORALLY FOR 90 DAYS       finasteride (PROSCAR) 5 MG tablet Take 1 tablet (5 mg) by mouth daily 90 tablet 3     levothyroxine (SYNTHROID/LEVOTHROID) 88 MCG tablet TAKE 1 TABLET(88 MCG) BY MOUTH DAILY 90 tablet 2     melatonin 1 MG CAPS        metoprolol succinate ER (TOPROL-XL) 25 MG 24 hr tablet Take 1 tablet (25 mg) by mouth daily 90 tablet 3     pantoprazole (PROTONIX) 40 MG EC tablet Take 1 tablet (40 mg) by mouth daily (Patient taking differently: Take 20 mg by mouth daily ) 90 tablet 1     rosuvastatin (CRESTOR) 40 MG tablet Take 1 tablet (40 mg) by mouth daily 90 tablet 3     tamsulosin (FLOMAX) 0.4 MG capsule Take 1 capsule (0.4 mg) by mouth daily 30 minutes after evening meal 90 capsule 3       ALLERGIES     Allergies   Allergen Reactions     Dust Mites      Sneezing, water eyes       PAST MEDICAL HISTORY:  Past Medical History:   Diagnosis Date     BPH (benign prostatic hyperplasia) 2016    Dr. Gonzalez     Chest pain 1/14    neg est echo     Coronary artery disease of native artery of native heart with stable angina pectoris (H) 7/20/2021     Elevated PSA 2016    Dr. Gonzalez, bx neg     Erectile dysfunction     Dr. Gonzalez     Gastritis      Hypercholesteremia      Hypothyroid 2007     Normal colonoscopy 2010     Vitamin D deficiency 2009    Vitamin D2/D3 of 19       PAST SURGICAL HISTORY:  Past Surgical History:   Procedure Laterality Date     ABDOMEN SURGERY  1/8/2021    Incisional Hernia     BIOPSY  9/2015    Prostate. Later PSA results were found good.     COLONOSCOPY  2010    No issue found.     LAPAROSCOPIC CHOLECYSTECTOMY N/A 11/16/2015    Procedure: LAPAROSCOPIC CHOLECYSTECTOMY;  Surgeon: Sivakumar Burr MD;  Location: [facility]     LAPAROSCOPIC HERNIORRHAPHY VENTRAL N/A 1/8/2021     Procedure: LAPAROSCOPIC VENTRAL HERNIA REPAIR WITH MESH;  Surgeon: Sivakumar Burr MD;  Location: [facility]     PROSTATE SURGERY      Trus BX       FAMILY HISTORY:  Family History   Problem Relation Age of Onset     C.A.D. Mother      Arthritis Mother      Cancer Mother         ovarian     Coronary Artery Disease Mother         Angina, and Congestive heart failure     Other Cancer Mother         Ovarian     Osteoporosis Mother         After 70 years of age     Thyroid Disease Mother         Found low when 70+     Coronary Artery Disease Father         Angina     Hyperlipidemia Brother      Hypertension Brother      No Known Problems Sister      Cancer Maternal Grandfather         esophageal cancer     Other Cancer Maternal Grandfather         Esophagus     Cancer Paternal Grandmother         liver cancer     Other Cancer Paternal Grandmother         Liver     Cancer Paternal Grandfather         throat cancer     Other Cancer Paternal Grandfather         Throat     Hypertension Brother      Thyroid Disease Brother         Found low when 40+       SOCIAL HISTORY:  Social History     Socioeconomic History     Marital status:      Spouse name: None     Number of children: 2     Years of education: None     Highest education level: None   Occupational History     Occupation: quality engineer     Employer: [employer]     Employer: [employer]   Tobacco Use     Smoking status: Never Smoker     Smokeless tobacco: Never Used   Substance and Sexual Activity     Alcohol use: Never     Alcohol/week: 0.0 standard drinks     Drug use: Never     Sexual activity: Yes     Partners: Female     Birth control/protection: Abstinence     Comment: New medicines have affected my general routine of life   Other Topics Concern     Parent/sibling w/ CABG, MI or angioplasty before 65F 55M? No   Social History Narrative     None     Social Determinants of Health     Financial Resource Strain:      Difficulty of Paying Living Expenses:   "  Food Insecurity:      Worried About Running Out of Food in the Last Year:      Ran Out of Food in the Last Year:    Transportation Needs:      Lack of Transportation (Medical):      Lack of Transportation (Non-Medical):    Physical Activity:      Days of Exercise per Week:      Minutes of Exercise per Session:    Stress:      Feeling of Stress :    Social Connections:      Frequency of Communication with Friends and Family:      Frequency of Social Gatherings with Friends and Family:      Attends Muslim Services:      Active Member of Clubs or Organizations:      Attends Club or Organization Meetings:      Marital Status:    Intimate Partner Violence:      Fear of Current or Ex-Partner:      Emotionally Abused:      Physically Abused:      Sexually Abused:        Review of Systems:  Skin:  Negative     Eyes:  Negative    ENT:  Negative    Respiratory:  Positive for dyspnea on exertion  Cardiovascular:  Negative    Gastroenterology: Negative    Genitourinary:  Negative    Musculoskeletal:  Negative    Neurologic:  Negative    Psychiatric:  Negative    Heme/Lymph/Imm:  Negative    Endocrine:  Positive for thyroid disorder    Physical Exam:  Vitals: /71   Pulse 69   Ht 1.676 m (5' 6\")   Wt 89.8 kg (198 lb)   BMI 31.96 kg/m       GEN:  NAD  NECK: No JVD  C/V:  Regular rate and rhythm, no murmur, rub or gallop.  RESP: Clear to auscultation bilaterally without wheezing, rales, or rhonchi.  GI: Abdomen soft, nontender, nondistended.   EXTREM: No pitting LE edema.   NEURO: Alert and oriented, cooperative. No obvious focal deficits.   PSYCH: Normal affect.  SKIN: Warm and dry.       Recent Lab Results:  LIPID RESULTS:  Lab Results   Component Value Date    CHOL 132 08/18/2021    CHOL 228 (H) 05/03/2021    HDL 45 08/18/2021    HDL 39 (L) 05/03/2021    LDL 57 08/18/2021     (H) 05/03/2021    TRIG 151 (H) 08/18/2021    TRIG 256 (H) 05/03/2021    CHOLHDLRATIO 4.4 05/04/2015       LIVER ENZYME RESULTS:  Lab " Results   Component Value Date    AST 20 05/03/2021    ALT 29 08/18/2021    ALT 40 05/03/2021       CBC RESULTS:  Lab Results   Component Value Date    WBC 7.8 05/03/2021    RBC 5.04 05/03/2021    HGB 15.1 05/03/2021    HCT 44.7 05/03/2021    MCV 89 05/03/2021    MCH 30.0 05/03/2021    MCHC 33.8 05/03/2021    RDW 13.3 05/03/2021     05/03/2021       BMP RESULTS:  Lab Results   Component Value Date     05/03/2021    POTASSIUM 4.1 05/03/2021    CHLORIDE 106 05/03/2021    CO2 26 05/03/2021    ANIONGAP 5 05/03/2021     (H) 05/03/2021    BUN 20 05/03/2021    CR 1.08 05/03/2021    GFRESTIMATED 73 05/03/2021    GFRESTBLACK 84 05/03/2021    FLOR 8.9 05/03/2021        A1C RESULTS:  No results found for: A1C    INR RESULTS:  No results found for: INR        Jc Green PA-C  August 20, 2021

## 2021-08-20 NOTE — LETTER
8/20/2021    Alejandro Lewis MD  [address]  [facility] [address]    RE: Sarkis Darnell       Dear Colleague,    I had the pleasure of seeing Sarkis Darnell in the [facility].    Primary Cardiologist: Dr. Pérez    Reason for Visit: One month follow up    History of Present Illness:   Darrell is a pleasant 61 year old male with past medical history notable for hyperlipidemia, metabolic syndrome, GERD, hypothyroidism, ED, and BPD.     He was recently referred to Dr. Pérez for reports of exertional chest pain. His stress echo showed no WMA during stress but 1.5 mm ST depressions in the inferolateral leads. Cath versus conservative management was discussed and patient elected to proceed conservatively. He was started on aspirin and metoprolol and recommended to continue with statin therapy. He returns for follow up.    Darrell returns to clinic stating he is doing well. He recently returned from a road trip to [facility]. He is tolerating his medications. His lipid panel shows significant improvement in his LDL. In the interim he saw GI team and underwent endoscopy and colonoscopy. It sounds like he had some patches in his stomach. He is being tested for H pylori. Colonoscopy looked ok but they took polyp for biopsy.     Assessment and Plan:  Darrell is a pleasant 61 year old male with past medical history notable for hyperlipidemia, metabolic syndrome, GERD, hypothyroidism, ED, and BPD.     He is doing well from cardiac stanpoint. He will resume his exercise gradually. He will return in 1/2022. If he has worsening chest pain in the interim, he will contact our clinic.     This note was completed in part using Dragon voice recognition software. Although reviewed after completion, some word and grammatical errors may occur.    Orders this Visit:  No orders of the defined types were placed in this encounter.    Orders Placed This Encounter   Medications      melatonin 1 MG CAPS     There are no discontinued medications.      Encounter Diagnosis   Name Primary?     Coronary artery disease of native artery of native heart with stable angina pectoris (H)        CURRENT MEDICATIONS:  Current Outpatient Medications   Medication Sig Dispense Refill     aspirin (ASA) 81 MG EC tablet Take 1 tablet (81 mg) by mouth daily       famotidine (PEPCID) 20 MG tablet 1 TABLET 30 MINUTES BEFORE BEDTIME. ONCE A DAY ORALLY FOR 90 DAYS       finasteride (PROSCAR) 5 MG tablet Take 1 tablet (5 mg) by mouth daily 90 tablet 3     levothyroxine (SYNTHROID/LEVOTHROID) 88 MCG tablet TAKE 1 TABLET(88 MCG) BY MOUTH DAILY 90 tablet 2     melatonin 1 MG CAPS        metoprolol succinate ER (TOPROL-XL) 25 MG 24 hr tablet Take 1 tablet (25 mg) by mouth daily 90 tablet 3     pantoprazole (PROTONIX) 40 MG EC tablet Take 1 tablet (40 mg) by mouth daily (Patient taking differently: Take 20 mg by mouth daily ) 90 tablet 1     rosuvastatin (CRESTOR) 40 MG tablet Take 1 tablet (40 mg) by mouth daily 90 tablet 3     tamsulosin (FLOMAX) 0.4 MG capsule Take 1 capsule (0.4 mg) by mouth daily 30 minutes after evening meal 90 capsule 3       ALLERGIES     Allergies   Allergen Reactions     Dust Mites      Sneezing, water eyes       PAST MEDICAL HISTORY:  Past Medical History:   Diagnosis Date     BPH (benign prostatic hyperplasia) 2016    Dr. Gonzalez     Chest pain 1/14    neg est echo     Coronary artery disease of native artery of native heart with stable angina pectoris (H) 7/20/2021     Elevated PSA 2016    Dr. Gonzalez, bx neg     Erectile dysfunction     Dr. Gonzalez     Gastritis      Hypercholesteremia      Hypothyroid 2007     Normal colonoscopy 2010     Vitamin D deficiency 2009    Vitamin D2/D3 of 19       PAST SURGICAL HISTORY:  Past Surgical History:   Procedure Laterality Date     ABDOMEN SURGERY  1/8/2021    Incisional Hernia     BIOPSY  9/2015    Prostate. Later PSA results were found good.     COLONOSCOPY   2010    No issue found.     LAPAROSCOPIC CHOLECYSTECTOMY N/A 11/16/2015    Procedure: LAPAROSCOPIC CHOLECYSTECTOMY;  Surgeon: Sivakumar Burr MD;  Location:  [facility]     LAPAROSCOPIC HERNIORRHAPHY VENTRAL N/A 1/8/2021    Procedure: LAPAROSCOPIC VENTRAL HERNIA REPAIR WITH MESH;  Surgeon: Sivakumar Burr MD;  Location: [facility]     PROSTATE SURGERY      Trus BX       FAMILY HISTORY:  Family History   Problem Relation Age of Onset     C.A.D. Mother      Arthritis Mother      Cancer Mother         ovarian     Coronary Artery Disease Mother         Angina, and Congestive heart failure     Other Cancer Mother         Ovarian     Osteoporosis Mother         After 70 years of age     Thyroid Disease Mother         Found low when 70+     Coronary Artery Disease Father         Angina     Hyperlipidemia Brother      Hypertension Brother      No Known Problems Sister      Cancer Maternal Grandfather         esophageal cancer     Other Cancer Maternal Grandfather         Esophagus     Cancer Paternal Grandmother         liver cancer     Other Cancer Paternal Grandmother         Liver     Cancer Paternal Grandfather         throat cancer     Other Cancer Paternal Grandfather         Throat     Hypertension Brother      Thyroid Disease Brother         Found low when 40+       SOCIAL HISTORY:  Social History     Socioeconomic History     Marital status:      Spouse name: None     Number of children: 2     Years of education: None     Highest education level: None   Occupational History     Occupation:      Employer: [employer]     Employer: [employer]   Tobacco Use     Smoking status: Never Smoker     Smokeless tobacco: Never Used   Substance and Sexual Activity     Alcohol use: Never     Alcohol/week: 0.0 standard drinks     Drug use: Never     Sexual activity: Yes     Partners: Female     Birth control/protection: Abstinence     Comment: New medicines have affected my general routine of life   Other  "Topics Concern     Parent/sibling w/ CABG, MI or angioplasty before 65F 55M? No   Social History Narrative     None     Social Determinants of Health     Financial Resource Strain:      Difficulty of Paying Living Expenses:    Food Insecurity:      Worried About Running Out of Food in the Last Year:      Ran Out of Food in the Last Year:    Transportation Needs:      Lack of Transportation (Medical):      Lack of Transportation (Non-Medical):    Physical Activity:      Days of Exercise per Week:      Minutes of Exercise per Session:    Stress:      Feeling of Stress :    Social Connections:      Frequency of Communication with Friends and Family:      Frequency of Social Gatherings with Friends and Family:      Attends Mormonism Services:      Active Member of Clubs or Organizations:      Attends Club or Organization Meetings:      Marital Status:    Intimate Partner Violence:      Fear of Current or Ex-Partner:      Emotionally Abused:      Physically Abused:      Sexually Abused:        Review of Systems:  Skin:  Negative     Eyes:  Negative    ENT:  Negative    Respiratory:  Positive for dyspnea on exertion  Cardiovascular:  Negative    Gastroenterology: Negative    Genitourinary:  Negative    Musculoskeletal:  Negative    Neurologic:  Negative    Psychiatric:  Negative    Heme/Lymph/Imm:  Negative    Endocrine:  Positive for thyroid disorder    Physical Exam:  Vitals: /71   Pulse 69   Ht 1.676 m (5' 6\")   Wt 89.8 kg (198 lb)   BMI 31.96 kg/m       GEN:  NAD  NECK: No JVD  C/V:  Regular rate and rhythm, no murmur, rub or gallop.  RESP: Clear to auscultation bilaterally without wheezing, rales, or rhonchi.  GI: Abdomen soft, nontender, nondistended.   EXTREM: No pitting LE edema.   NEURO: Alert and oriented, cooperative. No obvious focal deficits.   PSYCH: Normal affect.  SKIN: Warm and dry.       Recent Lab Results:  LIPID RESULTS:  Lab Results   Component Value Date    CHOL 132 08/18/2021    CHOL 228 " (H) 05/03/2021    HDL 45 08/18/2021    HDL 39 (L) 05/03/2021    LDL 57 08/18/2021     (H) 05/03/2021    TRIG 151 (H) 08/18/2021    TRIG 256 (H) 05/03/2021    CHOLHDLRATIO 4.4 05/04/2015       LIVER ENZYME RESULTS:  Lab Results   Component Value Date    AST 20 05/03/2021    ALT 29 08/18/2021    ALT 40 05/03/2021       CBC RESULTS:  Lab Results   Component Value Date    WBC 7.8 05/03/2021    RBC 5.04 05/03/2021    HGB 15.1 05/03/2021    HCT 44.7 05/03/2021    MCV 89 05/03/2021    MCH 30.0 05/03/2021    MCHC 33.8 05/03/2021    RDW 13.3 05/03/2021     05/03/2021       BMP RESULTS:  Lab Results   Component Value Date     05/03/2021    POTASSIUM 4.1 05/03/2021    CHLORIDE 106 05/03/2021    CO2 26 05/03/2021    ANIONGAP 5 05/03/2021     (H) 05/03/2021    BUN 20 05/03/2021    CR 1.08 05/03/2021    GFRESTIMATED 73 05/03/2021    GFRESTBLACK 84 05/03/2021    FLOR 8.9 05/03/2021        A1C RESULTS:  No results found for: A1C    INR RESULTS:  No results found for: INR        Jc Green PA-C  August 20, 2021         Thank you for allowing me to participate in the care of your patient.      Sincerely,     Jc Green PA-C     [facility]  cc:   Jude Pérez MD  [address]  [place] [address]

## 2021-08-20 NOTE — PATIENT INSTRUCTIONS
If you have questions or concerns please call my nurse team at [phone].     Scheduling phone number: [phone]  Reminder: Please bring in all current medications, over the counter supplements and vitamin bottles to your next appointment.    It was a pleasure seeing you today!     Jc Green PA-C  8/20/2021

## 2021-09-08 ENCOUNTER — OFFICE VISIT (OUTPATIENT)
Dept: UROLOGY | Facility: CLINIC | Age: 61
End: 2021-09-08
Payer: COMMERCIAL

## 2021-09-08 VITALS
HEIGHT: 67 IN | DIASTOLIC BLOOD PRESSURE: 70 MMHG | SYSTOLIC BLOOD PRESSURE: 130 MMHG | WEIGHT: 197 LBS | HEART RATE: 78 BPM | BODY MASS INDEX: 30.92 KG/M2 | OXYGEN SATURATION: 99 %

## 2021-09-08 DIAGNOSIS — R97.20 ELEVATED PROSTATE SPECIFIC ANTIGEN (PSA): Primary | ICD-10-CM

## 2021-09-08 DIAGNOSIS — N52.9 ERECTILE DYSFUNCTION, UNSPECIFIED ERECTILE DYSFUNCTION TYPE: ICD-10-CM

## 2021-09-08 LAB — PSA SERPL-MCNC: 4.7 UG/L (ref 0–4)

## 2021-09-08 PROCEDURE — 99214 OFFICE O/P EST MOD 30 MIN: CPT | Performed by: STUDENT IN AN ORGANIZED HEALTH CARE EDUCATION/TRAINING PROGRAM

## 2021-09-08 PROCEDURE — 84153 ASSAY OF PSA TOTAL: CPT | Performed by: STUDENT IN AN ORGANIZED HEALTH CARE EDUCATION/TRAINING PROGRAM

## 2021-09-08 PROCEDURE — 36415 COLL VENOUS BLD VENIPUNCTURE: CPT | Performed by: STUDENT IN AN ORGANIZED HEALTH CARE EDUCATION/TRAINING PROGRAM

## 2021-09-08 ASSESSMENT — MIFFLIN-ST. JEOR: SCORE: 1657.22

## 2021-09-08 ASSESSMENT — PAIN SCALES - GENERAL: PAINLEVEL: NO PAIN (0)

## 2021-09-08 NOTE — LETTER
"9/8/2021       RE: Sarkis Darnell  [address] [facility]  [place] [address]     Dear Colleague,    Thank you for referring your patient, Sarkis Darnell, to the [facility] at [facility]. Please see a copy of my visit note below.    CHIEF COMPLAINT   Sarkis Darnell who is a 61 year old male returns today for follow-up of elevated PSA, erectile dysfunction.      HPI   Sarkis Darnell is a 61 year old male who presents with a history of elevated PSA.  Former [facility] patient    Prior negative biopsy in the past in 2015/16, no FH of prostate cancer.    MRI prostate 2018 and 2020 pirads 2, with growth from 43->65 g during that time.    Last saw [facility] 1/28/2021, SARBJIT was normal. He was started on finasteride and flomax at that time for elevated  ml, worsening LUTS with urgency and slow stream.     Has retrograde ejaculation related to the tamsulosin.     PSA today 4.7 ng/ml (appropriate drop after finasteride)    Patient was previously on trimix#9 for erectile dysfunction.    PHYSICAL EXAM  Patient is a 61 year old  male   Vitals: Blood pressure 130/70, pulse 78, height 1.702 m (5' 7\"), weight 89.4 kg (197 lb), SpO2 99 %.  Body mass index is 30.85 kg/m .  General Appearance Adult:   Alert, no acute distress, oriented  HENT: throat/mouth:normal, good dentition  Lungs: no respiratory distress, or pursed lip breathing  Heart: No obvious jugular venous distension present  Abdomen: soft, nontender, no organomegaly or masses  Musculoskeltal: extremities normal, no peripheral edema  Skin: no suspicious lesions or rashes  Neuro: Alert, oriented, speech and mentation normal  Psych: affect and mood normal  Gait: Normal  : 50 gram prostate  Firm, symmetric, anodular, with vague firmness in left side but no discrete nodule    Component PSA PSA Diag Urologic Phys   Latest Ref Rng & Units 0 - 4 ug/L 0.00 - 4.00 ng/mL   7/29/2015 6.8    11/21/2017 7.07 (H)  "   7/25/2018  7.60 (H)   3/4/2020  11.50 (H)   4/16/2020 8.98 (H)    1/7/2021 9.71 (H)    PSA today 4.7 ng/ml on finasteride    ASSESSMENT and PLAN  61 year old male who presents with a history of elevated PSA, erectile dysfunction. PSA is down to 4.7 ng/ml on finasteride, which means it is essentially stably elevated around 9.4 ng/ml. Has vague firmness on left side of prostate but no discrete nodule    Refill trimix #9 for erectile dysfunction  UA, PVR today  Prostate MRI  If there is concerning lesion, will plan Uronav biopsy  If no concerning lesion, follow up 6 months with PSA prior - addendum, MRI prostate PIRADS 2, will follow up in 6 months with PSA prior    Hang Linares MD   [facility]  [facility] Phone: [phone]

## 2021-09-08 NOTE — PROGRESS NOTES
"CHIEF COMPLAINT   Sarkis Darnell who is a 61 year old male returns today for follow-up of elevated PSA, erectile dysfunction.      HPI   Sarkis Darnell is a 61 year old male who presents with a history of elevated PSA.  Former [facility] patient    Prior negative biopsy in the past in 2015/16, no FH of prostate cancer.    MRI prostate 2018 and 2020 pirads 2, with growth from 43->65 g during that time.    Last saw [facility] 1/28/2021, SARBJIT was normal. He was started on finasteride and flomax at that time for elevated  ml, worsening LUTS with urgency and slow stream.     Has retrograde ejaculation related to the tamsulosin.     PSA today 4.7 ng/ml (appropriate drop after finasteride)    Patient was previously on trimix#9 for erectile dysfunction.    PHYSICAL EXAM  Patient is a 61 year old  male   Vitals: Blood pressure 130/70, pulse 78, height 1.702 m (5' 7\"), weight 89.4 kg (197 lb), SpO2 99 %.  Body mass index is 30.85 kg/m .  General Appearance Adult:   Alert, no acute distress, oriented  HENT: throat/mouth:normal, good dentition  Lungs: no respiratory distress, or pursed lip breathing  Heart: No obvious jugular venous distension present  Abdomen: soft, nontender, no organomegaly or masses  Musculoskeltal: extremities normal, no peripheral edema  Skin: no suspicious lesions or rashes  Neuro: Alert, oriented, speech and mentation normal  Psych: affect and mood normal  Gait: Normal  : 50 gram prostate  Firm, symmetric, anodular, with vague firmness in left side but no discrete nodule    Component PSA PSA Diag Urologic Phys   Latest Ref Rng & Units 0 - 4 ug/L 0.00 - 4.00 ng/mL   7/29/2015 6.8    11/21/2017 7.07 (H)    7/25/2018  7.60 (H)   3/4/2020  11.50 (H)   4/16/2020 8.98 (H)    1/7/2021 9.71 (H)    PSA today 4.7 ng/ml on finasteride    ASSESSMENT and PLAN  61 year old male who presents with a history of elevated PSA, erectile dysfunction. PSA is down to 4.7 ng/ml on finasteride, which means it is essentially stably " elevated around 9.4 ng/ml. Has vague firmness on left side of prostate but no discrete nodule    Refill trimix #9 for erectile dysfunction  UA, PVR today  Prostate MRI  If there is concerning lesion, will plan Uronav biopsy  If no concerning lesion, follow up 6 months with PSA prior - addendum, MRI prostate PIRADS 2, will follow up in 6 months with PSA prior    Hang Linares MD   [facility]  [facility] Phone: [phone]

## 2021-09-08 NOTE — PATIENT INSTRUCTIONS
Schedule prostate MRI    If concerning lesion, will schedule prostate biopsy (Uronav)  If no lesion, will plan follow up 6 months with PSA prior

## 2021-09-09 PROBLEM — D12.4 ADENOMATOUS POLYP OF DESCENDING COLON: Status: ACTIVE | Noted: 2021-08-01

## 2021-09-15 ENCOUNTER — HOSPITAL ENCOUNTER (OUTPATIENT)
Dept: MRI IMAGING | Facility: CLINIC | Age: 61
Discharge: HOME | End: 2021-09-15
Attending: STUDENT IN AN ORGANIZED HEALTH CARE EDUCATION/TRAINING PROGRAM | Admitting: STUDENT IN AN ORGANIZED HEALTH CARE EDUCATION/TRAINING PROGRAM
Payer: COMMERCIAL

## 2021-09-15 DIAGNOSIS — R97.20 ELEVATED PROSTATE SPECIFIC ANTIGEN (PSA): ICD-10-CM

## 2021-09-15 PROCEDURE — A9585 GADOBUTROL INJECTION: HCPCS | Performed by: STUDENT IN AN ORGANIZED HEALTH CARE EDUCATION/TRAINING PROGRAM

## 2021-09-15 PROCEDURE — 72197 MRI PELVIS W/O & W/DYE: CPT | Mod: 26 | Performed by: RADIOLOGY

## 2021-09-15 PROCEDURE — 72197 MRI PELVIS W/O & W/DYE: CPT

## 2021-09-15 PROCEDURE — 255N000002 HC RX 255 OP 636: Performed by: STUDENT IN AN ORGANIZED HEALTH CARE EDUCATION/TRAINING PROGRAM

## 2021-09-15 RX ORDER — GADOBUTROL 604.72 MG/ML
10 INJECTION INTRAVENOUS ONCE
Status: COMPLETED | OUTPATIENT
Start: 2021-09-15 | End: 2021-09-15

## 2021-09-15 RX ADMIN — GADOBUTROL 10 ML: 604.72 INJECTION INTRAVENOUS at 09:23

## 2021-09-24 ENCOUNTER — TELEPHONE (OUTPATIENT)
Dept: UROLOGY | Facility: CLINIC | Age: 61
End: 2021-09-24

## 2021-09-24 NOTE — TELEPHONE ENCOUNTER
----- Message from Hang Linares MD sent at 9/22/2021  8:32 AM CDT -----  Needs follow up in 6 months with PSA prior, for UA, PVR, AUA symptom score, cystoscopy    PIRADS 2 - Clinically significant cancer is unlikely  to be present.    Hang Linares MD   [facility]  [phone] clinic phone

## 2021-09-25 ENCOUNTER — HEALTH MAINTENANCE LETTER (OUTPATIENT)
Age: 61
End: 2021-09-25

## 2021-11-01 ENCOUNTER — TRANSFERRED RECORDS (OUTPATIENT)
Dept: HEALTH INFORMATION MANAGEMENT | Facility: CLINIC | Age: 61
End: 2021-11-01
Payer: COMMERCIAL

## 2022-01-11 ENCOUNTER — TRANSFERRED RECORDS (OUTPATIENT)
Dept: HEALTH INFORMATION MANAGEMENT | Facility: CLINIC | Age: 62
End: 2022-01-11
Payer: COMMERCIAL

## 2022-02-08 DIAGNOSIS — R39.12 BENIGN PROSTATIC HYPERPLASIA WITH WEAK URINARY STREAM: ICD-10-CM

## 2022-02-08 DIAGNOSIS — N40.1 BENIGN PROSTATIC HYPERPLASIA WITH WEAK URINARY STREAM: ICD-10-CM

## 2022-02-08 DIAGNOSIS — R97.20 ELEVATED PROSTATE SPECIFIC ANTIGEN (PSA): ICD-10-CM

## 2022-02-08 DIAGNOSIS — R33.9 URINARY RETENTION: ICD-10-CM

## 2022-02-08 DIAGNOSIS — R39.15 URGENCY OF URINATION: ICD-10-CM

## 2022-02-08 RX ORDER — FINASTERIDE 5 MG/1
TABLET, FILM COATED ORAL
Qty: 90 TABLET | Refills: 2 | Status: SHIPPED | OUTPATIENT
Start: 2022-02-08

## 2022-02-08 RX ORDER — TAMSULOSIN HYDROCHLORIDE 0.4 MG/1
CAPSULE ORAL
Qty: 90 CAPSULE | Refills: 2 | Status: SHIPPED | OUTPATIENT
Start: 2022-02-08

## 2022-03-08 ENCOUNTER — TRANSFERRED RECORDS (OUTPATIENT)
Dept: HEALTH INFORMATION MANAGEMENT | Facility: CLINIC | Age: 62
End: 2022-03-08
Payer: COMMERCIAL

## 2022-03-17 ENCOUNTER — IMMUNIZATION (OUTPATIENT)
Dept: NURSING | Facility: CLINIC | Age: 62
End: 2022-03-17
Payer: COMMERCIAL

## 2022-03-17 PROCEDURE — 0054A COVID-19,PF,PFIZER (12+ YRS): CPT

## 2022-03-17 PROCEDURE — 91305 COVID-19,PF,PFIZER (12+ YRS): CPT

## 2022-05-26 ENCOUNTER — PRE VISIT (OUTPATIENT)
Dept: CARDIOLOGY | Facility: CLINIC | Age: 62
End: 2022-05-26
Payer: COMMERCIAL

## 2022-05-26 DIAGNOSIS — E78.2 MIXED HYPERCHOLESTEROLEMIA AND HYPERTRIGLYCERIDEMIA: Primary | ICD-10-CM

## 2022-05-26 DIAGNOSIS — I25.118 CORONARY ARTERY DISEASE OF NATIVE ARTERY OF NATIVE HEART WITH STABLE ANGINA PECTORIS (H): ICD-10-CM

## 2022-06-21 ENCOUNTER — MYC MEDICAL ADVICE (OUTPATIENT)
Dept: CARDIOLOGY | Facility: CLINIC | Age: 62
End: 2022-06-21

## 2022-06-21 NOTE — TELEPHONE ENCOUNTER
My Chart message 6-21-22  Iván Greene,     For the upcoming appointment on 6/28 with Dr. Pérez, I would like to know if you plan to schedule stress test or any other test in addition to blood tests? Right now, I am only scheduled for blood tests (fasting) on 6/24.     Please let me know.  Thanks,  Darrell

## 2022-06-22 NOTE — TELEPHONE ENCOUNTER
Reply from patient:  Mann Darrell [facility] Heart Team 2  Thank you Montse for confirming that only labs are needed at this point. Nothing has changed on my side.     Regarding the stress test, I was under the impression it may be needed to compare with the last stress test to evaluate the effectiveness of the medication plan initiated a year back. I just wanted to make sure we are all set for the visit and evaluation.     In your email, you mentioned Jc is going to see me. So, Dr. Pérez will not be available for the annual visit? I have met him only once i.e. on my first visit.     Best,       Reply to patient:    This is a good discussion to have with ABDIAZIZ Jc Green for future testing. She will assess how soon you will need to meet with Dr. Pérez.    Thanks for checking  Team 2 RNs  [phone]

## 2022-06-22 NOTE — TELEPHONE ENCOUNTER
Attempted to contact patient to see if he has any new or progressive symptom changes. Reviewed that a stress test would not be ordered routinely unless something has changed. Left a message for patient to call back with any questions or concerns.

## 2022-06-24 ENCOUNTER — LAB (OUTPATIENT)
Dept: LAB | Facility: CLINIC | Age: 62
End: 2022-06-24
Payer: COMMERCIAL

## 2022-06-24 DIAGNOSIS — I25.118 CORONARY ARTERY DISEASE OF NATIVE ARTERY OF NATIVE HEART WITH STABLE ANGINA PECTORIS (H): ICD-10-CM

## 2022-06-24 DIAGNOSIS — E78.2 MIXED HYPERCHOLESTEROLEMIA AND HYPERTRIGLYCERIDEMIA: ICD-10-CM

## 2022-06-24 PROCEDURE — 80061 LIPID PANEL: CPT

## 2022-06-24 PROCEDURE — 36415 COLL VENOUS BLD VENIPUNCTURE: CPT

## 2022-06-24 PROCEDURE — 84460 ALANINE AMINO (ALT) (SGPT): CPT

## 2022-06-26 LAB
ALT SERPL W P-5'-P-CCNC: 23 U/L (ref 0–70)
CHOLEST SERPL-MCNC: 116 MG/DL
FASTING STATUS PATIENT QL REPORTED: YES
HDLC SERPL-MCNC: 41 MG/DL
LDLC SERPL CALC-MCNC: 42 MG/DL
NONHDLC SERPL-MCNC: 75 MG/DL
TRIGL SERPL-MCNC: 163 MG/DL

## 2022-06-28 ENCOUNTER — OFFICE VISIT (OUTPATIENT)
Dept: CARDIOLOGY | Facility: CLINIC | Age: 62
End: 2022-06-28
Attending: INTERNAL MEDICINE
Payer: COMMERCIAL

## 2022-06-28 VITALS
DIASTOLIC BLOOD PRESSURE: 75 MMHG | HEART RATE: 62 BPM | SYSTOLIC BLOOD PRESSURE: 125 MMHG | WEIGHT: 204.4 LBS | BODY MASS INDEX: 32.08 KG/M2 | HEIGHT: 67 IN

## 2022-06-28 DIAGNOSIS — R94.39 ABNORMAL CARDIOVASCULAR STRESS TEST: ICD-10-CM

## 2022-06-28 DIAGNOSIS — I87.2 VENOUS (PERIPHERAL) INSUFFICIENCY: ICD-10-CM

## 2022-06-28 DIAGNOSIS — E66.811 CLASS 1 OBESITY DUE TO EXCESS CALORIES WITH SERIOUS COMORBIDITY AND BODY MASS INDEX (BMI) OF 32.0 TO 32.9 IN ADULT: ICD-10-CM

## 2022-06-28 DIAGNOSIS — R60.0 PERIPHERAL EDEMA: ICD-10-CM

## 2022-06-28 DIAGNOSIS — E78.2 MIXED HYPERCHOLESTEROLEMIA AND HYPERTRIGLYCERIDEMIA: ICD-10-CM

## 2022-06-28 DIAGNOSIS — I25.118 CORONARY ARTERY DISEASE OF NATIVE ARTERY OF NATIVE HEART WITH STABLE ANGINA PECTORIS (H): Primary | ICD-10-CM

## 2022-06-28 DIAGNOSIS — E66.09 CLASS 1 OBESITY DUE TO EXCESS CALORIES WITH SERIOUS COMORBIDITY AND BODY MASS INDEX (BMI) OF 32.0 TO 32.9 IN ADULT: ICD-10-CM

## 2022-06-28 PROCEDURE — 99214 OFFICE O/P EST MOD 30 MIN: CPT | Performed by: INTERNAL MEDICINE

## 2022-06-28 RX ORDER — MULTIVIT-MIN/IRON/FOLIC ACID/K 18-600-40
2000 CAPSULE ORAL
COMMUNITY

## 2022-06-28 NOTE — PROGRESS NOTES
HPI and Plan:   See dictation    Today's clinic visit entailed:  Review of the result(s) of each unique test - Lab work, stress echocardiogram  Ordering of each unique test  Prescription drug management  30 minutes spent on the date of the encounter doing chart review, history and exam, documentation and further activities per the note  Provider  Link to MDM Help Grid     The level of medical decision making during this visit was of moderate complexity.      Orders Placed This Encounter   Procedures     Lipid Profile     ALT     Follow-Up with Cardiology ABDIAZIZ     Follow-Up with Cardiology       Orders Placed This Encounter   Medications     Vitamin D, Cholecalciferol, 25 MCG (1000 UT) TABS     Sig: Take 2,000 Units by mouth Nature made D 3       There are no discontinued medications.      Encounter Diagnoses   Name Primary?     Coronary artery disease of native artery of native heart with stable angina pectoris (H) Yes     Mixed hypercholesterolemia and hypertriglyceridemia      Abnormal cardiovascular stress test      Class 1 obesity due to excess calories with serious comorbidity and body mass index (BMI) of 32.0 to 32.9 in adult      Peripheral edema      Venous (peripheral) insufficiency        CURRENT MEDICATIONS:  Current Outpatient Medications   Medication Sig Dispense Refill     aspirin (ASA) 81 MG EC tablet Take 1 tablet (81 mg) by mouth daily       famotidine (PEPCID) 20 MG tablet 1 TABLET 30 MINUTES BEFORE BEDTIME. ONCE A DAY ORALLY FOR 90 DAYS       finasteride (PROSCAR) 5 MG tablet TAKE 1 TABLET(5 MG) BY MOUTH DAILY 90 tablet 2     levothyroxine (SYNTHROID/LEVOTHROID) 88 MCG tablet TAKE 1 TABLET(88 MCG) BY MOUTH DAILY 90 tablet 0     melatonin 1 MG CAPS        metoprolol succinate ER (TOPROL-XL) 25 MG 24 hr tablet Take 1 tablet (25 mg) by mouth daily 90 tablet 3     rosuvastatin (CRESTOR) 40 MG tablet Take 1 tablet (40 mg) by mouth daily 90 tablet 3     tamsulosin (FLOMAX) 0.4 MG capsule TAKE 1  CAPSULE(0.4 MG) BY MOUTH DAILY 30 MINUTES AFTER THE EVENING MEAL 90 capsule 2     Vitamin D, Cholecalciferol, 25 MCG (1000 UT) TABS Take 2,000 Units by mouth Nature made D 3       pantoprazole (PROTONIX) 40 MG EC tablet Take 1 tablet (40 mg) by mouth daily (Patient taking differently: Take 20 mg by mouth daily ) 90 tablet 1       ALLERGIES     Allergies   Allergen Reactions     Dust Mites      Sneezing, water eyes       PAST MEDICAL HISTORY:  Past Medical History:   Diagnosis Date     Adenomatous polyp of transverse colon 08/2021    fu 7 years     BPH (benign prostatic hyperplasia) 2016    Dr. Gonzalez     Burning sensation of throat 2021    egd nl,     Chest pain 01/2014    neg est echo     Coronary artery disease of native artery of native heart with stable angina pectoris (H) 07/20/2021     Elevated PSA 2016    Dr. Gonzalez, bx neg     Erectile dysfunction     Dr. Gonzalez     Gastritis      Hypercholesteremia      Hypothyroid 2007     Normal colonoscopy 2010     Vitamin D deficiency 2009    Vitamin D2/D3 of 19       PAST SURGICAL HISTORY:  Past Surgical History:   Procedure Laterality Date     ABDOMEN SURGERY  1/8/2021    Incisional Hernia     BIOPSY  9/2015    Prostate. Later PSA results were found good.     COLONOSCOPY  2010    No issue found.     LAPAROSCOPIC CHOLECYSTECTOMY N/A 11/16/2015    Procedure: LAPAROSCOPIC CHOLECYSTECTOMY;  Surgeon: Sivakumar Burr MD;  Location: [facility]     LAPAROSCOPIC HERNIORRHAPHY VENTRAL N/A 1/8/2021    Procedure: LAPAROSCOPIC VENTRAL HERNIA REPAIR WITH MESH;  Surgeon: Sivakumar Burr MD;  Location:  OR     PROSTATE SURGERY      Trus BX       FAMILY HISTORY:  Family History   Problem Relation Age of Onset     C.A.D. Mother      Arthritis Mother      Cancer Mother         ovarian     Coronary Artery Disease Mother         Angina, and Congestive heart failure     Other Cancer Mother         Ovarian     Osteoporosis Mother         After 70 years of age     Thyroid Disease  "Mother         Found low when 70+     Coronary Artery Disease Father         Angina     Hyperlipidemia Brother      Hypertension Brother      No Known Problems Sister      Cancer Maternal Grandfather         esophageal cancer     Other Cancer Maternal Grandfather         Esophagus     Cancer Paternal Grandmother         liver cancer     Other Cancer Paternal Grandmother         Liver     Cancer Paternal Grandfather         throat cancer     Other Cancer Paternal Grandfather         Throat     Hypertension Brother      Thyroid Disease Brother         Found low when 40+       SOCIAL HISTORY:  Social History     Socioeconomic History     Marital status:      Spouse name: None     Number of children: 2     Years of education: None     Highest education level: None   Occupational History     Occupation:      Employer: [employer]     Employer: [employer]   Tobacco Use     Smoking status: Never Smoker     Smokeless tobacco: Never Used   Substance and Sexual Activity     Alcohol use: Never     Alcohol/week: 0.0 standard drinks     Drug use: Never     Sexual activity: Yes     Partners: Female     Birth control/protection: Abstinence     Comment: New medicines have affected my general routine of life   Other Topics Concern     Parent/sibling w/ CABG, MI or angioplasty before 65F 55M? No       Review of Systems:  Skin:  Negative   left middle finger pain upon touching the skin   Eyes:  Negative glasses    ENT:  Negative hearing loss right ear  Respiratory:  Positive for dyspnea on exertion     Cardiovascular:  Negative Positive for;chest pain;edema with activity  Gastroenterology: Negative heartburn treated  Genitourinary:  Negative urgency;nocturia    Musculoskeletal:  Negative      Neurologic:  Negative      Psychiatric:  Negative      Heme/Lymph/Imm:  Negative   seasonal  Endocrine:  Positive for thyroid disorder      Physical Exam:  Vitals: /75   Pulse 62   Ht 1.702 m (5' 7\")   Wt 92.7 kg (204 " lb 6.4 oz)   BMI 32.01 kg/m      Constitutional:  cooperative, alert and oriented, well developed, well nourished, in no acute distress obese      Skin:  warm and dry to the touch, no apparent skin lesions or masses noted          Head:  normocephalic, no masses or lesions        Eyes:  pupils equal and round, conjunctivae and lids unremarkable, sclera white, no xanthalasma, EOMS intact, no nystagmus        Lymph:      ENT:  no pallor or cyanosis, dentition good        Neck:  carotid pulses are full and equal bilaterally;no carotid bruit        Respiratory:  normal breath sounds, clear to auscultation, normal A-P diameter, normal symmetry, normal respiratory excursion, no use of accessory muscles         Cardiac: regular rhythm;no murmurs, gallops or rubs detected                pulses full and equal                                        GI:    obese      Extremities and Muscular Skeletal:  no edema;no spinal abnormalities noted;normal muscle strength and tone   varicose vein RLE edema;trace        Neurological:  no gross motor deficits        Psych:  affect appropriate, oriented to time, person and place        CC  Jude Pérez MD  [address]  [place] [id number]

## 2022-06-28 NOTE — LETTER
6/28/2022    Alejandro Lewis MD  [address]  [place] [address]    RE: Sarkis Darnell       Dear Colleague,     I had the pleasure of seeing Sarkis Darnell in the [facility].  HPI and Plan:   See dictation    Today's clinic visit entailed:  Review of the result(s) of each unique test - Lab work, stress echocardiogram  Ordering of each unique test  Prescription drug management  30 minutes spent on the date of the encounter doing chart review, history and exam, documentation and further activities per the note  Provider  Link to MDM Help Grid     The level of medical decision making during this visit was of moderate complexity.      Orders Placed This Encounter   Procedures     Lipid Profile     ALT     Follow-Up with Cardiology ABDIAZIZ     Follow-Up with Cardiology       Orders Placed This Encounter   Medications     Vitamin D, Cholecalciferol, 25 MCG (1000 UT) TABS     Sig: Take 2,000 Units by mouth Nature made D 3       There are no discontinued medications.      Encounter Diagnoses   Name Primary?     Coronary artery disease of native artery of native heart with stable angina pectoris (H) Yes     Mixed hypercholesterolemia and hypertriglyceridemia      Abnormal cardiovascular stress test      Class 1 obesity due to excess calories with serious comorbidity and body mass index (BMI) of 32.0 to 32.9 in adult      Peripheral edema      Venous (peripheral) insufficiency        CURRENT MEDICATIONS:  Current Outpatient Medications   Medication Sig Dispense Refill     aspirin (ASA) 81 MG EC tablet Take 1 tablet (81 mg) by mouth daily       famotidine (PEPCID) 20 MG tablet 1 TABLET 30 MINUTES BEFORE BEDTIME. ONCE A DAY ORALLY FOR 90 DAYS       finasteride (PROSCAR) 5 MG tablet TAKE 1 TABLET(5 MG) BY MOUTH DAILY 90 tablet 2     levothyroxine (SYNTHROID/LEVOTHROID) 88 MCG tablet TAKE 1 TABLET(88 MCG) BY MOUTH DAILY 90 tablet 0     melatonin 1 MG CAPS        metoprolol succinate ER (TOPROL-XL) 25 MG 24  hr tablet Take 1 tablet (25 mg) by mouth daily 90 tablet 3     rosuvastatin (CRESTOR) 40 MG tablet Take 1 tablet (40 mg) by mouth daily 90 tablet 3     tamsulosin (FLOMAX) 0.4 MG capsule TAKE 1 CAPSULE(0.4 MG) BY MOUTH DAILY 30 MINUTES AFTER THE EVENING MEAL 90 capsule 2     Vitamin D, Cholecalciferol, 25 MCG (1000 UT) TABS Take 2,000 Units by mouth Nature made D 3       pantoprazole (PROTONIX) 40 MG EC tablet Take 1 tablet (40 mg) by mouth daily (Patient taking differently: Take 20 mg by mouth daily ) 90 tablet 1       ALLERGIES     Allergies   Allergen Reactions     Dust Mites      Sneezing, water eyes       PAST MEDICAL HISTORY:  Past Medical History:   Diagnosis Date     Adenomatous polyp of transverse colon 08/2021    fu 7 years     BPH (benign prostatic hyperplasia) 2016    Dr. Gonzalez     Burning sensation of throat 2021    egd nl,     Chest pain 01/2014    neg est echo     Coronary artery disease of native artery of native heart with stable angina pectoris (H) 07/20/2021     Elevated PSA 2016    Dr. Gonzalez, bx neg     Erectile dysfunction     Dr. Gonzalez     Gastritis      Hypercholesteremia      Hypothyroid 2007     Normal colonoscopy 2010     Vitamin D deficiency 2009    Vitamin D2/D3 of 19       PAST SURGICAL HISTORY:  Past Surgical History:   Procedure Laterality Date     ABDOMEN SURGERY  1/8/2021    Incisional Hernia     BIOPSY  9/2015    Prostate. Later PSA results were found good.     COLONOSCOPY  2010    No issue found.     LAPAROSCOPIC CHOLECYSTECTOMY N/A 11/16/2015    Procedure: LAPAROSCOPIC CHOLECYSTECTOMY;  Surgeon: Sivakumar Burr MD;  Location: [facility]     LAPAROSCOPIC HERNIORRHAPHY VENTRAL N/A 1/8/2021    Procedure: LAPAROSCOPIC VENTRAL HERNIA REPAIR WITH MESH;  Surgeon: Sivakumar Burr MD;  Location:  OR     PROSTATE SURGERY      Trus BX       FAMILY HISTORY:  Family History   Problem Relation Age of Onset     C.A.D. Mother      Arthritis Mother      Cancer Mother         ovarian      Coronary Artery Disease Mother         Angina, and Congestive heart failure     Other Cancer Mother         Ovarian     Osteoporosis Mother         After 70 years of age     Thyroid Disease Mother         Found low when 70+     Coronary Artery Disease Father         Angina     Hyperlipidemia Brother      Hypertension Brother      No Known Problems Sister      Cancer Maternal Grandfather         esophageal cancer     Other Cancer Maternal Grandfather         Esophagus     Cancer Paternal Grandmother         liver cancer     Other Cancer Paternal Grandmother         Liver     Cancer Paternal Grandfather         throat cancer     Other Cancer Paternal Grandfather         Throat     Hypertension Brother      Thyroid Disease Brother         Found low when 40+       SOCIAL HISTORY:  Social History     Socioeconomic History     Marital status:      Spouse name: None     Number of children: 2     Years of education: None     Highest education level: None   Occupational History     Occupation: quality engineer     Employer: [employer]     Employer: [employer]   Tobacco Use     Smoking status: Never Smoker     Smokeless tobacco: Never Used   Substance and Sexual Activity     Alcohol use: Never     Alcohol/week: 0.0 standard drinks     Drug use: Never     Sexual activity: Yes     Partners: Female     Birth control/protection: Abstinence     Comment: New medicines have affected my general routine of life   Other Topics Concern     Parent/sibling w/ CABG, MI or angioplasty before 65F 55M? No       Review of Systems:  Skin:  Negative   left middle finger pain upon touching the skin   Eyes:  Negative glasses    ENT:  Negative hearing loss right ear  Respiratory:  Positive for dyspnea on exertion     Cardiovascular:  Negative Positive for;chest pain;edema with activity  Gastroenterology: Negative heartburn treated  Genitourinary:  Negative urgency;nocturia    Musculoskeletal:  Negative      Neurologic:  Negative     "  Psychiatric:  Negative      Heme/Lymph/Imm:  Negative   seasonal  Endocrine:  Positive for thyroid disorder      Physical Exam:  Vitals: /75   Pulse 62   Ht 1.702 m (5' 7\")   Wt 92.7 kg (204 lb 6.4 oz)   BMI 32.01 kg/m      Constitutional:  cooperative, alert and oriented, well developed, well nourished, in no acute distress obese      Skin:  warm and dry to the touch, no apparent skin lesions or masses noted          Head:  normocephalic, no masses or lesions        Eyes:  pupils equal and round, conjunctivae and lids unremarkable, sclera white, no xanthalasma, EOMS intact, no nystagmus        Lymph:      ENT:  no pallor or cyanosis, dentition good        Neck:  carotid pulses are full and equal bilaterally;no carotid bruit        Respiratory:  normal breath sounds, clear to auscultation, normal A-P diameter, normal symmetry, normal respiratory excursion, no use of accessory muscles         Cardiac: regular rhythm;no murmurs, gallops or rubs detected                pulses full and equal                                        GI:    obese      Extremities and Muscular Skeletal:  no edema;no spinal abnormalities noted;normal muscle strength and tone   varicose vein RLE edema;trace        Neurological:  no gross motor deficits        Psych:  affect appropriate, oriented to time, person and place        CC  Jude Pérez MD  [address]  [place] [id number]                Service Date: 06/28/2022    HISTORY OF PRESENT ILLNESS:  Renny is a very nice 62-year-old gentleman that I met 1 year ago.  I take care of his father and his brother-in-law.  He is a referral from Dr. Alejandro Lewis.    Renny has a past medical history significant for mixed hyperlipidemia with elevated triglycerides and low HDL, hypothyroidism, GERD, erectile dysfunction, benign prostatic hypertrophy.  He is a lifelong nonsmoker, does not have diabetes mellitus.  He does have central obesity.    He has a history of substernal " chest discomfort that radiates to his left shoulder occur at higher levels of activity such as mowing the lawn.  If he stops, he thinks it goes away in 5 minutes or less.  He has had no prolonged episodes.  When I saw him last year, he had undergone a stress test for which he was able to exercise 7 minutes of a standard Ramon protocol.  He did get 0.5 mm of ST-segment depression in the inferolateral leads.  The echo portion did appear to be normal.  He wanted a conservative course and did not want to go to the Cath Lab.  He has been started on a statin and recommendation to exercise 30 minutes 5 times a week and eat a predominantly plant-based diet.    He returns to follow up stating he thinks he is doing about the same.  He states he will have to stop mowing his lawn after he is into it for about 20 minutes.  If he hurries and goes too fast, he will get it earlier.  Again it is alleviated in about 5 minutes.    He has no lightheadedness, dizziness, syncope or near syncope.  No dyspnea on exertion, orthopnea or PND.  He does have ankle swelling, right leg worse than left, especially on long plane flights or if he is sitting at home working for prolonged periods of time.    ASSESSMENT AND PLAN:  Renny has chronic stable exertional angina.  We reviewed his stress test.  We talked again about what this is.  He asks about atherosclerotic regression, how we can check into that.  Overall he thinks he is doing about the same as last year.    No symptoms to suggest heart failure or significant arrhythmia.    Cholesterol profile is excellent.  Total cholesterol is 116, HDL is 41, LDL is 42, triglycerides are 163.  This probably just reflects his recent diet.  He has normal thyroid function tests, does not have diabetes, does not drink alcohol.    His weight is 204 pounds, giving a body mass index of 32, and I have emphasized the importance of more consistent exercise and losing more weight.  This will help atherosclerotic  regression, his anginal symptoms and his hypertriglyceridemia.  I will have him follow up with my ABDIAZIZ in 6 months.  I will see him back in a year.  If he should have any problems, I would be glad to see him sooner.    Thank you for allowing me to participate in his care.    Jude Pérez MD, [facility]        D: 2022   T: 2022   MT:     Name:     EARL CHRISTIANSON  MRN:      [id number]        Account:      [id number]   :      1960           Service Date: 2022       Document: [id number]      Thank you for allowing me to participate in the care of your patient.      Sincerely,     Jude Pérez MD     [facility]  cc:   Jude Pérez MD  [address]  [place] [address]

## 2022-06-28 NOTE — PROGRESS NOTES
Service Date: 06/28/2022    HISTORY OF PRESENT ILLNESS:  Renny is a very nice 62-year-old gentleman that I met 1 year ago.  I take care of his father and his brother-in-law.  He is a referral from Dr. Alejandro Lewis.    Renny has a past medical history significant for mixed hyperlipidemia with elevated triglycerides and low HDL, hypothyroidism, GERD, erectile dysfunction, benign prostatic hypertrophy.  He is a lifelong nonsmoker, does not have diabetes mellitus.  He does have central obesity.    He has a history of substernal chest discomfort that radiates to his left shoulder occur at higher levels of activity such as mowing the lawn.  If he stops, he thinks it goes away in 5 minutes or less.  He has had no prolonged episodes.  When I saw him last year, he had undergone a stress test for which he was able to exercise 7 minutes of a standard Ramon protocol.  He did get 0.5 mm of ST-segment depression in the inferolateral leads.  The echo portion did appear to be normal.  He wanted a conservative course and did not want to go to the Cath Lab.  He has been started on a statin and recommendation to exercise 30 minutes 5 times a week and eat a predominantly plant-based diet.    He returns to follow up stating he thinks he is doing about the same.  He states he will have to stop mowing his lawn after he is into it for about 20 minutes.  If he hurries and goes too fast, he will get it earlier.  Again it is alleviated in about 5 minutes.    He has no lightheadedness, dizziness, syncope or near syncope.  No dyspnea on exertion, orthopnea or PND.  He does have ankle swelling, right leg worse than left, especially on long plane flights or if he is sitting at home working for prolonged periods of time.    ASSESSMENT AND PLAN:  Renny has chronic stable exertional angina.  We reviewed his stress test.  We talked again about what this is.  He asks about atherosclerotic regression, how we can check into that.  Overall he thinks  he is doing about the same as last year.    No symptoms to suggest heart failure or significant arrhythmia.    Cholesterol profile is excellent.  Total cholesterol is 116, HDL is 41, LDL is 42, triglycerides are 163.  This probably just reflects his recent diet.  He has normal thyroid function tests, does not have diabetes, does not drink alcohol.    His weight is 204 pounds, giving a body mass index of 32, and I have emphasized the importance of more consistent exercise and losing more weight.  This will help atherosclerotic regression, his anginal symptoms and his hypertriglyceridemia.  I will have him follow up with my ABDIAZIZ in 6 months.  I will see him back in a year.  If he should have any problems, I would be glad to see him sooner.    Thank you for allowing me to participate in his care.    Jude Pérez MD, [facility]        D: 2022   T: 2022   MT: ej    Name:     EARL CHRISTIANSONLori  MRN:      [id number]        Account:      [id number]   :      1960           Service Date: 2022       Document: [id number]

## 2022-07-02 ENCOUNTER — HEALTH MAINTENANCE LETTER (OUTPATIENT)
Age: 62
End: 2022-07-02

## 2022-09-06 DIAGNOSIS — E03.9 HYPOTHYROIDISM, UNSPECIFIED TYPE: ICD-10-CM

## 2022-09-07 DIAGNOSIS — I25.118 CORONARY ARTERY DISEASE OF NATIVE ARTERY OF NATIVE HEART WITH STABLE ANGINA PECTORIS (H): ICD-10-CM

## 2022-09-07 RX ORDER — METOPROLOL SUCCINATE 25 MG/1
25 TABLET, EXTENDED RELEASE ORAL DAILY
Qty: 90 TABLET | Refills: 3 | Status: SHIPPED | OUTPATIENT
Start: 2022-09-07 | End: 2023-06-06

## 2022-09-07 RX ORDER — ROSUVASTATIN CALCIUM 40 MG/1
40 TABLET, COATED ORAL DAILY
Qty: 90 TABLET | Refills: 3 | Status: SHIPPED | OUTPATIENT
Start: 2022-09-07 | End: 2023-06-06

## 2022-09-07 RX ORDER — LEVOTHYROXINE SODIUM 88 UG/1
TABLET ORAL
Qty: 90 TABLET | Refills: 0 | Status: SHIPPED | OUTPATIENT
Start: 2022-09-07 | End: 2022-11-29

## 2022-09-07 NOTE — TELEPHONE ENCOUNTER
TSH   Date Value Ref Range Status   05/03/2021 1.89 0.40 - 4.00 mU/L Final     Lab out of date.  Has appointment with Dr. Lewis in 2 months, 11/29/22.    Last VV Dr. Lewis was 6/18/21.    Please refill as appropriate.  Thank you,    Ayana Piper RN  [facility]

## 2022-09-08 ENCOUNTER — TRANSFERRED RECORDS (OUTPATIENT)
Dept: HEALTH INFORMATION MANAGEMENT | Facility: CLINIC | Age: 62
End: 2022-09-08

## 2022-11-17 ENCOUNTER — LAB REQUISITION (OUTPATIENT)
Dept: LAB | Facility: CLINIC | Age: 62
End: 2022-11-17
Payer: COMMERCIAL

## 2022-11-17 ENCOUNTER — TRANSFERRED RECORDS (OUTPATIENT)
Dept: FAMILY MEDICINE | Facility: CLINIC | Age: 62
End: 2022-11-17

## 2022-11-17 DIAGNOSIS — J34.89 OTHER SPECIFIED DISORDERS OF NOSE AND NASAL SINUSES: ICD-10-CM

## 2022-11-17 PROCEDURE — 87077 CULTURE AEROBIC IDENTIFY: CPT | Mod: ORL | Performed by: OTOLARYNGOLOGY

## 2022-11-20 LAB — BACTERIA SPEC CULT: ABNORMAL

## 2022-11-27 ASSESSMENT — ENCOUNTER SYMPTOMS
PARESTHESIAS: 0
HEMATOCHEZIA: 0
SORE THROAT: 0
WEAKNESS: 0
HEMATURIA: 0
PALPITATIONS: 0
HEADACHES: 0
ABDOMINAL PAIN: 0
ARTHRALGIAS: 0
HEARTBURN: 0
MYALGIAS: 0
DYSURIA: 0
DIZZINESS: 0
FREQUENCY: 1
NERVOUS/ANXIOUS: 0
NAUSEA: 0
DIARRHEA: 0
FEVER: 0
CHILLS: 0
CONSTIPATION: 0
SHORTNESS OF BREATH: 1
EYE PAIN: 0
JOINT SWELLING: 0
COUGH: 0

## 2022-11-29 ENCOUNTER — OFFICE VISIT (OUTPATIENT)
Dept: FAMILY MEDICINE | Facility: CLINIC | Age: 62
End: 2022-11-29
Payer: COMMERCIAL

## 2022-11-29 VITALS
SYSTOLIC BLOOD PRESSURE: 105 MMHG | HEIGHT: 67 IN | TEMPERATURE: 97.1 F | WEIGHT: 204 LBS | DIASTOLIC BLOOD PRESSURE: 68 MMHG | BODY MASS INDEX: 32.02 KG/M2 | OXYGEN SATURATION: 96 % | HEART RATE: 73 BPM | RESPIRATION RATE: 14 BRPM

## 2022-11-29 DIAGNOSIS — E03.9 HYPOTHYROIDISM, UNSPECIFIED TYPE: ICD-10-CM

## 2022-11-29 DIAGNOSIS — R97.20 ELEVATED PSA: ICD-10-CM

## 2022-11-29 DIAGNOSIS — K21.9 GASTROESOPHAGEAL REFLUX DISEASE WITHOUT ESOPHAGITIS: ICD-10-CM

## 2022-11-29 DIAGNOSIS — E78.2 MIXED HYPERCHOLESTEROLEMIA AND HYPERTRIGLYCERIDEMIA: ICD-10-CM

## 2022-11-29 DIAGNOSIS — D12.4 ADENOMATOUS POLYP OF DESCENDING COLON: ICD-10-CM

## 2022-11-29 DIAGNOSIS — Z00.00 ENCOUNTER FOR ANNUAL PHYSICAL EXAM: Primary | ICD-10-CM

## 2022-11-29 DIAGNOSIS — E55.9 VITAMIN D DEFICIENCY: ICD-10-CM

## 2022-11-29 DIAGNOSIS — Z23 NEED FOR VACCINATION: ICD-10-CM

## 2022-11-29 DIAGNOSIS — N40.0 BENIGN NON-NODULAR PROSTATIC HYPERPLASIA WITHOUT LOWER URINARY TRACT SYMPTOMS: ICD-10-CM

## 2022-11-29 DIAGNOSIS — I25.118 CORONARY ARTERY DISEASE OF NATIVE ARTERY OF NATIVE HEART WITH STABLE ANGINA PECTORIS (H): ICD-10-CM

## 2022-11-29 LAB
DEPRECATED CALCIDIOL+CALCIFEROL SERPL-MC: 29 UG/L (ref 20–75)
ERYTHROCYTE [DISTWIDTH] IN BLOOD BY AUTOMATED COUNT: 12.8 % (ref 10–15)
HCT VFR BLD AUTO: 45.1 % (ref 40–53)
HGB BLD-MCNC: 14.7 G/DL (ref 13.3–17.7)
MCH RBC QN AUTO: 28.8 PG (ref 26.5–33)
MCHC RBC AUTO-ENTMCNC: 32.6 G/DL (ref 31.5–36.5)
MCV RBC AUTO: 88 FL (ref 78–100)
PLATELET # BLD AUTO: 224 10E3/UL (ref 150–450)
RBC # BLD AUTO: 5.11 10E6/UL (ref 4.4–5.9)
WBC # BLD AUTO: 7.7 10E3/UL (ref 4–11)

## 2022-11-29 PROCEDURE — 85027 COMPLETE CBC AUTOMATED: CPT | Performed by: INTERNAL MEDICINE

## 2022-11-29 PROCEDURE — 90471 IMMUNIZATION ADMIN: CPT | Performed by: INTERNAL MEDICINE

## 2022-11-29 PROCEDURE — 80053 COMPREHEN METABOLIC PANEL: CPT | Performed by: INTERNAL MEDICINE

## 2022-11-29 PROCEDURE — 84443 ASSAY THYROID STIM HORMONE: CPT | Performed by: INTERNAL MEDICINE

## 2022-11-29 PROCEDURE — 80061 LIPID PANEL: CPT | Performed by: INTERNAL MEDICINE

## 2022-11-29 PROCEDURE — 82306 VITAMIN D 25 HYDROXY: CPT | Performed by: INTERNAL MEDICINE

## 2022-11-29 PROCEDURE — 90714 TD VACC NO PRESV 7 YRS+ IM: CPT | Performed by: INTERNAL MEDICINE

## 2022-11-29 PROCEDURE — 99396 PREV VISIT EST AGE 40-64: CPT | Mod: 25 | Performed by: INTERNAL MEDICINE

## 2022-11-29 PROCEDURE — 36415 COLL VENOUS BLD VENIPUNCTURE: CPT | Performed by: INTERNAL MEDICINE

## 2022-11-29 RX ORDER — MUPIROCIN 20 MG/G
OINTMENT TOPICAL
COMMUNITY
Start: 2022-11-17 | End: 2023-06-06

## 2022-11-29 RX ORDER — LEVOTHYROXINE SODIUM 88 UG/1
88 TABLET ORAL DAILY
Qty: 90 TABLET | Refills: 3 | Status: SHIPPED | OUTPATIENT
Start: 2022-11-29 | End: 2024-01-26

## 2022-11-29 RX ORDER — DOXYCYCLINE HYCLATE 100 MG
TABLET ORAL
COMMUNITY
Start: 2022-11-17 | End: 2023-06-06

## 2022-11-29 RX ORDER — CEFDINIR 300 MG/1
CAPSULE ORAL
COMMUNITY
Start: 2022-11-22 | End: 2023-06-06

## 2022-11-29 ASSESSMENT — PAIN SCALES - GENERAL: PAINLEVEL: NO PAIN (0)

## 2022-11-29 NOTE — LETTER
November 30, 2022      Darrell Darnell  [address]  [place] [address]        Dear ,    We are writing to inform you of your test results.    It was a pleasure seeing you for your physical examination.  I wanted to get back to you with your test results.  I have enclosed a copy for your review.       I am happy to report that your cbc or complete blood count is normal with no signs of anemia, leukemia or platelet abnormalities.  Your chemistry panel shows no signs of diabetes.  Your blood salts, kidney tests, liver tests, thyroid test, vitamin D level, and proteins are all fine.  The carbon dioxide level and anion gap are not significant.     Your total cholesterol is 128 with the normal range being below 200.  Your HDL or good cholesterol is 48 with the normal range being above 40.  Your LDL or bad cholesterol is 56 with the normal range being below 130.  These numbers are super.     I am happy to bring you this overall excellent report.  If you have any questions please call me.     Alejandro Lewis M.D.       Resulted Orders   CBC with platelets   Result Value Ref Range    WBC Count 7.7 4.0 - 11.0 10e3/uL    RBC Count 5.11 4.40 - 5.90 10e6/uL    Hemoglobin 14.7 13.3 - 17.7 g/dL    Hematocrit 45.1 40.0 - 53.0 %    MCV 88 78 - 100 fL    MCH 28.8 26.5 - 33.0 pg    MCHC 32.6 31.5 - 36.5 g/dL    RDW 12.8 10.0 - 15.0 %    Platelet Count 224 150 - 450 10e3/uL   Comprehensive metabolic panel   Result Value Ref Range    Sodium 138 136 - 145 mmol/L    Potassium 4.5 3.4 - 5.3 mmol/L    Chloride 103 98 - 107 mmol/L    Carbon Dioxide (CO2) 19 (L) 22 - 29 mmol/L    Anion Gap 16 (H) 7 - 15 mmol/L    Urea Nitrogen 12.7 8.0 - 23.0 mg/dL    Creatinine 0.98 0.67 - 1.17 mg/dL    Calcium 9.5 8.8 - 10.2 mg/dL    Glucose 95 70 - 99 mg/dL    Alkaline Phosphatase 83 40 - 129 U/L    AST 29 10 - 50 U/L    ALT 19 10 - 50 U/L    Protein Total 7.0 6.4 - 8.3 g/dL    Albumin 4.1 3.5 - 5.2 g/dL    Bilirubin Total 0.4 <=1.2 mg/dL     GFR Estimate 87 >60 mL/min/1.73m2      Comment:      Effective December 21, 2021 eGFRcr in adults is calculated using the 2021 CKD-EPI creatinine equation which includes age and gender (Cydney quinteros al., [place], DOI: 10.1056/GMDEji4121793)   Lipid panel reflex to direct LDL Fasting   Result Value Ref Range    Cholesterol 128 <200 mg/dL    Triglycerides 122 <150 mg/dL    Direct Measure HDL 48 >=40 mg/dL    LDL Cholesterol Calculated 56 <=100 mg/dL    Non HDL Cholesterol 80 <130 mg/dL    Narrative    Cholesterol  Desirable:  <200 mg/dL    Triglycerides  Normal:  Less than 150 mg/dL  Borderline High:  150-199 mg/dL  High:  200-499 mg/dL  Very High:  Greater than or equal to 500 mg/dL    Direct Measure HDL  Female:  Greater than or equal to 50 mg/dL   Male:  Greater than or equal to 40 mg/dL    LDL Cholesterol  Desirable:  <100mg/dL  Above Desirable:  100-129 mg/dL   Borderline High:  130-159 mg/dL   High:  160-189 mg/dL   Very High:  >= 190 mg/dL    Non HDL Cholesterol  Desirable:  130 mg/dL  Above Desirable:  130-159 mg/dL  Borderline High:  160-189 mg/dL  High:  190-219 mg/dL  Very High:  Greater than or equal to 220 mg/dL   TSH with free T4 reflex   Result Value Ref Range    TSH 3.19 0.30 - 4.20 uIU/mL   Vitamin D Deficiency   Result Value Ref Range    Vitamin D, Total (25-Hydroxy) 29 20 - 75 ug/L    Narrative    Season, race, dietary intake, and treatment affect the concentration of 25-hydroxy-Vitamin D. Values may decrease during winter months and increase during summer months. Values 20-29 ug/L may indicate Vitamin D insufficiency and values <20 ug/L may indicate Vitamin D deficiency.    Vitamin D determination is routinely performed by an immunoassay specific for 25 hydroxyvitamin D3.  If an individual is on vitamin D2(ergocalciferol) supplementation, please specify 25 OH vitamin D2 and D3 level determination by LCMSMS test [id number].         If you have any questions or concerns, please call the clinic at the number  listed above.       Sincerely,      Alejandro Lewis MD

## 2022-11-29 NOTE — NURSING NOTE
Prior to immunization administration, verified patients identity using patient s name and date of birth. Please see Immunization Activity for additional information.     Screening Questionnaire for Adult Immunization    Are you sick today?   No   Do you have allergies to medications, food, a vaccine component or latex?   No   Have you ever had a serious reaction after receiving a vaccination?   No   Do you have a long-term health problem with heart, lung, kidney, or metabolic disease (e.g., diabetes), asthma, a blood disorder, no spleen, complement component deficiency, a cochlear implant, or a spinal fluid leak?  Are you on long-term aspirin therapy?   Yes   Do you have cancer, leukemia, HIV/AIDS, or any other immune system problem?   No   Do you have a parent, brother, or sister with an immune system problem?   No   In the past 3 months, have you taken medications that affect  your immune system, such as prednisone, other steroids, or anticancer drugs; drugs for the treatment of rheumatoid arthritis, Crohn s disease, or psoriasis; or have you had radiation treatments?   No   Have you had a seizure, or a brain or other nervous system problem?   No   During the past year, have you received a transfusion of blood or blood    products, or been given immune (gamma) globulin or antiviral drug?   No   For women: Are you pregnant or is there a chance you could become       pregnant during the next month?      Have you received any vaccinations in the past 4 weeks?   No     Immunization questionnaire was positive for at least one answer.  Notified provider.        Per orders of Dr. Lewis, injection of Td given by Kayleigh Paula MA. Patient instructed to remain in clinic for 15 minutes afterwards, and to report any adverse reaction to me immediately.       Screening performed by Kayleigh Paula MA on 11/29/2022 at 12:00 PM.

## 2022-11-29 NOTE — PROGRESS NOTES
SUBJECTIVE:   CC: Darrell is an 62 year old who presents for preventative health visit.    Overall he is doing well but does have a few issues.  He has presumed coronary disease based on symptoms and a positive stress test for which she sees cardiology regularly.  He can get exertional chest discomfort but this has not changed in over a year.  He was seen by cardiology who made no change recommendations.    He does have an elevated PSA with BPH for which he has had close urology follow-up and I reviewed the recent note where his PSA was down to 4.  They recommended a follow-up in 1 year.    He otherwise is doing well.  He exercises some.  His weight is an issue.  His acid reflux is controlled.  He has had regular ENT follow-up for nasal issue.    He is  and works and has 2 grown kids.    Patient has been advised of split billing requirements and indicates understanding: Yes     Healthy Habits:     Getting at least 3 servings of Calcium per day:  Yes    Bi-annual eye exam:  Yes    Dental care twice a year:  Yes    Sleep apnea or symptoms of sleep apnea:  Daytime drowsiness    Diet:  Carbohydrate counting    Frequency of exercise:  2-3 days/week    Duration of exercise:  15-30 minutes    Taking medications regularly:  Yes    Medication side effects:  None    PHQ-2 Total Score: 0            Today's PHQ-2 Score:   PHQ-2 ( 1999 [employer]) 11/27/2022   Q1: Little interest or pleasure in doing things 0   Q2: Feeling down, depressed or hopeless 0   PHQ-2 Score 0   PHQ-2 Total Score (12-17 Years)- Positive if 3 or more points; Administer PHQ-A if positive -   Q1: Little interest or pleasure in doing things Not at all   Q2: Feeling down, depressed or hopeless Not at all   PHQ-2 Score 0       Have you ever done Advance Care Planning? (For example, a Health Directive, POLST, or a discussion with a medical provider or your loved ones about your wishes): No, advance care planning information given to patient to review.  Patient  declined advance care planning discussion at this time.    Social History     Tobacco Use     Smoking status: Never     Smokeless tobacco: Never   Substance Use Topics     Alcohol use: Never     If you drink alcohol do you typically have >3 drinks per day or >7 drinks per week? Not applicable               Past Medical History:      Past Medical History:   Diagnosis Date     Adenomatous polyp of transverse colon 08/2021    fu 7 years     BPH (benign prostatic hyperplasia) 2016    Dr. Gonzalez     Burning sensation of throat 2021    egd nl,     Chest pain 01/2014    neg est echo     Coronary artery disease of native artery of native heart with stable angina pectoris (H) 07/20/2021    based on symptoms 5/21 and positive est, then seen by cards and med [facility]     Elevated PSA 2016    Dr. Gonzalez, bx neg, seen by Dr Brown 2022, had mri 2021     Erectile dysfunction     Dr. Gonzalez     Gastritis      Hypercholesteremia      Hypothyroid 2007     Normal colonoscopy 2010     Vitamin D deficiency 2009    Vitamin D2/D3 of 19             Past Surgical History:      Past Surgical History:   Procedure Laterality Date     ABDOMEN SURGERY  01/08/2021    Incisional Hernia     BIOPSY  09/2015    Prostate. Later PSA results were found good.     COLONOSCOPY  2010    No issue found.     HERNIA REPAIR      Incisional Hernia     LAPAROSCOPIC CHOLECYSTECTOMY N/A 11/16/2015    Procedure: LAPAROSCOPIC CHOLECYSTECTOMY;  Surgeon: Sivakumar Burr MD;  Location: [facility]     LAPAROSCOPIC HERNIORRHAPHY VENTRAL N/A 01/08/2021    Procedure: LAPAROSCOPIC VENTRAL HERNIA REPAIR WITH MESH;  Surgeon: Sivakumar Burr MD;  Location:  OR     PROSTATE SURGERY      Trus BX             Social History:     Social History     Socioeconomic History     Marital status:      Spouse name: Not on file     Number of children: 2     Years of education: Not on file     Highest education level: Not on file   Occupational History     Occupation: quality       Employer: [employer]     Employer: [employer]   Tobacco Use     Smoking status: Never     Smokeless tobacco: Never   Substance and Sexual Activity     Alcohol use: Never     Drug use: Never     Sexual activity: Yes     Partners: Female     Birth control/protection: Abstinence     Comment: New medicines have affected my general routine of life   Other Topics Concern     Parent/sibling w/ CABG, MI or angioplasty before 65F 55M? No   Social History Narrative     Not on file     Social Determinants of Health     Financial Resource Strain: Not on file   Food Insecurity: Not on file   Transportation Needs: Not on file   Physical Activity: Not on file   Stress: Not on file   Social Connections: Not on file   Intimate Partner Violence: Not on file   Housing Stability: Not on file             Family History:   reviewed         Allergies:     Allergies   Allergen Reactions     Dust Mites      Sneezing, water eyes             Medications:     Current Outpatient Medications   Medication Sig Dispense Refill     aspirin (ASA) 81 MG EC tablet Take 1 tablet (81 mg) by mouth daily       cefdinir (OMNICEF) 300 MG capsule        doxycycline hyclate (VIBRA-TABS) 100 MG tablet TAKE 1 TABLET BY MOUTH TWICE DAILY FOR 14 DAYS       famotidine (PEPCID) 20 MG tablet 1 TABLET 30 MINUTES BEFORE BEDTIME. ONCE A DAY ORALLY FOR 90 DAYS       finasteride (PROSCAR) 5 MG tablet TAKE 1 TABLET(5 MG) BY MOUTH DAILY 90 tablet 2     levothyroxine (SYNTHROID/LEVOTHROID) 88 MCG tablet Take 1 tablet (88 mcg) by mouth daily 90 tablet 3     melatonin 1 MG CAPS        metoprolol succinate ER (TOPROL XL) 25 MG 24 hr tablet Take 1 tablet (25 mg) by mouth daily 90 tablet 3     mupirocin (BACTROBAN) 2 % external ointment APPLY 1 APPLICATION INTRANASALLY THREE TIMES DAILY FOR 14 DAYS       rosuvastatin (CRESTOR) 40 MG tablet Take 1 tablet (40 mg) by mouth daily 90 tablet 3     tamsulosin (FLOMAX) 0.4 MG capsule TAKE 1 CAPSULE(0.4 MG) BY MOUTH DAILY 30  "MINUTES AFTER THE EVENING MEAL 90 capsule 2     Vitamin D (Cholecalciferol) 25 MCG (1000 UT) TABS Take 2,000 Units by mouth Nature made D 3                 Review of Systems:   The 10 point Review of Systems is negative other than noted in the HPI           Physical Exam:   Blood pressure 105/68, pulse 73, temperature 97.1  F (36.2  C), resp. rate 14, height 1.702 m (5' 7\"), weight 92.5 kg (204 lb), SpO2 96 %.    Exam:  Constitutional: healthy appearing, alert and in no distress  Heent: Normocephalic. Head without obvious masses or lesions. PERRLDC, EOMI. Mouth exam within normal limits: tongue, mucous membranes, posterior pharynx all normal, no lesions or abnormalities seen.  Tm's and canals within normal limits bilaterally. Neck supple, no nuchal rigidity or masses. No supraclavicular, or cervical adenopathy. Thyroid symmetric, no masses.  Cardiovascular: Regular rate and rhythm, no murmer, rub or gallops.  JVP not elevated, no edema.  Carotids within normal limits bilaterally, no bruits.  Respiratory: Normal respiratory effort.  Lungs clear, normal flow, no wheezing or crackles.  Breasts: Normal bilaterally.  No masses or lesions.  Nipples within normal limites.  No axillary lesions or nodes.  Gastrointestinal: Normal active bowel sounds.   Soft, not tender, no masses, guarding or rebound.  No hepatosplenomegaly.   Genitourinary: Rectal not done  Musculoskeletal: extremities normal, no gross deformities noted.  Skin: no suspicious lesions or rashes   Neurologic: Mental status within normal limits.  Speech fluent.  No gross motor abnormalities and gait intact.  Psychiatric: mentation appears normal and affect normal.         Data:   Labs sent        Assessment:   1. Normal complete physical exam  2. Cad, ongoing angina, not unstable, to call with worsening symptoms  3. Colon polyp, follow up as noted  4. Vit d defic, follow up labs  5. Elevated cholesterol, follow up labs  6. Obesity, weight loss  7. Elevated psa, " benign prostatic hypertrophy, follow up urol  8. Hypothyroidism, follow up tsh  9. hcm         Plan:   Flu shot  Letter with labs  Exercise, diet and weight  Loss  Call if changes      Alejandro Lewis M.D.

## 2022-11-30 LAB
ALBUMIN SERPL BCG-MCNC: 4.1 G/DL (ref 3.5–5.2)
ALP SERPL-CCNC: 83 U/L (ref 40–129)
ALT SERPL W P-5'-P-CCNC: 19 U/L (ref 10–50)
ANION GAP SERPL CALCULATED.3IONS-SCNC: 16 MMOL/L (ref 7–15)
AST SERPL W P-5'-P-CCNC: 29 U/L (ref 10–50)
BILIRUB SERPL-MCNC: 0.4 MG/DL
BUN SERPL-MCNC: 12.7 MG/DL (ref 8–23)
CALCIUM SERPL-MCNC: 9.5 MG/DL (ref 8.8–10.2)
CHLORIDE SERPL-SCNC: 103 MMOL/L (ref 98–107)
CHOLEST SERPL-MCNC: 128 MG/DL
CREAT SERPL-MCNC: 0.98 MG/DL (ref 0.67–1.17)
DEPRECATED HCO3 PLAS-SCNC: 19 MMOL/L (ref 22–29)
GFR SERPL CREATININE-BSD FRML MDRD: 87 ML/MIN/1.73M2
GLUCOSE SERPL-MCNC: 95 MG/DL (ref 70–99)
HDLC SERPL-MCNC: 48 MG/DL
LDLC SERPL CALC-MCNC: 56 MG/DL
NONHDLC SERPL-MCNC: 80 MG/DL
POTASSIUM SERPL-SCNC: 4.5 MMOL/L (ref 3.4–5.3)
PROT SERPL-MCNC: 7 G/DL (ref 6.4–8.3)
SODIUM SERPL-SCNC: 138 MMOL/L (ref 136–145)
TRIGL SERPL-MCNC: 122 MG/DL
TSH SERPL DL<=0.005 MIU/L-ACNC: 3.19 UIU/ML (ref 0.3–4.2)

## 2022-11-30 NOTE — RESULT ENCOUNTER NOTE
It was a pleasure seeing you for your physical examination.  I wanted to get back to you with your test results.  I have enclosed a copy for your review.      I am happy to report that your cbc or complete blood count is normal with no signs of anemia, leukemia or platelet abnormalities.  Your chemistry panel shows no signs of diabetes.  Your blood salts, kidney tests, liver tests, thyroid test, vitamin D level, and proteins are all fine.  The carbon dioxide level and anion gap are not significant.    Your total cholesterol is 128 with the normal range being below 200.  Your HDL or good cholesterol is 48 with the normal range being above 40.  Your LDL or bad cholesterol is 56 with the normal range being below 130.  These numbers are super.    I am happy to bring you this overall excellent report.  If you have any questions please call me.    Alejandro Lewis M.D.

## 2022-12-04 DIAGNOSIS — R39.15 URGENCY OF URINATION: ICD-10-CM

## 2022-12-04 DIAGNOSIS — R97.20 ELEVATED PROSTATE SPECIFIC ANTIGEN (PSA): ICD-10-CM

## 2022-12-04 DIAGNOSIS — N40.1 BENIGN PROSTATIC HYPERPLASIA WITH WEAK URINARY STREAM: ICD-10-CM

## 2022-12-04 DIAGNOSIS — R39.12 BENIGN PROSTATIC HYPERPLASIA WITH WEAK URINARY STREAM: ICD-10-CM

## 2022-12-04 DIAGNOSIS — R33.9 URINARY RETENTION: ICD-10-CM

## 2022-12-05 RX ORDER — TAMSULOSIN HYDROCHLORIDE 0.4 MG/1
CAPSULE ORAL
Qty: 90 CAPSULE | Refills: 2 | OUTPATIENT
Start: 2022-12-05

## 2023-01-07 ENCOUNTER — HEALTH MAINTENANCE LETTER (OUTPATIENT)
Age: 63
End: 2023-01-07

## 2023-03-21 ENCOUNTER — TRANSFERRED RECORDS (OUTPATIENT)
Dept: HEALTH INFORMATION MANAGEMENT | Facility: CLINIC | Age: 63
End: 2023-03-21

## 2023-06-05 ENCOUNTER — LAB (OUTPATIENT)
Dept: LAB | Facility: CLINIC | Age: 63
End: 2023-06-05
Attending: INTERNAL MEDICINE
Payer: COMMERCIAL

## 2023-06-05 DIAGNOSIS — I25.118 CORONARY ARTERY DISEASE OF NATIVE ARTERY OF NATIVE HEART WITH STABLE ANGINA PECTORIS (H): ICD-10-CM

## 2023-06-05 LAB
ALT SERPL W P-5'-P-CCNC: 22 U/L (ref 10–50)
CHOLEST SERPL-MCNC: 125 MG/DL
HDLC SERPL-MCNC: 40 MG/DL
LDLC SERPL CALC-MCNC: 54 MG/DL
NONHDLC SERPL-MCNC: 85 MG/DL
TRIGL SERPL-MCNC: 155 MG/DL

## 2023-06-05 PROCEDURE — 36415 COLL VENOUS BLD VENIPUNCTURE: CPT

## 2023-06-05 PROCEDURE — 80061 LIPID PANEL: CPT

## 2023-06-05 PROCEDURE — 84460 ALANINE AMINO (ALT) (SGPT): CPT

## 2023-06-06 ENCOUNTER — OFFICE VISIT (OUTPATIENT)
Dept: CARDIOLOGY | Facility: CLINIC | Age: 63
End: 2023-06-06
Attending: INTERNAL MEDICINE
Payer: COMMERCIAL

## 2023-06-06 VITALS
SYSTOLIC BLOOD PRESSURE: 121 MMHG | WEIGHT: 212 LBS | DIASTOLIC BLOOD PRESSURE: 74 MMHG | BODY MASS INDEX: 33.27 KG/M2 | OXYGEN SATURATION: 98 % | HEIGHT: 67 IN | HEART RATE: 62 BPM

## 2023-06-06 DIAGNOSIS — E78.2 MIXED HYPERCHOLESTEROLEMIA AND HYPERTRIGLYCERIDEMIA: ICD-10-CM

## 2023-06-06 DIAGNOSIS — E66.09 CLASS 1 OBESITY DUE TO EXCESS CALORIES WITH SERIOUS COMORBIDITY AND BODY MASS INDEX (BMI) OF 33.0 TO 33.9 IN ADULT: ICD-10-CM

## 2023-06-06 DIAGNOSIS — R60.0 PERIPHERAL EDEMA: ICD-10-CM

## 2023-06-06 DIAGNOSIS — E03.9 HYPOTHYROIDISM, UNSPECIFIED TYPE: ICD-10-CM

## 2023-06-06 DIAGNOSIS — R94.39 ABNORMAL CARDIOVASCULAR STRESS TEST: ICD-10-CM

## 2023-06-06 DIAGNOSIS — I25.118 CORONARY ARTERY DISEASE OF NATIVE ARTERY OF NATIVE HEART WITH STABLE ANGINA PECTORIS (H): Primary | ICD-10-CM

## 2023-06-06 DIAGNOSIS — E66.811 CLASS 1 OBESITY DUE TO EXCESS CALORIES WITH SERIOUS COMORBIDITY AND BODY MASS INDEX (BMI) OF 33.0 TO 33.9 IN ADULT: ICD-10-CM

## 2023-06-06 PROCEDURE — 99215 OFFICE O/P EST HI 40 MIN: CPT | Performed by: INTERNAL MEDICINE

## 2023-06-06 RX ORDER — METOPROLOL SUCCINATE 25 MG/1
25 TABLET, EXTENDED RELEASE ORAL DAILY
Qty: 90 TABLET | Refills: 3 | Status: SHIPPED | OUTPATIENT
Start: 2023-06-06 | End: 2024-07-11

## 2023-06-06 RX ORDER — ROSUVASTATIN CALCIUM 40 MG/1
40 TABLET, COATED ORAL DAILY
Qty: 90 TABLET | Refills: 3 | Status: SHIPPED | OUTPATIENT
Start: 2023-06-06 | End: 2024-07-12

## 2023-06-06 NOTE — LETTER
6/6/2023    Alejandro Lewis MD  [address]  [place] [address]    RE: Sarkis OLIVIA Mann       Dear Colleague,     I had the pleasure of seeing Sarkis Darnell in the [facility].  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       Renny has a past medical history significant for mixed hyperlipidemia with elevated triglycerides and low HDL, impaired fasting glucose, hypothyroidism, GERD, erectile dysfunction, benign prostatic hypertrophy with elevated PSA, central obesity all consistent with metabolic syndrome..  He is a lifelong nonsmoker.     He has a history of substernal chest discomfort that radiates to his left shoulder occur at higher levels of activity such as mowing the lawn.  If he stops, he thinks it goes away in 5 minutes or less.  He has had no prolonged episodes.  In 2021 he underwent a stress echo for which he was able to exercise 7 minutes of a standard Ramon protocol.  He did get his chest pain and 0.5 mm of ST-segment depression in the inferolateral leads.  The echo portion did appear to be normal.  He wanted a conservative course and did not want to go to the Cath Lab.  He has been started on a statin and recommendation to exercise 30 minutes 5 times a week and eat a predominantly plant-based diet.     He returns to clinic stating he thinks he might be doing somewhat better.  He did have pretty consistent discomfort shoveling snow this winter but thinks he is doing better mowing his lawn.  Unfortunately he is traveling more at work and has less control over his diet and exercise and has gained 15 pounds of weight. He states he will have to stop mowing his lawn after he is into it for about 20 minutes.  If he hurries and goes too fast, he will get it earlier.  Again it is alleviated in about 5 minutes.  He has no rest episodes.     He has no lightheadedness, dizziness, syncope or near syncope.  No dyspnea on  exertion, orthopnea or PND.  He does have ankle swelling, right leg worse than left, especially on long plane flights or if he is sitting or standing for prolonged periods of time.     ASSESSMENT AND PLAN:  Renny has chronic stable exertional angina.  We reviewed his stress test.  We talked again about what this is.  He asks about atherosclerotic regression, how we can check into that.  Overall he thinks he is doing about the same or somewhat better  last year.  He is still not interested in a coronary angiogram but is interested in the CT angiogram.  I will order this and follow-up.     No symptoms to suggest heart failure or significant arrhythmia.     As would be expected with his weight gain and decreased exercise and eating out more frequently he has had a backslide in his cholesterol profile.  Cholesterol is 125 and HDL of 40 LDL of 54 and triglycerides of 155.  We talked about the importance of getting back to his improved lifestyle including low carbohydrate diet, regular exercise and weight loss.  We talked about adding Zetia to try to drive LDL lower in an effort to effect atherosclerotic regression.  At this time he wants to try to improve lifestyle.     As stated weight is up body mass index is now 33.    We also talked about his ankle edema right leg worse than left.  We talked about the fact that his dependent edema and that he needs to watch the salt, exercise more and elevate his legs.  I have also recommended if he has a long day of standing at work that he should use compression stockings.  He does have mild varicose veins.  If this continues to be too troublesome I will refer him onto our venous cardiologist.    I will follow-up on the CT angiogram and otherwise see him next year.  In the interim I will also check a fasting glucose, LP(a) and C-reactive protein.     Thank you for allowing me to participate in his care.     Jude Pérez MD, [facility]          Today's clinic visit  entailed:  Review of the result(s) of each unique test - Stress test, lab work  Ordering of each unique test  Prescription drug management  45 minutes spent by me on the date of the encounter doing chart review, history and exam, documentation and further activities per the note  Provider  Link to MDM Help Grid     The level of medical decision making during this visit was of moderate complexity.      Orders Placed This Encounter   Procedures    Lipoprotein (a)    CRP cardiac risk    Basic metabolic panel    Basic metabolic panel    Lipid Profile    ALT    Follow-Up with Cardiology       Orders Placed This Encounter   Medications    metoprolol succinate ER (TOPROL XL) 25 MG 24 hr tablet     Sig: Take 1 tablet (25 mg) by mouth daily     Dispense:  90 tablet     Refill:  3    rosuvastatin (CRESTOR) 40 MG tablet     Sig: Take 1 tablet (40 mg) by mouth daily     Dispense:  90 tablet     Refill:  3       Medications Discontinued During This Encounter   Medication Reason    cefdinir (OMNICEF) 300 MG capsule     doxycycline hyclate (VIBRA-TABS) 100 MG tablet     mupirocin (BACTROBAN) 2 % external ointment     metoprolol succinate ER (TOPROL XL) 25 MG 24 hr tablet Reorder (No AVS / No eCancel)    rosuvastatin (CRESTOR) 40 MG tablet Reorder (No AVS / No eCancel)         Encounter Diagnoses   Name Primary?    Coronary artery disease of native artery of native heart with stable angina pectoris (H) Yes    Mixed hypercholesterolemia and hypertriglyceridemia     Abnormal cardiovascular stress test     Hypothyroidism, unspecified type     Class 1 obesity due to excess calories with serious comorbidity and body mass index (BMI) of 33.0 to 33.9 in adult     Peripheral edema        CURRENT MEDICATIONS:  Current Outpatient Medications   Medication Sig Dispense Refill    aspirin (ASA) 81 MG EC tablet Take 1 tablet (81 mg) by mouth daily      famotidine (PEPCID) 20 MG tablet 1 TABLET 30 MINUTES BEFORE BEDTIME. ONCE A DAY ORALLY FOR 90  DAYS      finasteride (PROSCAR) 5 MG tablet TAKE 1 TABLET(5 MG) BY MOUTH DAILY 90 tablet 2    levothyroxine (SYNTHROID/LEVOTHROID) 88 MCG tablet Take 1 tablet (88 mcg) by mouth daily 90 tablet 3    melatonin 1 MG CAPS       metoprolol succinate ER (TOPROL XL) 25 MG 24 hr tablet Take 1 tablet (25 mg) by mouth daily 90 tablet 3    rosuvastatin (CRESTOR) 40 MG tablet Take 1 tablet (40 mg) by mouth daily 90 tablet 3    tamsulosin (FLOMAX) 0.4 MG capsule TAKE 1 CAPSULE(0.4 MG) BY MOUTH DAILY 30 MINUTES AFTER THE EVENING MEAL 90 capsule 2    Vitamin D (Cholecalciferol) 25 MCG (1000 UT) TABS Take 2,000 Units by mouth Nature made D 3         ALLERGIES     Allergies   Allergen Reactions    Dust Mites      Sneezing, water eyes       PAST MEDICAL HISTORY:  Past Medical History:   Diagnosis Date    Adenomatous polyp of transverse colon 08/2021    fu 7 years    BPH (benign prostatic hyperplasia) 2016    Dr. Gonzalez    Burning sensation of throat 2021    egd nl,    Chest pain 01/2014    neg est echo    Coronary artery disease of native artery of native heart with stable angina pectoris (H) 07/20/2021    based on symptoms 5/21 and positive est, then seen by [facility] and med mgmt    Elevated PSA 2016    Dr. Gonzalez, bx neg, seen by Dr Brown 2022, had mri 2021    Erectile dysfunction     Dr. Gonzalez    Gastritis     Hypercholesteremia     Hypothyroid 2007    Normal colonoscopy 2010    Vitamin D deficiency 2009    Vitamin D2/D3 of 19       PAST SURGICAL HISTORY:  Past Surgical History:   Procedure Laterality Date    ABDOMEN SURGERY  01/08/2021    Incisional Hernia    BIOPSY  09/2015    Prostate. Later PSA results were found good.    COLONOSCOPY  2010    No issue found.    HERNIA REPAIR      Incisional Hernia    LAPAROSCOPIC CHOLECYSTECTOMY N/A 11/16/2015    Procedure: LAPAROSCOPIC CHOLECYSTECTOMY;  Surgeon: Sivakumar Burr MD;  Location: [facility]    LAPAROSCOPIC HERNIORRHAPHY VENTRAL N/A 01/08/2021    Procedure: LAPAROSCOPIC VENTRAL  HERNIA REPAIR WITH MESH;  Surgeon: Sivakumar Burr MD;  Location: [facility] OR    PROSTATE SURGERY      Trus BX       FAMILY HISTORY:  Family History   Problem Relation Age of Onset    C.A.D. Mother     Arthritis Mother     Cancer Mother         ovarian    Coronary Artery Disease Mother         Angina, and Congestive heart failure    Other Cancer Mother         Ovarian    Osteoporosis Mother         After 70 years of age    Thyroid Disease Mother         Found low when 70+    Coronary Artery Disease Father         Angina    Hyperlipidemia Father     Hyperlipidemia Brother     Hypertension Brother     No Known Problems Sister     Cancer Maternal Grandfather         esophageal cancer    Other Cancer Maternal Grandfather         Esophagus    Cancer Paternal Grandmother         liver cancer    Other Cancer Paternal Grandmother         Liver    Cancer Paternal Grandfather         throat cancer    Other Cancer Paternal Grandfather         Throat    Hypertension Brother     Thyroid Disease Brother         Found low when 40+       SOCIAL HISTORY:  Social History     Socioeconomic History    Marital status:      Spouse name: None    Number of children: 2    Years of education: None    Highest education level: None   Occupational History    Occupation: quality engineer     Employer: [employer]     Employer: [employer]   Tobacco Use    Smoking status: Never    Smokeless tobacco: Never   Substance and Sexual Activity    Alcohol use: Never    Drug use: Never    Sexual activity: Yes     Partners: Female     Birth control/protection: Abstinence     Comment: New medicines have affected my general routine of life   Other Topics Concern    Parent/sibling w/ CABG, MI or angioplasty before 65F 55M? No       Review of Systems:  Skin:  Negative       Eyes:  Negative for glasses    ENT:  Negative      Respiratory:  Positive for dyspnea on exertion     Cardiovascular:  palpitations;Negative;lightheadedness;dizziness;fatigue chest  "pain;Positive for;edema chest pain: burning pain with exertion, improved since last year, edema: bilateral, more on the right side  Gastroenterology: Negative      Genitourinary:  Negative      Musculoskeletal:  Positive for back pain chronic low back pain  Neurologic:  Negative      Psychiatric:  Negative      Heme/Lymph/Imm:  Positive for allergies    Endocrine:  Positive for thyroid disorder      Physical Exam:  Vitals: /74 (BP Location: Right arm, Patient Position: Sitting)   Pulse 62   Ht 1.702 m (5' 7\")   Wt 96.2 kg (212 lb)   SpO2 98%   BMI 33.20 kg/m      Constitutional:  cooperative, alert and oriented, well developed, well nourished, in no acute distress obese      Skin:  warm and dry to the touch, no apparent skin lesions or masses noted          Head:  normocephalic, no masses or lesions        Eyes:  pupils equal and round, conjunctivae and lids unremarkable, sclera white, no xanthalasma, EOMS intact, no nystagmus        Lymph:      ENT:  no pallor or cyanosis, dentition good        Neck:  carotid pulses are full and equal bilaterally;no carotid bruit        Respiratory:  normal breath sounds, clear to auscultation, normal A-P diameter, normal symmetry, normal respiratory excursion, no use of accessory muscles         Cardiac: regular rhythm;no murmurs, gallops or rubs detected                pulses full and equal                                        GI:    obese      Extremities and Muscular Skeletal:  no edema;no spinal abnormalities noted;normal muscle strength and tone   varicose vein RLE edema;trace        Neurological:  no gross motor deficits        Psych:  affect appropriate, oriented to time, person and place        CC  Jude Pérez MD  [address]  [place] [address]      Thank you for allowing me to participate in the care of your patient.      Sincerely,     Jude Pérez MD     [facility]  "

## 2023-06-06 NOTE — PROGRESS NOTES
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       Renny has a past medical history significant for mixed hyperlipidemia with elevated triglycerides and low HDL, impaired fasting glucose, hypothyroidism, GERD, erectile dysfunction, benign prostatic hypertrophy with elevated PSA, central obesity all consistent with metabolic syndrome..  He is a lifelong nonsmoker.     He has a history of substernal chest discomfort that radiates to his left shoulder occur at higher levels of activity such as mowing the lawn.  If he stops, he thinks it goes away in 5 minutes or less.  He has had no prolonged episodes.  In 2021 he underwent a stress echo for which he was able to exercise 7 minutes of a standard Ramon protocol.  He did get his chest pain and 0.5 mm of ST-segment depression in the inferolateral leads.  The echo portion did appear to be normal.  He wanted a conservative course and did not want to go to the Cath Lab.  He has been started on a statin and recommendation to exercise 30 minutes 5 times a week and eat a predominantly plant-based diet.     He returns to clinic stating he thinks he might be doing somewhat better.  He did have pretty consistent discomfort shoveling snow this winter but thinks he is doing better mowing his lawn.  Unfortunately he is traveling more at work and has less control over his diet and exercise and has gained 15 pounds of weight. He states he will have to stop mowing his lawn after he is into it for about 20 minutes.  If he hurries and goes too fast, he will get it earlier.  Again it is alleviated in about 5 minutes.  He has no rest episodes.     He has no lightheadedness, dizziness, syncope or near syncope.  No dyspnea on exertion, orthopnea or PND.  He does have ankle swelling, right leg worse than left, especially on long plane flights or if he is sitting or standing for prolonged periods of time.     ASSESSMENT AND  PLAN:  Renny has chronic stable exertional angina.  We reviewed his stress test.  We talked again about what this is.  He asks about atherosclerotic regression, how we can check into that.  Overall he thinks he is doing about the same or somewhat better  last year.  He is still not interested in a coronary angiogram but is interested in the CT angiogram.  I will order this and follow-up.     No symptoms to suggest heart failure or significant arrhythmia.     As would be expected with his weight gain and decreased exercise and eating out more frequently he has had a backslide in his cholesterol profile.  Cholesterol is 125 and HDL of 40 LDL of 54 and triglycerides of 155.  We talked about the importance of getting back to his improved lifestyle including low carbohydrate diet, regular exercise and weight loss.  We talked about adding Zetia to try to drive LDL lower in an effort to effect atherosclerotic regression.  At this time he wants to try to improve lifestyle.     As stated weight is up body mass index is now 33.    We also talked about his ankle edema right leg worse than left.  We talked about the fact that his dependent edema and that he needs to watch the salt, exercise more and elevate his legs.  I have also recommended if he has a long day of standing at work that he should use compression stockings.  He does have mild varicose veins.  If this continues to be too troublesome I will refer him onto our venous cardiologist.    I will follow-up on the CT angiogram and otherwise see him next year.  In the interim I will also check a fasting glucose, LP(a) and C-reactive protein.     Thank you for allowing me to participate in his care.     Jude Pérez MD, [facility]          Today's clinic visit entailed:  Review of the result(s) of each unique test - Stress test, lab work  Ordering of each unique test  Prescription drug management  45 minutes spent by me on the date of the encounter doing chart review,  history and exam, documentation and further activities per the note  Provider  Link to MDM Help Grid     The level of medical decision making during this visit was of moderate complexity.      Orders Placed This Encounter   Procedures     Lipoprotein (a)     CRP cardiac risk     Basic metabolic panel     Basic metabolic panel     Lipid Profile     ALT     Follow-Up with Cardiology       Orders Placed This Encounter   Medications     metoprolol succinate ER (TOPROL XL) 25 MG 24 hr tablet     Sig: Take 1 tablet (25 mg) by mouth daily     Dispense:  90 tablet     Refill:  3     rosuvastatin (CRESTOR) 40 MG tablet     Sig: Take 1 tablet (40 mg) by mouth daily     Dispense:  90 tablet     Refill:  3       Medications Discontinued During This Encounter   Medication Reason     cefdinir (OMNICEF) 300 MG capsule      doxycycline hyclate (VIBRA-TABS) 100 MG tablet      mupirocin (BACTROBAN) 2 % external ointment      metoprolol succinate ER (TOPROL XL) 25 MG 24 hr tablet Reorder (No AVS / No eCancel)     rosuvastatin (CRESTOR) 40 MG tablet Reorder (No AVS / No eCancel)         Encounter Diagnoses   Name Primary?     Coronary artery disease of native artery of native heart with stable angina pectoris (H) Yes     Mixed hypercholesterolemia and hypertriglyceridemia      Abnormal cardiovascular stress test      Hypothyroidism, unspecified type      Class 1 obesity due to excess calories with serious comorbidity and body mass index (BMI) of 33.0 to 33.9 in adult      Peripheral edema        CURRENT MEDICATIONS:  Current Outpatient Medications   Medication Sig Dispense Refill     aspirin (ASA) 81 MG EC tablet Take 1 tablet (81 mg) by mouth daily       famotidine (PEPCID) 20 MG tablet 1 TABLET 30 MINUTES BEFORE BEDTIME. ONCE A DAY ORALLY FOR 90 DAYS       finasteride (PROSCAR) 5 MG tablet TAKE 1 TABLET(5 MG) BY MOUTH DAILY 90 tablet 2     levothyroxine (SYNTHROID/LEVOTHROID) 88 MCG tablet Take 1 tablet (88 mcg) by mouth daily 90  tablet 3     melatonin 1 MG CAPS        metoprolol succinate ER (TOPROL XL) 25 MG 24 hr tablet Take 1 tablet (25 mg) by mouth daily 90 tablet 3     rosuvastatin (CRESTOR) 40 MG tablet Take 1 tablet (40 mg) by mouth daily 90 tablet 3     tamsulosin (FLOMAX) 0.4 MG capsule TAKE 1 CAPSULE(0.4 MG) BY MOUTH DAILY 30 MINUTES AFTER THE EVENING MEAL 90 capsule 2     Vitamin D (Cholecalciferol) 25 MCG (1000 UT) TABS Take 2,000 Units by mouth Nature made D 3         ALLERGIES     Allergies   Allergen Reactions     Dust Mites      Sneezing, water eyes       PAST MEDICAL HISTORY:  Past Medical History:   Diagnosis Date     Adenomatous polyp of transverse colon 08/2021    fu 7 years     BPH (benign prostatic hyperplasia) 2016    Dr. Gonzalez     Burning sensation of throat 2021    egd nl,     Chest pain 01/2014    neg est echo     Coronary artery disease of native artery of native heart with stable angina pectoris (H) 07/20/2021    based on symptoms 5/21 and positive est, then seen by cards and med [facility]     Elevated PSA 2016    Dr. Gonzalez, bx neg, seen by Dr Brown 2022, had mri 2021     Erectile dysfunction     Dr. Gonzalez     Gastritis      Hypercholesteremia      Hypothyroid 2007     Normal colonoscopy 2010     Vitamin D deficiency 2009    Vitamin D2/D3 of 19       PAST SURGICAL HISTORY:  Past Surgical History:   Procedure Laterality Date     ABDOMEN SURGERY  01/08/2021    Incisional Hernia     BIOPSY  09/2015    Prostate. Later PSA results were found good.     COLONOSCOPY  2010    No issue found.     HERNIA REPAIR      Incisional Hernia     LAPAROSCOPIC CHOLECYSTECTOMY N/A 11/16/2015    Procedure: LAPAROSCOPIC CHOLECYSTECTOMY;  Surgeon: Sivakumar Burr MD;  Location: [facility]     LAPAROSCOPIC HERNIORRHAPHY VENTRAL N/A 01/08/2021    Procedure: LAPAROSCOPIC VENTRAL HERNIA REPAIR WITH MESH;  Surgeon: Sivakumar Burr MD;  Location:  OR     PROSTATE SURGERY      Trus BX       FAMILY HISTORY:  Family History   Problem  Relation Age of Onset     C.A.D. Mother      Arthritis Mother      Cancer Mother         ovarian     Coronary Artery Disease Mother         Angina, and Congestive heart failure     Other Cancer Mother         Ovarian     Osteoporosis Mother         After 70 years of age     Thyroid Disease Mother         Found low when 70+     Coronary Artery Disease Father         Angina     Hyperlipidemia Father      Hyperlipidemia Brother      Hypertension Brother      No Known Problems Sister      Cancer Maternal Grandfather         esophageal cancer     Other Cancer Maternal Grandfather         Esophagus     Cancer Paternal Grandmother         liver cancer     Other Cancer Paternal Grandmother         Liver     Cancer Paternal Grandfather         throat cancer     Other Cancer Paternal Grandfather         Throat     Hypertension Brother      Thyroid Disease Brother         Found low when 40+       SOCIAL HISTORY:  Social History     Socioeconomic History     Marital status:      Spouse name: None     Number of children: 2     Years of education: None     Highest education level: None   Occupational History     Occupation: quality engineer     Employer: [employer]     Employer: [employer]   Tobacco Use     Smoking status: Never     Smokeless tobacco: Never   Substance and Sexual Activity     Alcohol use: Never     Drug use: Never     Sexual activity: Yes     Partners: Female     Birth control/protection: Abstinence     Comment: New medicines have affected my general routine of life   Other Topics Concern     Parent/sibling w/ CABG, MI or angioplasty before 65F 55M? No       Review of Systems:  Skin:  Negative       Eyes:  Negative for glasses    ENT:  Negative      Respiratory:  Positive for dyspnea on exertion     Cardiovascular:  palpitations;Negative;lightheadedness;dizziness;fatigue chest pain;Positive for;edema chest pain: burning pain with exertion, improved since last year, edema: bilateral, more on the right  "side  Gastroenterology: Negative      Genitourinary:  Negative      Musculoskeletal:  Positive for back pain chronic low back pain  Neurologic:  Negative      Psychiatric:  Negative      Heme/Lymph/Imm:  Positive for allergies    Endocrine:  Positive for thyroid disorder      Physical Exam:  Vitals: /74 (BP Location: Right arm, Patient Position: Sitting)   Pulse 62   Ht 1.702 m (5' 7\")   Wt 96.2 kg (212 lb)   SpO2 98%   BMI 33.20 kg/m      Constitutional:  cooperative, alert and oriented, well developed, well nourished, in no acute distress obese      Skin:  warm and dry to the touch, no apparent skin lesions or masses noted          Head:  normocephalic, no masses or lesions        Eyes:  pupils equal and round, conjunctivae and lids unremarkable, sclera white, no xanthalasma, EOMS intact, no nystagmus        Lymph:      ENT:  no pallor or cyanosis, dentition good        Neck:  carotid pulses are full and equal bilaterally;no carotid bruit        Respiratory:  normal breath sounds, clear to auscultation, normal A-P diameter, normal symmetry, normal respiratory excursion, no use of accessory muscles         Cardiac: regular rhythm;no murmurs, gallops or rubs detected                pulses full and equal                                        GI:    obese      Extremities and Muscular Skeletal:  no edema;no spinal abnormalities noted;normal muscle strength and tone   varicose vein RLE edema;trace        Neurological:  no gross motor deficits        Psych:  affect appropriate, oriented to time, person and place        CC  Jude Pérez MD  [address]  [place] [address]              "

## 2023-06-08 ENCOUNTER — ANCILLARY ORDERS (OUTPATIENT)
Dept: CARDIOLOGY | Facility: CLINIC | Age: 63
End: 2023-06-08

## 2023-06-08 ENCOUNTER — TRANSFERRED RECORDS (OUTPATIENT)
Dept: HEALTH INFORMATION MANAGEMENT | Facility: CLINIC | Age: 63
End: 2023-06-08
Payer: COMMERCIAL

## 2023-06-08 DIAGNOSIS — I25.118 CORONARY ARTERY DISEASE OF NATIVE ARTERY OF NATIVE HEART WITH STABLE ANGINA PECTORIS: ICD-10-CM

## 2023-06-08 DIAGNOSIS — R94.39 ABNORMAL CARDIOVASCULAR STRESS TEST: ICD-10-CM

## 2023-06-09 ENCOUNTER — LAB (OUTPATIENT)
Dept: LAB | Facility: CLINIC | Age: 63
End: 2023-06-09
Payer: COMMERCIAL

## 2023-06-09 ENCOUNTER — TELEPHONE (OUTPATIENT)
Dept: CARDIOLOGY | Facility: CLINIC | Age: 63
End: 2023-06-09

## 2023-06-09 ENCOUNTER — HOSPITAL ENCOUNTER (OUTPATIENT)
Dept: CARDIOLOGY | Facility: CLINIC | Age: 63
Discharge: HOME | End: 2023-06-09
Attending: INTERNAL MEDICINE | Admitting: INTERNAL MEDICINE
Payer: COMMERCIAL

## 2023-06-09 VITALS — DIASTOLIC BLOOD PRESSURE: 74 MMHG | SYSTOLIC BLOOD PRESSURE: 127 MMHG | HEART RATE: 57 BPM

## 2023-06-09 DIAGNOSIS — R94.39 ABNORMAL CARDIOVASCULAR STRESS TEST: ICD-10-CM

## 2023-06-09 DIAGNOSIS — I25.118 CORONARY ARTERY DISEASE OF NATIVE ARTERY OF NATIVE HEART WITH STABLE ANGINA PECTORIS (H): ICD-10-CM

## 2023-06-09 DIAGNOSIS — E78.2 MIXED HYPERCHOLESTEROLEMIA AND HYPERTRIGLYCERIDEMIA: ICD-10-CM

## 2023-06-09 DIAGNOSIS — I25.118 CORONARY ARTERY DISEASE OF NATIVE ARTERY OF NATIVE HEART WITH STABLE ANGINA PECTORIS (H): Primary | ICD-10-CM

## 2023-06-09 LAB
ANION GAP SERPL CALCULATED.3IONS-SCNC: 8 MMOL/L (ref 7–15)
APO A-I SERPL-MCNC: 14 MG/DL
BUN SERPL-MCNC: 11.5 MG/DL (ref 8–23)
CALCIUM SERPL-MCNC: 8.8 MG/DL (ref 8.8–10.2)
CHLORIDE SERPL-SCNC: 105 MMOL/L (ref 98–107)
CREAT SERPL-MCNC: 0.96 MG/DL (ref 0.67–1.17)
CRP SERPL HS-MCNC: 1.12 MG/L
DEPRECATED HCO3 PLAS-SCNC: 25 MMOL/L (ref 22–29)
GFR SERPL CREATININE-BSD FRML MDRD: 89 ML/MIN/1.73M2
GLUCOSE SERPL-MCNC: 102 MG/DL (ref 70–99)
POTASSIUM SERPL-SCNC: 4.7 MMOL/L (ref 3.4–5.3)
SODIUM SERPL-SCNC: 138 MMOL/L (ref 136–145)

## 2023-06-09 PROCEDURE — 83695 ASSAY OF LIPOPROTEIN(A): CPT | Performed by: INTERNAL MEDICINE

## 2023-06-09 PROCEDURE — 75574 CT ANGIO HRT W/3D IMAGE: CPT

## 2023-06-09 PROCEDURE — 75574 CT ANGIO HRT W/3D IMAGE: CPT | Mod: 26 | Performed by: INTERNAL MEDICINE

## 2023-06-09 PROCEDURE — 36415 COLL VENOUS BLD VENIPUNCTURE: CPT | Performed by: INTERNAL MEDICINE

## 2023-06-09 PROCEDURE — 86141 C-REACTIVE PROTEIN HS: CPT | Performed by: INTERNAL MEDICINE

## 2023-06-09 PROCEDURE — 80048 BASIC METABOLIC PNL TOTAL CA: CPT | Performed by: INTERNAL MEDICINE

## 2023-06-09 PROCEDURE — 250N000011 HC RX IP 250 OP 636: Performed by: INTERNAL MEDICINE

## 2023-06-09 PROCEDURE — 250N000013 HC RX MED GY IP 250 OP 250 PS 637: Performed by: INTERNAL MEDICINE

## 2023-06-09 RX ORDER — ACYCLOVIR 200 MG/1
0-1 CAPSULE ORAL
Status: DISCONTINUED | OUTPATIENT
Start: 2023-06-09 | End: 2023-06-10 | Stop reason: HOSPADM

## 2023-06-09 RX ORDER — METOPROLOL TARTRATE 25 MG/1
25-100 TABLET, FILM COATED ORAL
Status: DISCONTINUED | OUTPATIENT
Start: 2023-06-09 | End: 2023-06-10 | Stop reason: HOSPADM

## 2023-06-09 RX ORDER — DIPHENHYDRAMINE HYDROCHLORIDE 50 MG/ML
25-50 INJECTION INTRAMUSCULAR; INTRAVENOUS
Status: DISCONTINUED | OUTPATIENT
Start: 2023-06-09 | End: 2023-06-10 | Stop reason: HOSPADM

## 2023-06-09 RX ORDER — IVABRADINE 5 MG/1
5-15 TABLET, FILM COATED ORAL
Status: DISCONTINUED | OUTPATIENT
Start: 2023-06-09 | End: 2023-06-10 | Stop reason: HOSPADM

## 2023-06-09 RX ORDER — IOPAMIDOL 755 MG/ML
500 INJECTION, SOLUTION INTRAVASCULAR ONCE
Status: COMPLETED | OUTPATIENT
Start: 2023-06-09 | End: 2023-06-09

## 2023-06-09 RX ORDER — METHYLPREDNISOLONE SODIUM SUCCINATE 125 MG/2ML
125 INJECTION, POWDER, LYOPHILIZED, FOR SOLUTION INTRAMUSCULAR; INTRAVENOUS
Status: DISCONTINUED | OUTPATIENT
Start: 2023-06-09 | End: 2023-06-10 | Stop reason: HOSPADM

## 2023-06-09 RX ORDER — ONDANSETRON 2 MG/ML
4 INJECTION INTRAMUSCULAR; INTRAVENOUS
Status: DISCONTINUED | OUTPATIENT
Start: 2023-06-09 | End: 2023-06-10 | Stop reason: HOSPADM

## 2023-06-09 RX ORDER — DILTIAZEM HYDROCHLORIDE 5 MG/ML
10-15 INJECTION INTRAVENOUS
Status: DISCONTINUED | OUTPATIENT
Start: 2023-06-09 | End: 2023-06-10 | Stop reason: HOSPADM

## 2023-06-09 RX ORDER — DIPHENHYDRAMINE HCL 25 MG
25 CAPSULE ORAL
Status: DISCONTINUED | OUTPATIENT
Start: 2023-06-09 | End: 2023-06-10 | Stop reason: HOSPADM

## 2023-06-09 RX ORDER — NITROGLYCERIN 0.4 MG/1
0.4 TABLET SUBLINGUAL
Status: DISCONTINUED | OUTPATIENT
Start: 2023-06-09 | End: 2023-06-10 | Stop reason: HOSPADM

## 2023-06-09 RX ORDER — METOPROLOL TARTRATE 1 MG/ML
5-15 INJECTION, SOLUTION INTRAVENOUS
Status: DISCONTINUED | OUTPATIENT
Start: 2023-06-09 | End: 2023-06-10 | Stop reason: HOSPADM

## 2023-06-09 RX ORDER — DILTIAZEM HCL 60 MG
120 TABLET ORAL
Status: DISCONTINUED | OUTPATIENT
Start: 2023-06-09 | End: 2023-06-10 | Stop reason: HOSPADM

## 2023-06-09 RX ADMIN — NITROGLYCERIN 0.4 MG: 0.4 TABLET SUBLINGUAL at 10:13

## 2023-06-09 RX ADMIN — IOPAMIDOL 120 ML: 755 INJECTION, SOLUTION INTRAVENOUS at 10:21

## 2023-06-09 NOTE — TELEPHONE ENCOUNTER
Per Dr. Pérez's recommendation: patient needs a coronary angiogram next week to see if stents will be workable or if he needs a CABG. If no opening with me then next available urgent visit for H&P.    1150 called patient to discuss recommendation. He is not willing to see anyone but Dr. Pérez and is not willing to cut his work travel short to plan either the H&P visit or the cath.  Patient accepted a visit with Dr. Pérez on 6/28/2023 and will work with the scheduling team to find the next cath opening after that visit.   Scheduling is calling the patient now to set up a cath date.    Patient's angiogram is scheduled for 6/30/2023.

## 2023-06-09 NOTE — TELEPHONE ENCOUNTER
Call from Dr. Méndez that he read patient's CTA angiogram today finding complex and severe disease. He wants Dr. Pérez updated asap as a coronary angiogram is indicated.    Coronary angiogram:  1. Total Agatston score 49.3, placing the patient in the 49th percentile when compared to age and gender matched control group.  2. Severe complex proximal circumflex stenosis with presence of a focal aneurysmal segment.  3. Moderate to severe focal mid LAD stenosis of the origin of the diagonal branch, 60-75%. Presence of motion, decreases accuracy.  4. Severe focal ostial stenosis of the second posterior descending artery.    Per Dr. Pérez's dictation 6/6/2023:  Renny has chronic stable exertional angina   Overall he thinks he is doing about the same or somewhat better  last year.  He is still not interested in a coronary angiogram but is interested in the CT angiogram.  I will order this and follow-up.    Will message Dr. Pérez to review

## 2023-06-12 ENCOUNTER — TELEPHONE (OUTPATIENT)
Dept: CARDIOLOGY | Facility: CLINIC | Age: 63
End: 2023-06-12
Payer: COMMERCIAL

## 2023-06-12 NOTE — TELEPHONE ENCOUNTER
Jude Pérez MD  You 17 minutes ago (1:13 PM)     Thanks. No continue as is.        [employer] message routed to patient with results.

## 2023-06-12 NOTE — TELEPHONE ENCOUNTER
BMP/CRP/lipoprotein (a) completed as below, hx chronic exertional angina, CTA showed complex and diffuse CAD. Angiogram is scheduled for 6/30/23 with H&P with Dr Pérez on 6/28/23. Routed to provider to review.       Component      Latest Ref Rng 6/9/2023  10:40 AM   Sodium      136 - 145 mmol/L 138    Potassium      3.4 - 5.3 mmol/L 4.7    Chloride      98 - 107 mmol/L 105    Carbon Dioxide (CO2)      22 - 29 mmol/L 25    Anion Gap      7 - 15 mmol/L 8    Urea Nitrogen      8.0 - 23.0 mg/dL 11.5    Creatinine      0.67 - 1.17 mg/dL 0.96    Calcium      8.8 - 10.2 mg/dL 8.8    Glucose      70 - 99 mg/dL 102 (H)    GFR Estimate      >60 mL/min/1.73m2 89    Lipoprotein (a)      <30 mg/dL 14    C-Reactive Protein High Sensitivity 1.12

## 2023-06-16 ENCOUNTER — OFFICE VISIT (OUTPATIENT)
Dept: CARDIOLOGY | Facility: CLINIC | Age: 63
End: 2023-06-16
Attending: INTERNAL MEDICINE
Payer: COMMERCIAL

## 2023-06-16 VITALS
DIASTOLIC BLOOD PRESSURE: 75 MMHG | WEIGHT: 210.4 LBS | HEIGHT: 67 IN | OXYGEN SATURATION: 98 % | SYSTOLIC BLOOD PRESSURE: 119 MMHG | BODY MASS INDEX: 33.02 KG/M2 | HEART RATE: 60 BPM

## 2023-06-16 DIAGNOSIS — E78.2 MIXED HYPERCHOLESTEROLEMIA AND HYPERTRIGLYCERIDEMIA: ICD-10-CM

## 2023-06-16 DIAGNOSIS — I25.118 CORONARY ARTERY DISEASE OF NATIVE ARTERY OF NATIVE HEART WITH STABLE ANGINA PECTORIS (H): Primary | ICD-10-CM

## 2023-06-16 DIAGNOSIS — R60.0 PERIPHERAL EDEMA: ICD-10-CM

## 2023-06-16 DIAGNOSIS — I87.2 VENOUS (PERIPHERAL) INSUFFICIENCY: ICD-10-CM

## 2023-06-16 DIAGNOSIS — R94.39 ABNORMAL CARDIOVASCULAR STRESS TEST: ICD-10-CM

## 2023-06-16 DIAGNOSIS — R73.01 IMPAIRED FASTING GLUCOSE: ICD-10-CM

## 2023-06-16 PROCEDURE — 99214 OFFICE O/P EST MOD 30 MIN: CPT | Performed by: INTERNAL MEDICINE

## 2023-06-16 NOTE — PROGRESS NOTES
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  I stented his sister as well.     Renny has a past medical history significant for mixed hyperlipidemia with elevated triglycerides and low HDL, impaired fasting glucose, hypothyroidism, GERD, erectile dysfunction, benign prostatic hypertrophy with elevated PSA, central obesity all consistent with metabolic syndrome, venous varicosities and periodic peripheral edema.  He is a lifelong nonsmoker.     He has a history of substernal chest discomfort that radiates to his left shoulder occur at higher levels of activity such as mowing the lawn.  If he stops, he thinks it goes away in 5 minutes or less.  He has had no prolonged episodes.  In 2021 he underwent a stress echo for which he was able to exercise 7 minutes of a standard Ramon protocol.  He did get his chest pain and 0.5 mm of ST-segment depression in the inferolateral leads.  The echo portion did appear to be normal.  He wanted a conservative course and did not want to go to the Cath Lab.  He has been started on a statin and recommendation to exercise 30 minutes 5 times a week and eat a predominantly plant-based diet.     He returned to clinic earlier this month thinking he might be doing somewhat better.  He did have pretty consistent discomfort shoveling snow this winter but thinks he is doing better mowing his lawn.  Unfortunately he is traveling more at work and has less control over his diet and exercise and has gained 15 pounds of weight. He states he will have to stop mowing his lawn after he is into it for about 20 minutes.  If he hurries and goes too fast, he will get it earlier.  Again it is alleviated in about 5 minutes.  He has no rest episodes.     He has no lightheadedness, dizziness, syncope or near syncope.  No dyspnea on exertion, orthopnea or PND.  He does have ankle swelling, right leg worse than left, especially on long plane  flights or if he is sitting or standing for prolonged periods of time.    We did a CT angiogram that appears to show the circumflex as the culprit but is also consistent with three-vessel coronary artery disease.  He has fairly calcified vessels.     ASSESSMENT AND PLAN: Montse returns for follow-up after his abnormal CT angiogram.  He comes accompanied by his sister who thinks he downplays his symptoms and is trying to avoid the inevitable.  We had a nice discussion I recommended given the fact that this is three-vessel calcified coronary artery disease that we need to proceed with cardiac catheterization for further evaluation treatment.  I discussed the risk, benefits and alternatives with him and his sister they appeared understand desire to proceed.     No symptoms to suggest heart failure or significant arrhythmia.     As would be expected with his weight gain and decreased exercise and eating out more frequently he has had a backslide in his cholesterol profile.  Cholesterol is 125 and HDL of 40 LDL of 54 and triglycerides of 155.  We talked about the importance of getting back to his improved lifestyle including low carbohydrate diet, regular exercise and weight loss.  We talked about adding Zetia to try to drive LDL lower in an effort to effect atherosclerotic regression.  At this time he wants to try to improve lifestyle.    To look for other risk factors I checked LP(a) and C-reactive protein both of which were normal.  Glucose was mildly elevated at 102 consistent with impaired fasting glucose.     As stated weight is up body mass index is now 33.     We also talked about his ankle edema right leg worse than left.  We talked about the fact that his dependent edema and that he needs to watch the salt, exercise more and elevate his legs.  I have also recommended if he has a long day of standing at work that he should use compression stockings.  He does have mild varicose veins.  If this continues to be  too troublesome I will refer him onto our venous cardiologist.    He is tentatively scheduled for 2 weeks down the road but is now anxious and wants to move this up and we will schedule for next week.       Thank you for allowing me to participate in his care.     Jude Pérez MD, [facility]          Today's clinic visit entailed:  Review of the result(s) of each unique test - CT angiogram, lab work  Ordering of each unique test  Prescription drug management  31 minutes spent by me on the date of the encounter doing chart review, history and exam, documentation and further activities per the note  Provider  Link to [facility] Help Grid     The level of medical decision making during this visit was of moderate complexity.      Orders Placed This Encounter   Procedures     Follow-Up with Cardiology       No orders of the defined types were placed in this encounter.      There are no discontinued medications.      Encounter Diagnoses   Name Primary?     Coronary artery disease of native artery of native heart with stable angina pectoris (H) Yes     Abnormal cardiovascular stress test      Venous (peripheral) insufficiency      Peripheral edema      Mixed hypercholesterolemia and hypertriglyceridemia        CURRENT MEDICATIONS:  Current Outpatient Medications   Medication Sig Dispense Refill     aspirin (ASA) 81 MG EC tablet Take 1 tablet (81 mg) by mouth daily       famotidine (PEPCID) 20 MG tablet 1 TABLET 30 MINUTES BEFORE BEDTIME. ONCE A DAY ORALLY FOR 90 DAYS       finasteride (PROSCAR) 5 MG tablet TAKE 1 TABLET(5 MG) BY MOUTH DAILY 90 tablet 2     levothyroxine (SYNTHROID/LEVOTHROID) 88 MCG tablet Take 1 tablet (88 mcg) by mouth daily 90 tablet 3     melatonin 1 MG CAPS        metoprolol succinate ER (TOPROL XL) 25 MG 24 hr tablet Take 1 tablet (25 mg) by mouth daily 90 tablet 3     rosuvastatin (CRESTOR) 40 MG tablet Take 1 tablet (40 mg) by mouth daily 90 tablet 3     tamsulosin (FLOMAX) 0.4 MG capsule TAKE 1  CAPSULE(0.4 MG) BY MOUTH DAILY 30 MINUTES AFTER THE EVENING MEAL 90 capsule 2     Vitamin D (Cholecalciferol) 25 MCG (1000 UT) TABS Take 2,000 Units by mouth Nature made D 3         ALLERGIES     Allergies   Allergen Reactions     Dust Mites      Sneezing, water eyes       PAST MEDICAL HISTORY:  Past Medical History:   Diagnosis Date     Adenomatous polyp of transverse colon 08/2021    fu 7 years     BPH (benign prostatic hyperplasia) 2016    Dr. Gonzalez     Burning sensation of throat 2021    egd nl,     Chest pain 01/2014    neg est echo     Coronary artery disease of native artery of native heart with stable angina pectoris (H) 07/20/2021    based on symptoms 5/21 and positive est, then seen by cards and med [facility]     Elevated PSA 2016    Dr. Gonzalez, bx neg, seen by Dr Brown 2022, had mri 2021     Erectile dysfunction     Dr. Gonzalez     Gastritis      Hypercholesteremia      Hypothyroid 2007     Normal colonoscopy 2010     Vitamin D deficiency 2009    Vitamin D2/D3 of 19       PAST SURGICAL HISTORY:  Past Surgical History:   Procedure Laterality Date     ABDOMEN SURGERY  01/08/2021    Incisional Hernia     BIOPSY  09/2015    Prostate. Later PSA results were found good.     COLONOSCOPY  2010    No issue found.     HERNIA REPAIR      Incisional Hernia     LAPAROSCOPIC CHOLECYSTECTOMY N/A 11/16/2015    Procedure: LAPAROSCOPIC CHOLECYSTECTOMY;  Surgeon: Sivakumar Burr MD;  Location:  [facility]     LAPAROSCOPIC HERNIORRHAPHY VENTRAL N/A 01/08/2021    Procedure: LAPAROSCOPIC VENTRAL HERNIA REPAIR WITH MESH;  Surgeon: Sivakumar Burr MD;  Location:  [facility]     PROSTATE SURGERY      Trus BX       FAMILY HISTORY:  Family History   Problem Relation Age of Onset     C.A.D. Mother      Arthritis Mother      Cancer Mother         ovarian     Coronary Artery Disease Mother         Angina, and Congestive heart failure     Other Cancer Mother         Ovarian     Osteoporosis Mother         After 70 years of age      Thyroid Disease Mother         Found low when 70+     Coronary Artery Disease Father         Angina     Hyperlipidemia Father      Hyperlipidemia Brother      Hypertension Brother      No Known Problems Sister      Cancer Maternal Grandfather         esophageal cancer     Other Cancer Maternal Grandfather         Esophagus     Cancer Paternal Grandmother         liver cancer     Other Cancer Paternal Grandmother         Liver     Cancer Paternal Grandfather         throat cancer     Other Cancer Paternal Grandfather         Throat     Hypertension Brother      Thyroid Disease Brother         Found low when 40+       SOCIAL HISTORY:  Social History     Socioeconomic History     Marital status:      Spouse name: None     Number of children: 2     Years of education: None     Highest education level: None   Occupational History     Occupation:      Employer: [employer]     Employer: [employer]   Tobacco Use     Smoking status: Never     Smokeless tobacco: Never   Substance and Sexual Activity     Alcohol use: Never     Drug use: Never     Sexual activity: Yes     Partners: Female     Birth control/protection: Abstinence     Comment: New medicines have affected my general routine of life   Other Topics Concern     Parent/sibling w/ CABG, MI or angioplasty before 65F 55M? No       Review of Systems:  Skin:  Negative       Eyes:  Negative for glasses    ENT:  Negative      Respiratory:  Positive for dyspnea on exertion     Cardiovascular:  palpitations;Negative;lightheadedness;dizziness;fatigue;chest pain Positive for;edema    Gastroenterology: Negative      Genitourinary:  Negative      Musculoskeletal:  Positive for back pain chronic low back pain  Neurologic:  Negative      Psychiatric:  Negative      Heme/Lymph/Imm:  Positive for allergies    Endocrine:  Positive for thyroid disorder      Physical Exam:  Vitals: /75 (BP Location: Left arm, Patient Position: Sitting)   Pulse 60   Ht 1.702 m  "(5' 7\")   Wt 95.4 kg (210 lb 6.4 oz)   SpO2 98%   BMI 32.95 kg/m      Constitutional:  cooperative, alert and oriented, well developed, well nourished, in no acute distress obese      Skin:  warm and dry to the touch, no apparent skin lesions or masses noted          Head:  normocephalic, no masses or lesions        Eyes:  pupils equal and round, conjunctivae and lids unremarkable, sclera white, no xanthalasma, EOMS intact, no nystagmus        Lymph:      ENT:  no pallor or cyanosis, dentition good        Neck:  carotid pulses are full and equal bilaterally;no carotid bruit        Respiratory:  normal breath sounds, clear to auscultation, normal A-P diameter, normal symmetry, normal respiratory excursion, no use of accessory muscles         Cardiac: regular rhythm;no murmurs, gallops or rubs detected                pulses full and equal                                        GI:    obese      Extremities and Muscular Skeletal:  no edema;no spinal abnormalities noted;normal muscle strength and tone   varicose vein RLE edema;trace        Neurological:  no gross motor deficits        Psych:  affect appropriate, oriented to time, person and place        CC  Jude Pérez MD  [address]  [place] [id number]              "

## 2023-06-16 NOTE — LETTER
6/16/2023    Alejandro Lewis MD  [address]  [place] [address]    RE: Sarkis Leean       Dear Colleague,     I had the pleasure of seeing Sarkis Darnell in the [facility].  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  I stented his sister as well.     Renny has a past medical history significant for mixed hyperlipidemia with elevated triglycerides and low HDL, impaired fasting glucose, hypothyroidism, GERD, erectile dysfunction, benign prostatic hypertrophy with elevated PSA, central obesity all consistent with metabolic syndrome, venous varicosities and periodic peripheral edema.  He is a lifelong nonsmoker.     He has a history of substernal chest discomfort that radiates to his left shoulder occur at higher levels of activity such as mowing the lawn.  If he stops, he thinks it goes away in 5 minutes or less.  He has had no prolonged episodes.  In 2021 he underwent a stress echo for which he was able to exercise 7 minutes of a standard Ramon protocol.  He did get his chest pain and 0.5 mm of ST-segment depression in the inferolateral leads.  The echo portion did appear to be normal.  He wanted a conservative course and did not want to go to the Cath Lab.  He has been started on a statin and recommendation to exercise 30 minutes 5 times a week and eat a predominantly plant-based diet.     He returned to clinic earlier this month thinking he might be doing somewhat better.  He did have pretty consistent discomfort shoveling snow this winter but thinks he is doing better mowing his lawn.  Unfortunately he is traveling more at work and has less control over his diet and exercise and has gained 15 pounds of weight. He states he will have to stop mowing his lawn after he is into it for about 20 minutes.  If he hurries and goes too fast, he will get it earlier.  Again it is alleviated in about 5 minutes.  He has no rest  episodes.     He has no lightheadedness, dizziness, syncope or near syncope.  No dyspnea on exertion, orthopnea or PND.  He does have ankle swelling, right leg worse than left, especially on long plane flights or if he is sitting or standing for prolonged periods of time.    We did a CT angiogram that appears to show the circumflex as the culprit but is also consistent with three-vessel coronary artery disease.  He has fairly calcified vessels.     ASSESSMENT AND PLAN: Montse returns for follow-up after his abnormal CT angiogram.  He comes accompanied by his sister who thinks he downplays his symptoms and is trying to avoid the inevitable.  We had a nice discussion I recommended given the fact that this is three-vessel calcified coronary artery disease that we need to proceed with cardiac catheterization for further evaluation treatment.  I discussed the risk, benefits and alternatives with him and his sister they appeared understand desire to proceed.     No symptoms to suggest heart failure or significant arrhythmia.     As would be expected with his weight gain and decreased exercise and eating out more frequently he has had a backslide in his cholesterol profile.  Cholesterol is 125 and HDL of 40 LDL of 54 and triglycerides of 155.  We talked about the importance of getting back to his improved lifestyle including low carbohydrate diet, regular exercise and weight loss.  We talked about adding Zetia to try to drive LDL lower in an effort to effect atherosclerotic regression.  At this time he wants to try to improve lifestyle.    To look for other risk factors I checked LP(a) and C-reactive protein both of which were normal.  Glucose was mildly elevated at 102 consistent with impaired fasting glucose.     As stated weight is up body mass index is now 33.     We also talked about his ankle edema right leg worse than left.  We talked about the fact that his dependent edema and that he needs to watch the salt,  exercise more and elevate his legs.  I have also recommended if he has a long day of standing at work that he should use compression stockings.  He does have mild varicose veins.  If this continues to be too troublesome I will refer him onto our venous cardiologist.    He is tentatively scheduled for 2 weeks down the road but is now anxious and wants to move this up and we will schedule for next week.       Thank you for allowing me to participate in his care.     Jude Pérez MD, [facility]          Today's clinic visit entailed:  Review of the result(s) of each unique test - CT angiogram, lab work  Ordering of each unique test  Prescription drug management  31 minutes spent by me on the date of the encounter doing chart review, history and exam, documentation and further activities per the note  Provider  Link to [facility] Help Grid     The level of medical decision making during this visit was of moderate complexity.      Orders Placed This Encounter   Procedures    Follow-Up with Cardiology       No orders of the defined types were placed in this encounter.      There are no discontinued medications.      Encounter Diagnoses   Name Primary?    Coronary artery disease of native artery of native heart with stable angina pectoris (H) Yes    Abnormal cardiovascular stress test     Venous (peripheral) insufficiency     Peripheral edema     Mixed hypercholesterolemia and hypertriglyceridemia        CURRENT MEDICATIONS:  Current Outpatient Medications   Medication Sig Dispense Refill    aspirin (ASA) 81 MG EC tablet Take 1 tablet (81 mg) by mouth daily      famotidine (PEPCID) 20 MG tablet 1 TABLET 30 MINUTES BEFORE BEDTIME. ONCE A DAY ORALLY FOR 90 DAYS      finasteride (PROSCAR) 5 MG tablet TAKE 1 TABLET(5 MG) BY MOUTH DAILY 90 tablet 2    levothyroxine (SYNTHROID/LEVOTHROID) 88 MCG tablet Take 1 tablet (88 mcg) by mouth daily 90 tablet 3    melatonin 1 MG CAPS       metoprolol succinate ER (TOPROL XL) 25 MG 24 hr  tablet Take 1 tablet (25 mg) by mouth daily 90 tablet 3    rosuvastatin (CRESTOR) 40 MG tablet Take 1 tablet (40 mg) by mouth daily 90 tablet 3    tamsulosin (FLOMAX) 0.4 MG capsule TAKE 1 CAPSULE(0.4 MG) BY MOUTH DAILY 30 MINUTES AFTER THE EVENING MEAL 90 capsule 2    Vitamin D (Cholecalciferol) 25 MCG (1000 UT) TABS Take 2,000 Units by mouth Nature made D 3         ALLERGIES     Allergies   Allergen Reactions    Dust Mites      Sneezing, water eyes       PAST MEDICAL HISTORY:  Past Medical History:   Diagnosis Date    Adenomatous polyp of transverse colon 08/2021    fu 7 years    BPH (benign prostatic hyperplasia) 2016    Dr. Gonzalez    Burning sensation of throat 2021    egd nl,    Chest pain 01/2014    neg est echo    Coronary artery disease of native artery of native heart with stable angina pectoris (H) 07/20/2021    based on symptoms 5/21 and positive est, then seen by cards and med mgmt    Elevated PSA 2016    Dr. Gonzalez, bx neg, seen by Dr Brown 2022, had mri 2021    Erectile dysfunction     Dr. Gonzalez    Gastritis     Hypercholesteremia     Hypothyroid 2007    Normal colonoscopy 2010    Vitamin D deficiency 2009    Vitamin D2/D3 of 19       PAST SURGICAL HISTORY:  Past Surgical History:   Procedure Laterality Date    ABDOMEN SURGERY  01/08/2021    Incisional Hernia    BIOPSY  09/2015    Prostate. Later PSA results were found good.    COLONOSCOPY  2010    No issue found.    HERNIA REPAIR      Incisional Hernia    LAPAROSCOPIC CHOLECYSTECTOMY N/A 11/16/2015    Procedure: LAPAROSCOPIC CHOLECYSTECTOMY;  Surgeon: Sivakumar Burr MD;  Location: [facility]    LAPAROSCOPIC HERNIORRHAPHY VENTRAL N/A 01/08/2021    Procedure: LAPAROSCOPIC VENTRAL HERNIA REPAIR WITH MESH;  Surgeon: Sivakumar Burr MD;  Location:  OR    PROSTATE SURGERY      Trus BX       FAMILY HISTORY:  Family History   Problem Relation Age of Onset    C.A.D. Mother     Arthritis Mother     Cancer Mother         ovarian    Coronary Artery  Disease Mother         Angina, and Congestive heart failure    Other Cancer Mother         Ovarian    Osteoporosis Mother         After 70 years of age    Thyroid Disease Mother         Found low when 70+    Coronary Artery Disease Father         Angina    Hyperlipidemia Father     Hyperlipidemia Brother     Hypertension Brother     No Known Problems Sister     Cancer Maternal Grandfather         esophageal cancer    Other Cancer Maternal Grandfather         Esophagus    Cancer Paternal Grandmother         liver cancer    Other Cancer Paternal Grandmother         Liver    Cancer Paternal Grandfather         throat cancer    Other Cancer Paternal Grandfather         Throat    Hypertension Brother     Thyroid Disease Brother         Found low when 40+       SOCIAL HISTORY:  Social History     Socioeconomic History    Marital status:      Spouse name: None    Number of children: 2    Years of education: None    Highest education level: None   Occupational History    Occupation: quality engineer     Employer: [employer]     Employer: [employer]   Tobacco Use    Smoking status: Never    Smokeless tobacco: Never   Substance and Sexual Activity    Alcohol use: Never    Drug use: Never    Sexual activity: Yes     Partners: Female     Birth control/protection: Abstinence     Comment: New medicines have affected my general routine of life   Other Topics Concern    Parent/sibling w/ CABG, MI or angioplasty before 65F 55M? No       Review of Systems:  Skin:  Negative       Eyes:  Negative for glasses    ENT:  Negative      Respiratory:  Positive for dyspnea on exertion     Cardiovascular:  palpitations;Negative;lightheadedness;dizziness;fatigue;chest pain Positive for;edema    Gastroenterology: Negative      Genitourinary:  Negative      Musculoskeletal:  Positive for back pain chronic low back pain  Neurologic:  Negative      Psychiatric:  Negative      Heme/Lymph/Imm:  Positive for allergies    Endocrine:  Positive for  "thyroid disorder      Physical Exam:  Vitals: /75 (BP Location: Left arm, Patient Position: Sitting)   Pulse 60   Ht 1.702 m (5' 7\")   Wt 95.4 kg (210 lb 6.4 oz)   SpO2 98%   BMI 32.95 kg/m      Constitutional:  cooperative, alert and oriented, well developed, well nourished, in no acute distress obese      Skin:  warm and dry to the touch, no apparent skin lesions or masses noted          Head:  normocephalic, no masses or lesions        Eyes:  pupils equal and round, conjunctivae and lids unremarkable, sclera white, no xanthalasma, EOMS intact, no nystagmus        Lymph:      ENT:  no pallor or cyanosis, dentition good        Neck:  carotid pulses are full and equal bilaterally;no carotid bruit        Respiratory:  normal breath sounds, clear to auscultation, normal A-P diameter, normal symmetry, normal respiratory excursion, no use of accessory muscles         Cardiac: regular rhythm;no murmurs, gallops or rubs detected                pulses full and equal                                        GI:    obese      Extremities and Muscular Skeletal:  no edema;no spinal abnormalities noted;normal muscle strength and tone   varicose vein RLE edema;trace        Neurological:  no gross motor deficits        Psych:  affect appropriate, oriented to time, person and place        CC  Jude Pérez MD  [address] [address]      Thank you for allowing me to participate in the care of your patient.      Sincerely,     Jude Pérez MD     [facility]  "

## 2023-06-21 ENCOUNTER — TELEPHONE (OUTPATIENT)
Dept: CARDIOLOGY | Facility: CLINIC | Age: 63
End: 2023-06-21
Payer: COMMERCIAL

## 2023-06-21 DIAGNOSIS — I25.118 CORONARY ARTERY DISEASE OF NATIVE ARTERY OF NATIVE HEART WITH STABLE ANGINA PECTORIS (H): Primary | ICD-10-CM

## 2023-06-21 RX ORDER — LIDOCAINE 40 MG/G
CREAM TOPICAL
Status: CANCELLED | OUTPATIENT
Start: 2023-06-21

## 2023-06-21 RX ORDER — POTASSIUM CHLORIDE 1500 MG/1
20 TABLET, EXTENDED RELEASE ORAL
Status: CANCELLED | OUTPATIENT
Start: 2023-06-21

## 2023-06-21 RX ORDER — SODIUM CHLORIDE 9 MG/ML
INJECTION, SOLUTION INTRAVENOUS CONTINUOUS
Status: CANCELLED | OUTPATIENT
Start: 2023-06-21

## 2023-06-21 RX ORDER — ASPIRIN 325 MG
325 TABLET ORAL ONCE
Status: CANCELLED | OUTPATIENT
Start: 2023-06-21 | End: 2023-06-21

## 2023-06-21 RX ORDER — ASPIRIN 81 MG/1
243 TABLET, CHEWABLE ORAL ONCE
Status: CANCELLED | OUTPATIENT
Start: 2023-06-21

## 2023-06-21 NOTE — TELEPHONE ENCOUNTER
Coronary angiogram/PCI/Right Heart Cath prep instructions.     Patient is scheduled for a Coronary Angio w/possible PCI at [facility] - [address] [place] [place] [address] - Main Entrance of the Hospital on 6/23/2023.  Check in time is at 0730 and procedure to follow.    Patient instructed to remain NPO for solid foods 8 hours prior to arrival and may have clear liquids up to 2 hours prior to arrival.    Patient Patient does not require extra fluids prior to procedure.    Patient is not diabetic.    Patient is not on anticoagulation.    Patient is not on diuretics.     Patient is taking ASA 81mg daily and will take 4 tabs (324mg) the morning of the procedure.    Pt is not on a SGLT2 inhibitor.    Patient advised to take their other daily medications the morning of the procedure with small sips of water.     Verified patient does not have a contrast allergy.    Verified patient has someone available to drive them home from the hospital and can stay with them for 24 hours after the procedure.     Patient advised to notify care team with any new COVID like symptoms prior to procedure.    Patient will check their temperature the morning of procedure and call [facility] at [phone] if temp is >100.0.    Patient is aware of visitor policy.    Patient expresses understanding of above instructions and denies further questions at this time.    Orders placed.

## 2023-06-23 ENCOUNTER — HOSPITAL ENCOUNTER (OUTPATIENT)
Facility: CLINIC | Age: 63
Discharge: HOME | End: 2023-06-23
Admitting: INTERNAL MEDICINE
Payer: COMMERCIAL

## 2023-06-23 VITALS
HEIGHT: 67 IN | TEMPERATURE: 97.9 F | DIASTOLIC BLOOD PRESSURE: 78 MMHG | BODY MASS INDEX: 32.96 KG/M2 | SYSTOLIC BLOOD PRESSURE: 119 MMHG | HEART RATE: 55 BPM | OXYGEN SATURATION: 100 % | WEIGHT: 210 LBS | RESPIRATION RATE: 16 BRPM

## 2023-06-23 DIAGNOSIS — I25.118 CORONARY ARTERY DISEASE OF NATIVE ARTERY OF NATIVE HEART WITH STABLE ANGINA PECTORIS (H): ICD-10-CM

## 2023-06-23 DIAGNOSIS — R94.39 ABNORMAL CARDIOVASCULAR STRESS TEST: ICD-10-CM

## 2023-06-23 PROBLEM — Z98.890 STATUS POST CORONARY ANGIOGRAM: Status: ACTIVE | Noted: 2023-06-23

## 2023-06-23 LAB
ACT BLD: 305 SECONDS (ref 74–150)
ACT BLD: 318 SECONDS (ref 74–150)
ANION GAP SERPL CALCULATED.3IONS-SCNC: 11 MMOL/L (ref 7–15)
APTT PPP: 26 SECONDS (ref 22–38)
BUN SERPL-MCNC: 13 MG/DL (ref 8–23)
CALCIUM SERPL-MCNC: 9 MG/DL (ref 8.8–10.2)
CHLORIDE SERPL-SCNC: 106 MMOL/L (ref 98–107)
CHOLEST SERPL-MCNC: 107 MG/DL
CREAT SERPL-MCNC: 1.04 MG/DL (ref 0.67–1.17)
DEPRECATED HCO3 PLAS-SCNC: 24 MMOL/L (ref 22–29)
ERYTHROCYTE [DISTWIDTH] IN BLOOD BY AUTOMATED COUNT: 13 % (ref 10–15)
GFR SERPL CREATININE-BSD FRML MDRD: 81 ML/MIN/1.73M2
GLUCOSE SERPL-MCNC: 114 MG/DL (ref 70–99)
HCT VFR BLD AUTO: 42.4 % (ref 40–53)
HDLC SERPL-MCNC: 40 MG/DL
HGB BLD-MCNC: 13.9 G/DL (ref 13.3–17.7)
INR PPP: 0.97 (ref 0.85–1.15)
LDLC SERPL CALC-MCNC: 43 MG/DL
MCH RBC QN AUTO: 28.7 PG (ref 26.5–33)
MCHC RBC AUTO-ENTMCNC: 32.8 G/DL (ref 31.5–36.5)
MCV RBC AUTO: 87 FL (ref 78–100)
NONHDLC SERPL-MCNC: 67 MG/DL
PLATELET # BLD AUTO: 231 10E3/UL (ref 150–450)
POTASSIUM SERPL-SCNC: 4 MMOL/L (ref 3.4–5.3)
RBC # BLD AUTO: 4.85 10E6/UL (ref 4.4–5.9)
SODIUM SERPL-SCNC: 141 MMOL/L (ref 136–145)
TRIGL SERPL-MCNC: 122 MG/DL
WBC # BLD AUTO: 8.7 10E3/UL (ref 4–11)

## 2023-06-23 PROCEDURE — 250N000013 HC RX MED GY IP 250 OP 250 PS 637: Performed by: INTERNAL MEDICINE

## 2023-06-23 PROCEDURE — 999N000071 HC STATISTIC HEART CATH LAB OR EP LAB

## 2023-06-23 PROCEDURE — 93454 CORONARY ARTERY ANGIO S&I: CPT | Performed by: INTERNAL MEDICINE

## 2023-06-23 PROCEDURE — 250N000011 HC RX IP 250 OP 636: Performed by: INTERNAL MEDICINE

## 2023-06-23 PROCEDURE — 85027 COMPLETE CBC AUTOMATED: CPT | Performed by: INTERNAL MEDICINE

## 2023-06-23 PROCEDURE — 258N000003 HC RX IP 258 OP 636: Performed by: INTERNAL MEDICINE

## 2023-06-23 PROCEDURE — C9600 PERC DRUG-EL COR STENT SING: HCPCS | Mod: LD | Performed by: INTERNAL MEDICINE

## 2023-06-23 PROCEDURE — C1760 CLOSURE DEV, VASC: HCPCS | Performed by: INTERNAL MEDICINE

## 2023-06-23 PROCEDURE — 36415 COLL VENOUS BLD VENIPUNCTURE: CPT | Performed by: INTERNAL MEDICINE

## 2023-06-23 PROCEDURE — 999N000054 HC STATISTIC EKG NON-CHARGEABLE

## 2023-06-23 PROCEDURE — 85347 COAGULATION TIME ACTIVATED: CPT

## 2023-06-23 PROCEDURE — 250N000011 HC RX IP 250 OP 636: Mod: JZ | Performed by: INTERNAL MEDICINE

## 2023-06-23 PROCEDURE — 272N000001 HC OR GENERAL SUPPLY STERILE: Performed by: INTERNAL MEDICINE

## 2023-06-23 PROCEDURE — C1887 CATHETER, GUIDING: HCPCS | Performed by: INTERNAL MEDICINE

## 2023-06-23 PROCEDURE — 85730 THROMBOPLASTIN TIME PARTIAL: CPT | Performed by: INTERNAL MEDICINE

## 2023-06-23 PROCEDURE — 93005 ELECTROCARDIOGRAM TRACING: CPT

## 2023-06-23 PROCEDURE — 250N000009 HC RX 250: Performed by: INTERNAL MEDICINE

## 2023-06-23 PROCEDURE — 999N000184 HC STATISTIC TELEMETRY

## 2023-06-23 PROCEDURE — 93454 CORONARY ARTERY ANGIO S&I: CPT | Mod: 26 | Performed by: INTERNAL MEDICINE

## 2023-06-23 PROCEDURE — C1769 GUIDE WIRE: HCPCS | Performed by: INTERNAL MEDICINE

## 2023-06-23 PROCEDURE — C1725 CATH, TRANSLUMIN NON-LASER: HCPCS | Performed by: INTERNAL MEDICINE

## 2023-06-23 PROCEDURE — 99153 MOD SED SAME PHYS/QHP EA: CPT | Performed by: INTERNAL MEDICINE

## 2023-06-23 PROCEDURE — 82310 ASSAY OF CALCIUM: CPT | Performed by: INTERNAL MEDICINE

## 2023-06-23 PROCEDURE — C9600 PERC DRUG-EL COR STENT SING: HCPCS | Mod: LC

## 2023-06-23 PROCEDURE — 99152 MOD SED SAME PHYS/QHP 5/>YRS: CPT | Performed by: INTERNAL MEDICINE

## 2023-06-23 PROCEDURE — C1874 STENT, COATED/COV W/DEL SYS: HCPCS | Performed by: INTERNAL MEDICINE

## 2023-06-23 PROCEDURE — 80061 LIPID PANEL: CPT

## 2023-06-23 PROCEDURE — 92928 PRQ TCAT PLMT NTRAC ST 1 LES: CPT | Mod: 76 | Performed by: INTERNAL MEDICINE

## 2023-06-23 PROCEDURE — 85610 PROTHROMBIN TIME: CPT | Performed by: INTERNAL MEDICINE

## 2023-06-23 PROCEDURE — 36591 DRAW BLOOD OFF VENOUS DEVICE: CPT

## 2023-06-23 PROCEDURE — 93010 ELECTROCARDIOGRAM REPORT: CPT | Mod: 59 | Performed by: INTERNAL MEDICINE

## 2023-06-23 DEVICE — STENT COR ONYX FRONTIER 22X3MM ONYXNG30022UX: Type: IMPLANTABLE DEVICE | Status: FUNCTIONAL

## 2023-06-23 DEVICE — CLOSURE ANGIOSEAL 6FR 610130: Type: IMPLANTABLE DEVICE | Status: FUNCTIONAL

## 2023-06-23 DEVICE — IMPLANTABLE DEVICE: Type: IMPLANTABLE DEVICE | Status: FUNCTIONAL

## 2023-06-23 RX ORDER — NALOXONE HYDROCHLORIDE 0.4 MG/ML
0.4 INJECTION, SOLUTION INTRAMUSCULAR; INTRAVENOUS; SUBCUTANEOUS
Status: DISCONTINUED | OUTPATIENT
Start: 2023-06-23 | End: 2023-06-23 | Stop reason: HOSPADM

## 2023-06-23 RX ORDER — POTASSIUM CHLORIDE 1500 MG/1
20 TABLET, EXTENDED RELEASE ORAL
Status: DISCONTINUED | OUTPATIENT
Start: 2023-06-23 | End: 2023-06-23 | Stop reason: HOSPADM

## 2023-06-23 RX ORDER — NALOXONE HYDROCHLORIDE 0.4 MG/ML
0.2 INJECTION, SOLUTION INTRAMUSCULAR; INTRAVENOUS; SUBCUTANEOUS
Status: DISCONTINUED | OUTPATIENT
Start: 2023-06-23 | End: 2023-06-23 | Stop reason: HOSPADM

## 2023-06-23 RX ORDER — FENTANYL CITRATE 50 UG/ML
INJECTION, SOLUTION INTRAMUSCULAR; INTRAVENOUS
Status: DISCONTINUED | OUTPATIENT
Start: 2023-06-23 | End: 2023-06-23 | Stop reason: HOSPADM

## 2023-06-23 RX ORDER — FLUMAZENIL 0.1 MG/ML
0.2 INJECTION, SOLUTION INTRAVENOUS
Status: DISCONTINUED | OUTPATIENT
Start: 2023-06-23 | End: 2023-06-23 | Stop reason: HOSPADM

## 2023-06-23 RX ORDER — HYDRALAZINE HYDROCHLORIDE 20 MG/ML
10 INJECTION INTRAMUSCULAR; INTRAVENOUS EVERY 4 HOURS PRN
Status: DISCONTINUED | OUTPATIENT
Start: 2023-06-23 | End: 2023-06-23 | Stop reason: HOSPADM

## 2023-06-23 RX ORDER — SODIUM CHLORIDE 9 MG/ML
INJECTION, SOLUTION INTRAVENOUS CONTINUOUS
Status: DISCONTINUED | OUTPATIENT
Start: 2023-06-23 | End: 2023-06-23 | Stop reason: HOSPADM

## 2023-06-23 RX ORDER — ASPIRIN 81 MG/1
81 TABLET, CHEWABLE ORAL ONCE
Status: COMPLETED | OUTPATIENT
Start: 2023-06-23 | End: 2023-06-23

## 2023-06-23 RX ORDER — HEPARIN SODIUM 1000 [USP'U]/ML
INJECTION, SOLUTION INTRAVENOUS; SUBCUTANEOUS
Status: DISCONTINUED | OUTPATIENT
Start: 2023-06-23 | End: 2023-06-23 | Stop reason: HOSPADM

## 2023-06-23 RX ORDER — OXYCODONE HYDROCHLORIDE 5 MG/1
10 TABLET ORAL EVERY 4 HOURS PRN
Status: DISCONTINUED | OUTPATIENT
Start: 2023-06-23 | End: 2023-06-23 | Stop reason: HOSPADM

## 2023-06-23 RX ORDER — ONDANSETRON 4 MG/1
4 TABLET, ORALLY DISINTEGRATING ORAL EVERY 6 HOURS PRN
Status: DISCONTINUED | OUTPATIENT
Start: 2023-06-23 | End: 2023-06-23 | Stop reason: HOSPADM

## 2023-06-23 RX ORDER — ASPIRIN 81 MG/1
243 TABLET, CHEWABLE ORAL ONCE
Status: COMPLETED | OUTPATIENT
Start: 2023-06-23 | End: 2023-06-23

## 2023-06-23 RX ORDER — FENTANYL CITRATE 50 UG/ML
25 INJECTION, SOLUTION INTRAMUSCULAR; INTRAVENOUS
Status: DISCONTINUED | OUTPATIENT
Start: 2023-06-23 | End: 2023-06-23 | Stop reason: HOSPADM

## 2023-06-23 RX ORDER — LIDOCAINE 40 MG/G
CREAM TOPICAL
Status: DISCONTINUED | OUTPATIENT
Start: 2023-06-23 | End: 2023-06-23 | Stop reason: HOSPADM

## 2023-06-23 RX ORDER — ASPIRIN 81 MG/1
81 TABLET, CHEWABLE ORAL DAILY
Qty: 30 TABLET | Refills: 3 | Status: SHIPPED | OUTPATIENT
Start: 2023-06-24 | End: 2023-06-30

## 2023-06-23 RX ORDER — ATROPINE SULFATE 0.1 MG/ML
0.5 INJECTION INTRAVENOUS
Status: DISCONTINUED | OUTPATIENT
Start: 2023-06-23 | End: 2023-06-23 | Stop reason: HOSPADM

## 2023-06-23 RX ORDER — ACETAMINOPHEN 325 MG/1
650 TABLET ORAL EVERY 4 HOURS PRN
Status: DISCONTINUED | OUTPATIENT
Start: 2023-06-23 | End: 2023-06-23 | Stop reason: HOSPADM

## 2023-06-23 RX ORDER — OXYCODONE HYDROCHLORIDE 5 MG/1
5 TABLET ORAL EVERY 4 HOURS PRN
Status: DISCONTINUED | OUTPATIENT
Start: 2023-06-23 | End: 2023-06-23 | Stop reason: HOSPADM

## 2023-06-23 RX ORDER — ONDANSETRON 2 MG/ML
4 INJECTION INTRAMUSCULAR; INTRAVENOUS EVERY 6 HOURS PRN
Status: DISCONTINUED | OUTPATIENT
Start: 2023-06-23 | End: 2023-06-23 | Stop reason: HOSPADM

## 2023-06-23 RX ORDER — NITROGLYCERIN 5 MG/ML
VIAL (ML) INTRAVENOUS
Status: DISCONTINUED | OUTPATIENT
Start: 2023-06-23 | End: 2023-06-23 | Stop reason: HOSPADM

## 2023-06-23 RX ORDER — ASPIRIN 325 MG
325 TABLET ORAL ONCE
Status: COMPLETED | OUTPATIENT
Start: 2023-06-23 | End: 2023-06-23

## 2023-06-23 RX ORDER — ASPIRIN 81 MG/1
81 TABLET ORAL DAILY
Status: DISCONTINUED | OUTPATIENT
Start: 2023-06-24 | End: 2023-06-23 | Stop reason: HOSPADM

## 2023-06-23 RX ORDER — ACETAMINOPHEN 500 MG
1000 TABLET ORAL ONCE
Status: COMPLETED | OUTPATIENT
Start: 2023-06-23 | End: 2023-06-23

## 2023-06-23 RX ORDER — NITROGLYCERIN 0.4 MG/1
0.4 TABLET SUBLINGUAL EVERY 5 MIN PRN
Status: DISCONTINUED | OUTPATIENT
Start: 2023-06-23 | End: 2023-06-23 | Stop reason: HOSPADM

## 2023-06-23 RX ORDER — IOPAMIDOL 755 MG/ML
INJECTION, SOLUTION INTRAVASCULAR
Status: DISCONTINUED | OUTPATIENT
Start: 2023-06-23 | End: 2023-06-23 | Stop reason: HOSPADM

## 2023-06-23 RX ORDER — METOPROLOL TARTRATE 1 MG/ML
5 INJECTION, SOLUTION INTRAVENOUS
Status: DISCONTINUED | OUTPATIENT
Start: 2023-06-23 | End: 2023-06-23 | Stop reason: HOSPADM

## 2023-06-23 RX ADMIN — SODIUM CHLORIDE: 9 INJECTION, SOLUTION INTRAVENOUS at 08:36

## 2023-06-23 RX ADMIN — ACETAMINOPHEN 1000 MG: 500 TABLET, FILM COATED ORAL at 14:51

## 2023-06-23 RX ADMIN — OXYCODONE HYDROCHLORIDE 5 MG: 5 TABLET ORAL at 12:38

## 2023-06-23 ASSESSMENT — ACTIVITIES OF DAILY LIVING (ADL)
ADLS_ACUITY_SCORE: 35

## 2023-06-23 NOTE — PROGRESS NOTES
Care Suites Discharge Nursing Note    Patient Information  Name: Sarkis Darnell  Age: 63 year old    Discharge Education:  Discharge instructions reviewed: Yes  Additional education/resources provided: stent card, angio-seal pamphlet  Patient/patient representative verbalizes understanding: Yes  Patient discharging on new medications: Yes new Rx Brilinta filled and given to pt.  Medication education completed: Yes    Discharge Plans:   Discharge location: home  Discharge ride contacted: Yes  Approximate discharge time: 1500    Discharge Criteria:  Discharge criteria met and vital signs stable: Yes    Patient Belongs:  Patient belongings returned to patient: Yes    Nikky Shannon RN

## 2023-06-23 NOTE — PRE-PROCEDURE
GENERAL PRE-PROCEDURE:   Procedure:  Coronary angiogram +/- PCI  Date/Time:  6/23/2023 9:49 AM    Verbal consent obtained?: Yes    Written consent obtained?: Yes    Risks and benefits: Risks, benefits and alternatives were discussed    Consent given by:  Patient  Patient states understanding of procedure being performed: Yes    Patient's understanding of procedure matches consent: Yes    Procedure consent matches procedure scheduled: Yes    Expected level of sedation:  Moderate  Appropriately NPO:  Yes  ASA Class:  3  Mallampati  :  Grade 2- soft palate, base of uvula, tonsillar pillars, and portion of posterior pharyngeal wall visible  Lungs:  Lungs clear with good breath sounds bilaterally  Heart:  Normal heart sounds and rate  History & Physical reviewed:  History and physical reviewed and no updates needed  Statement of review:  I have reviewed the lab findings, diagnostic data, medications, and the plan for sedation

## 2023-06-23 NOTE — Clinical Note
The first balloon was inserted into the circumflex.Max pressure = 12 sena. Total duration = 20 seconds.     Max pressure = 16 sena. Total duration = 25 seconds.    Balloon reinflated a second time: Max pressure = 16 sena. Total duration = 25 seconds.

## 2023-06-23 NOTE — PROGRESS NOTES
Care Suites Admission Nursing Note    Patient Information  Name: Sarkis Darnell  Age: 63 year old  Reason for admission: heart cath  Care Suites arrival time: 0730    Visitor Information  Name: nam Hutton     Patient Admission/Assessment   Pre-procedure assessment complete: Yes  If abnormal assessment/labs, provider notified: N/A  NPO: Yes  Medications held per instructions/orders: N/A  Consent: obtained  If applicable, pregnancy test status: deferred  Patient oriented to room: Yes  Education/questions answered: Yes  Plan/other: heart cath    Discharge Planning  Discharge name/phone number: nam Hutton [phone]  Overnight post sedation caregiver: Brennen  Discharge location: home    Nikky Shannon RN

## 2023-06-23 NOTE — PROGRESS NOTES
Care Suites Post Procedure Note    Patient Information  Name: Sarkis Darnell  Age: 63 year old    Post Procedure  Time patient returned to Care Suites: 1115  Concerns/abnormal assessment: none at present  If abnormal assessment, provider notified: N/A  Plan/Other: monitor    Michael Kemp RN

## 2023-06-23 NOTE — Clinical Note
The first balloon was inserted into the left anterior descending.Max pressure = 10 sena. Total duration = 12 seconds.

## 2023-06-23 NOTE — Clinical Note
The first balloon was inserted into the left anterior descending.Max pressure = 14 sena. Total duration = 25 seconds.

## 2023-06-23 NOTE — PROGRESS NOTES
Late entry: Oxycodone 5 mg PO was given at 1238 per pt request for c/o right shoulder pain and stated that pain was gone by 1400. Pt out of bed at 1335 and walked to BR to void and walked in halls. Right groin site remained soft with no signs of bleeding or hematoma, but after pt was dressed and ready to go home, he c/o severe groin pain radiating to right hip area that started when he got up and became worse. No hematoma or bleeding noted, right pedal and PT pulses remained strong. Dr. Priest was paged and notified of this and pt was assessed by Dr. Burton, who spoke to Dr. Priest and then said that pt could be discharged. Tylenol 500 mg 2 tabs PO given. Pt stated that the pain was only when standing or walking. Angio-seal pamphlet, stent card, new filled Rx Brilinta given to pt. Good appetite, very pleasant, cooperative.

## 2023-06-23 NOTE — Clinical Note
The first balloon was inserted into the circumflex.Max pressure = 12 sena. Total duration = 15 seconds.     Max pressure = 12 sena. Total duration = 10 seconds.    Balloon reinflated a second time: Max pressure = 12 sena. Total duration = 10 seconds. Max pressure = 8 sena. Total duration = 15 seconds.  Balloon reinflated a fourth time: Max pressure = 10 sena. Total duration = 10 seconds.

## 2023-06-23 NOTE — DISCHARGE INSTRUCTIONS
Cardiac Stent Discharge Instructions - Femoral    After you go home:    Have an adult stay with you until tomorrow.  Drink extra fluids for 2 days.  You may resume your normal diet.  No smoking       For 24 hours - due to the sedation you received:  Relax and take it easy.  Do NOT make any important or legal decisions.  Do NOT drive or operate machines at home or at work.  Do NOT drink alcohol.    Care of Groin Puncture Site:    For the first 24 hrs - check the puncture site every 1-2 hours while awake.  For 2 days, when you cough, sneeze, laugh or move your bowels, hold your hand over the puncture site and press firmly.  Remove the bandaid after 24 hours. If there is minor oozing, apply another bandaid and remove it after 12 hours.  It is normal to have a small bruise or pea size lump at the site.  You may shower tomorrow. Do NOT take a bath, or use a hot tub or pool for at least 3 days. Do NOT scrub the site. Do not use lotion or powder near the puncture site.     Activity:            For 2 days:  No stooping or squatting  Do NOT do any heavy activity such as exercise, lifting, or straining.   No housework, yard work or any activity that make you sweat  Do NOT lift more than 10 pounds    Bleeding:    If you start bleeding from the site in your groin, lie down flat and press firmly on/above the site for 10 minutes.   Once bleeding stops, lay flat for 2 hours.   Call [facility] as soon as you can.       Call 911 right away if you have heavy bleeding or bleeding that does not stop.      Medicines:    If you are taking an antiplatelet medication such as Plavix, Brilinta or Effient, do not stop taking it until you talk to your cardiologist.      Take your medications, including blood thinners, unless your provider tells you not to.    If you take Coumadin (Warfarin), have your INR checked by your provider in  3-5 days. Call your clinic to schedule this.  If you have stopped any medicines, check with your provider about  when to restart them.    Follow Up Appointments:    Follow up with [facility] Nurse Practitioner at [facility] of patient preference in 7-10 days.  Cardiac Rehab will contact you for follow up care.    Call the clinic if:    You have increased pain or a large or growing hard lump around the site.  The site is red, swollen, hot or tender.  Blood or fluid is draining from the site.  You have chills or a fever greater than 101 F (38 C).  Your leg feels numb, cool or changes color.  You have hives, a rash or unusual itching.  New pain in the back or belly that you cannot control with Tylenol.  Any questions or concerns.      Other Instructions:    If you received a stent - carry your stent card with you at all times.      [facility] [place]:    [phone] [facility] (7 days a week)

## 2023-06-23 NOTE — Clinical Note
The first balloon was inserted into the circumflex.Max pressure = 8 sena. Total duration = 15 seconds.     Max pressure = 6 sena. Total duration = 10 seconds.    Balloon reinflated a second time: Max pressure = 6 sena. Total duration = 10 seconds.  Balloon reinflated a third time: Max pressure = 16 sena. Total duration = 30 seconds.

## 2023-06-25 ENCOUNTER — TELEPHONE (OUTPATIENT)
Dept: CARDIOLOGY | Facility: CLINIC | Age: 63
End: 2023-06-25
Payer: COMMERCIAL

## 2023-06-25 NOTE — TELEPHONE ENCOUNTER
"Patient called with concern for \"hives\" in thighs and arms starting around noon yesterday, skin feels itchy but no respiratory distress. No access site complications, had recent angiogram with Dr. Pérez 6/23 s/p PCI, only new medication is ticagrelor. In the absence of red flag symptoms suggested OTC benadryl and letting us know if symptoms worsen.   "

## 2023-06-26 ENCOUNTER — TELEPHONE (OUTPATIENT)
Dept: CARDIOLOGY | Facility: CLINIC | Age: 63
End: 2023-06-26
Payer: COMMERCIAL

## 2023-06-26 DIAGNOSIS — I25.10 CAD (CORONARY ARTERY DISEASE): Primary | ICD-10-CM

## 2023-06-26 NOTE — TELEPHONE ENCOUNTER
Patient was admitted to [facility] on 6/23/23 for OP LHC due to abnormal stress test stable known CAD.    PMH: mixed hyperlipidemia with elevated triglycerides and low HDL, impaired fasting glucose, hypothyroidism, GERD, erectile dysfunction, benign prostatic hypertrophy with elevated PSA, central obesity all consistent with metabolic syndrome, venous varicosities and periodic peripheral edema. He is a lifelong nonsmoker.    6/23/23: Coronary angiogram via RFA showed prox LAD to Mid LAD lesion is 90% stenosed. Ost LAD to Prox LAD lesion is 20% stenosed. 1st Diag lesion  is 40% stenosed. Ost Cx to Mid Cx lesion is 90% stenosed. RPDA lesion is 70% stenosed. Successful ANTONELLA  to LAD and ostial LCX. If symptoms could PCI PDA. Findings discussed with Dr. Pérez. Multivessel PCI vs CABG offered.    Pt was started on Brilinta and PTA ASA was continued at time of discharge.    Called patient to discuss any post hospital d/c questions, review medication changes, and confirm f/u appts. Patient denied any questions regarding new medications or changes to PTA medications.     Pt WAS NOT discharged with PRN SL NTG.    Pt is scheduled for an OV on 6/30/23 at 1355 with Dr. Pérez at our [facility].    Cardiac rehab needs to be scheduled.    Writer attempted to call pt for a cardiology post discharge phone call, but no answer. VM left to return my phone call. Reminder left of scheduled OV as above. TONIE Tineo RN.

## 2023-06-27 LAB
ATRIAL RATE - MUSE: 53 BPM
ATRIAL RATE - MUSE: 58 BPM
DIASTOLIC BLOOD PRESSURE - MUSE: NORMAL MMHG
DIASTOLIC BLOOD PRESSURE - MUSE: NORMAL MMHG
INTERPRETATION ECG - MUSE: NORMAL
INTERPRETATION ECG - MUSE: NORMAL
P AXIS - MUSE: 45 DEGREES
P AXIS - MUSE: 58 DEGREES
PR INTERVAL - MUSE: 178 MS
PR INTERVAL - MUSE: 180 MS
QRS DURATION - MUSE: 82 MS
QRS DURATION - MUSE: 86 MS
QT - MUSE: 426 MS
QT - MUSE: 446 MS
QTC - MUSE: 418 MS
QTC - MUSE: 418 MS
R AXIS - MUSE: 26 DEGREES
R AXIS - MUSE: 38 DEGREES
SYSTOLIC BLOOD PRESSURE - MUSE: NORMAL MMHG
SYSTOLIC BLOOD PRESSURE - MUSE: NORMAL MMHG
T AXIS - MUSE: 38 DEGREES
T AXIS - MUSE: 49 DEGREES
VENTRICULAR RATE- MUSE: 53 BPM
VENTRICULAR RATE- MUSE: 58 BPM

## 2023-06-27 RX ORDER — NITROGLYCERIN 0.4 MG/1
TABLET SUBLINGUAL
Qty: 30 TABLET | Refills: 0 | Status: SHIPPED | OUTPATIENT
Start: 2023-06-27

## 2023-06-27 NOTE — TELEPHONE ENCOUNTER
"Writer returned pt's phone call.    Called patient to discuss any post hospital d/c questions, review medication changes, and confirm f/u appts. Patient denied any questions regarding new medications or changes to PTA medications.     RN confirmed with patient that he was d/c with an adequate supply of the antiplatelet Brilinta, and reminded of importance of taking without interruption. Pt states he developed hives to his inner thighs and the back of his knees the day after discharge. Spoke to cardiologist on call and recommended Benadryl. Pt states hives have slowly been resolving, \"and not so itchy.\" Denies any lip or tongue swelling or other rash/hives. Pt also states has a hx of nosebleed in the past requiring cauterization by ENT. No nosebleeds, black or bloody stools since starting Brilinta. Will discuss further with Dr. Pérez during follow up OV.    Pt DOES NOT have an Rx for PRN SL Nitroglycerin. Per chart review, no known allergy to nitrates. Pt has a hx of ED and has trialed Cialis and Viagra in the past, but could not tolerate secondary to HA, so no longer using. Has recently been trying injectable Tri Mix compound and has an Rx waiting for him at his pharmacy. Pt will discuss further use with Dr. Pérez and states PRN NTG more important. Writer instructed pt on PRN SL Nitroglycerin, including indications, storage and expiration period once bottle opened, administration, expected side effects and when to call the clinic vs 911. Pt verbalized understanding and RX escribed to pt's [place] [facility] per his request.    Pt also c/o intermittent dry cough possibly related to Lisinopril.    Patient denied any chest pain, fever or light headedness. Pt does c/o some SOB during activities, and sometimes while asleep in which he feels like he has to take a deep breath. Denies chest pain or edema. Explained to pt this can be a common side effect of Brilinta and will continue to monitor and " discuss further with Dr. Pérez during his follow up OV.    RFA cardiac cath site is without bleeding, swelling, redness or signs of infection.     RN confirmed with patient that he is scheduled for an OV on 6/30/23 at 1355 with Dr. Pérez at our [facility].    Cardiac rehab is scheduled on 7/28/23 at 0945 in [place].    Patient advised to call clinic with any cardiac related questions or concerns prior to this rohit't. Patient verbalized understanding and agreed with plan. TONIE Tineo RN.

## 2023-06-28 NOTE — TELEPHONE ENCOUNTER
"Writer sent message to Humaira Waite,  Clinical Pharmacy Supervisor regarding Tri Mix compound injectable for ED. No interactions with NTG as per Team message below.    \"The 3 most commonly used medications for injection therapy are Trimix, Bimix, and papaverine. Most men begin injection therapy with Trimix, which is a mixture of 3 ingredients: alprostadil, phentolamine, and papaverine.\"        \"OK, so no interaction with PRN SL NTG like Viagra, Cialis, Revatio or Levitra?\"    [Yesterday 4:25 PM] Humaira Waite    \"correct\"  "

## 2023-06-30 ENCOUNTER — OFFICE VISIT (OUTPATIENT)
Dept: CARDIOLOGY | Facility: CLINIC | Age: 63
End: 2023-06-30
Payer: COMMERCIAL

## 2023-06-30 VITALS
SYSTOLIC BLOOD PRESSURE: 111 MMHG | DIASTOLIC BLOOD PRESSURE: 66 MMHG | OXYGEN SATURATION: 100 % | HEART RATE: 63 BPM | WEIGHT: 206 LBS | BODY MASS INDEX: 32.26 KG/M2

## 2023-06-30 DIAGNOSIS — I25.118 CORONARY ARTERY DISEASE OF NATIVE ARTERY OF NATIVE HEART WITH STABLE ANGINA PECTORIS (H): Primary | ICD-10-CM

## 2023-06-30 DIAGNOSIS — R60.0 PERIPHERAL EDEMA: ICD-10-CM

## 2023-06-30 DIAGNOSIS — E66.09 CLASS 1 OBESITY DUE TO EXCESS CALORIES WITH SERIOUS COMORBIDITY AND BODY MASS INDEX (BMI) OF 32.0 TO 32.9 IN ADULT: ICD-10-CM

## 2023-06-30 DIAGNOSIS — T14.8XXA HEMATOMA OF SKIN: ICD-10-CM

## 2023-06-30 DIAGNOSIS — R73.01 IMPAIRED FASTING GLUCOSE: ICD-10-CM

## 2023-06-30 DIAGNOSIS — E78.2 MIXED HYPERCHOLESTEROLEMIA AND HYPERTRIGLYCERIDEMIA: ICD-10-CM

## 2023-06-30 DIAGNOSIS — I87.2 VENOUS (PERIPHERAL) INSUFFICIENCY: ICD-10-CM

## 2023-06-30 DIAGNOSIS — E66.811 CLASS 1 OBESITY DUE TO EXCESS CALORIES WITH SERIOUS COMORBIDITY AND BODY MASS INDEX (BMI) OF 32.0 TO 32.9 IN ADULT: ICD-10-CM

## 2023-06-30 PROCEDURE — 99215 OFFICE O/P EST HI 40 MIN: CPT | Performed by: INTERNAL MEDICINE

## 2023-06-30 RX ORDER — CLOPIDOGREL BISULFATE 75 MG/1
75 TABLET ORAL DAILY
Qty: 90 TABLET | Refills: 4 | Status: SHIPPED | OUTPATIENT
Start: 2023-06-30 | End: 2023-07-06

## 2023-06-30 NOTE — PROGRESS NOTES
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  I stented his sister as well.     Renny has a past medical history significant for mixed hyperlipidemia with elevated triglycerides and low HDL, impaired fasting glucose, hypothyroidism, GERD, erectile dysfunction, benign prostatic hypertrophy with elevated PSA, central obesity all consistent with metabolic syndrome, venous varicosities and periodic peripheral edema.  He is a lifelong nonsmoker.     He has had chronic stable exertional angina for which I been following him for a couple of years.  He always wanted a conservative course.  This year we did a CT angiogram demonstrating fairly heavily calcified vessels and potentially three-vessel coronary disease.  With this I was able to convince him to go to the Cath Lab.     There he was found to have three-vessel coronary disease.  Dr. Priest called me I was unable to look at the films as I was off but in discussion with the patient they decided to proceed with multivessel intervention.  I did review the angiogram upon my return and again today.  Circumflex and left anterior descending artery was successfully stented.  Appears to be dual PDA's both with ostial lesions.  I agree initial treatment with medical management is appropriate.    Renny returns with many questions.  He was concerned that Dr. Priest was not in the room when the fellow began the case and insisted on Dr. Priest being present.  He does not remember having a conversation about surgery.  He states he had severe pain in his groin and did have a hematoma postprocedure.  He now thinks he is more short of breath with going up and down stairs and even at rest.  He is frustrated that he cannot get into rehab till the end of the month.  He also has concerns about nitroglycerin and the medication he is taken for his erectile dysfunction.  He describes a rash that he developed in the few days  after procedure that is now resolving.  He took 1 Benadryl.    He is not having any chest arm neck jaw or shoulder discomfort.  No lightheadedness dizziness syncope or near syncope.  He states he did have a little bit of a nosebleed and he is concerned because he read the package information about Brilinta.     ASSESSMENT AND PLAN: at this time I suspect his baseline background shortness of breath is most likely Brilinta.  I have told him we will change him to clopidogrel having him take 4 pills with his first dose.  If his shortness of breath persists after for 5 days I would then set him up for a stress echocardiogram to evaluate for any ischemia.  It does not sound like he has had any acute stent closure.  I told him that the shortness of breath is better than I would schedule a stress test for a couple of months down the road and then we will evaluate the significance of his right coronary stenoses.  Obviously if he develops anginal symptoms or has persistent anginal symptoms then we will consider going back and intervening on his PDA's.     No symptoms to suggest heart failure or significant arrhythmia.     His cholesterol is well treated with an LDL of 43.     To look for other risk factors I checked LP(a) and C-reactive protein both of which were normal.  Glucose was mildly elevated at 102 consistent with impaired fasting glucose.     As stated weight is up body mass index is now 33.     We talked about his hematoma.  He does not appear to have a bruit I do not think we need to an ultrasound I told him this should get better over time.    Is possible his rash is due to contrast as it appears to be improving.  I do not think it is due to Brilinta.     I discussed with cardiac rehab there are no openings sooner as they are currently overwhelmed with the number of patients.    We did give him a nitroglycerin prescription.        Thank you for allowing me to participate in his care.     Jude Pérez MD,  [facility]    Today's clinic visit entailed:  Review of the result(s) of each unique test - Lab work, coronary angiogram  The following tests were independently interpreted by me as noted in my documentation: Coronary angiogram  Ordering of each unique test  Prescription drug management  55 minutes spent by me on the date of the encounter doing chart review, history and exam, documentation and further activities per the note  Provider  Link to [facility] Help Grid     The level of medical decision making during this visit was of moderate complexity.      Orders Placed This Encounter   Procedures     Follow-Up with Cardiology     Exercise Stress Echocardiogram       Orders Placed This Encounter   Medications     clopidogrel (PLAVIX) 75 MG tablet     Sig: Take 1 tablet (75 mg) by mouth daily Take 4 pills first dose then once daily     Dispense:  90 tablet     Refill:  4       Medications Discontinued During This Encounter   Medication Reason     aspirin (ASA) 81 MG chewable tablet      ticagrelor (BRILINTA) 90 MG tablet          Encounter Diagnoses   Name Primary?     Coronary artery disease of native artery of native heart with stable angina pectoris (H) Yes     Venous (peripheral) insufficiency      Peripheral edema      Class 1 obesity due to excess calories with serious comorbidity and body mass index (BMI) of 32.0 to 32.9 in adult      Mixed hypercholesterolemia and hypertriglyceridemia      Impaired fasting glucose      Hematoma of skin        CURRENT MEDICATIONS:  Current Outpatient Medications   Medication Sig Dispense Refill     aspirin (ASA) 81 MG EC tablet Take 1 tablet (81 mg) by mouth daily       clopidogrel (PLAVIX) 75 MG tablet Take 1 tablet (75 mg) by mouth daily Take 4 pills first dose then once daily 90 tablet 4     famotidine (PEPCID) 20 MG tablet 1 TABLET 30 MINUTES BEFORE BEDTIME. ONCE A DAY ORALLY FOR 90 DAYS       finasteride (PROSCAR) 5 MG tablet TAKE 1 TABLET(5 MG) BY MOUTH DAILY 90 tablet 2      levothyroxine (SYNTHROID/LEVOTHROID) 88 MCG tablet Take 1 tablet (88 mcg) by mouth daily 90 tablet 3     melatonin 1 MG CAPS        metoprolol succinate ER (TOPROL XL) 25 MG 24 hr tablet Take 1 tablet (25 mg) by mouth daily 90 tablet 3     nitroGLYcerin (NITROSTAT) 0.4 MG sublingual tablet For chest pain place 1 tablet under the tongue every 5 minutes for 3 doses. If symptoms persist 5 minutes after 2nd dose call 911. 30 tablet 0     rosuvastatin (CRESTOR) 40 MG tablet Take 1 tablet (40 mg) by mouth daily 90 tablet 3     tamsulosin (FLOMAX) 0.4 MG capsule TAKE 1 CAPSULE(0.4 MG) BY MOUTH DAILY 30 MINUTES AFTER THE EVENING MEAL 90 capsule 2     Vitamin D (Cholecalciferol) 25 MCG (1000 UT) TABS Take 2,000 Units by mouth Nature made D 3         ALLERGIES     Allergies   Allergen Reactions     Dust Mites      Sneezing, water eyes       PAST MEDICAL HISTORY:  Past Medical History:   Diagnosis Date     Adenomatous polyp of transverse colon 08/2021    fu 7 years     BPH (benign prostatic hyperplasia) 2016    Dr. Gonzalez     Burning sensation of throat 2021    egd nl,     Chest pain 01/2014    neg est echo     Coronary artery disease of native artery of native heart with stable angina pectoris (H) 07/20/2021    based on symptoms 5/21 and positive est, then seen by [facility] and med mgmt     Elevated PSA 2016    Dr. Gonzalez, bx neg, seen by Dr Brown 2022, had mri 2021     Erectile dysfunction     Dr. Gonzalez     Gastritis      Hypercholesteremia      Hypothyroid 2007     Normal colonoscopy 2010     Vitamin D deficiency 2009    Vitamin D2/D3 of 19       PAST SURGICAL HISTORY:  Past Surgical History:   Procedure Laterality Date     ABDOMEN SURGERY  01/08/2021    Incisional Hernia     BIOPSY  09/2015    Prostate. Later PSA results were found good.     COLONOSCOPY  2010    No issue found.     CV CORONARY ANGIOGRAM N/A 6/23/2023    Procedure: Coronary Angiogram;  Surgeon: Chuck Priest MD;  Location: [facility] CARDIAC CATH LAB      CV LEFT HEART CATH N/A 6/23/2023    Procedure: Left Heart Catheterization;  Surgeon: Chuck Priest MD;  Location:  HEART CARDIAC CATH LAB     CV PCI N/A 6/23/2023    Procedure: Percutaneous Coronary Intervention;  Surgeon: Chuck Priest MD;  Location:  HEART CARDIAC CATH LAB     HERNIA REPAIR      Incisional Hernia     LAPAROSCOPIC CHOLECYSTECTOMY N/A 11/16/2015    Procedure: LAPAROSCOPIC CHOLECYSTECTOMY;  Surgeon: Sivakumar Burr MD;  Location:  [facility]     LAPAROSCOPIC HERNIORRHAPHY VENTRAL N/A 01/08/2021    Procedure: LAPAROSCOPIC VENTRAL HERNIA REPAIR WITH MESH;  Surgeon: Sivakumar Burr MD;  Location:  OR     PROSTATE SURGERY      Trus BX       FAMILY HISTORY:  Family History   Problem Relation Age of Onset     C.A.D. Mother      Arthritis Mother      Cancer Mother         ovarian     Coronary Artery Disease Mother         Angina, and Congestive heart failure     Other Cancer Mother         Ovarian     Osteoporosis Mother         After 70 years of age     Thyroid Disease Mother         Found low when 70+     Coronary Artery Disease Father         Angina     Hyperlipidemia Father      Hyperlipidemia Brother      Hypertension Brother      No Known Problems Sister      Cancer Maternal Grandfather         esophageal cancer     Other Cancer Maternal Grandfather         Esophagus     Cancer Paternal Grandmother         liver cancer     Other Cancer Paternal Grandmother         Liver     Cancer Paternal Grandfather         throat cancer     Other Cancer Paternal Grandfather         Throat     Hypertension Brother      Thyroid Disease Brother         Found low when 40+       SOCIAL HISTORY:  Social History     Socioeconomic History     Marital status:      Spouse name: None     Number of children: 2     Years of education: None     Highest education level: None   Occupational History     Occupation:      Employer: [employer]     Employer: [employer]   Tobacco  Use     Smoking status: Never     Smokeless tobacco: Never   Substance and Sexual Activity     Alcohol use: Never     Drug use: Never     Sexual activity: Yes     Partners: Female     Birth control/protection: Abstinence     Comment: New medicines have affected my general routine of life   Other Topics Concern     Parent/sibling w/ CABG, MI or angioplasty before 65F 55M? No       Review of Systems:  Skin:  not assessed       Eyes:  Positive for glasses    ENT:  Positive for hearing loss    Respiratory:  Positive for dyspnea on exertion;shortness of breath slightly more SOB after surgery.   Cardiovascular:    Positive for;edema edema in legs has gotten better  Gastroenterology: Positive for reflux;heartburn takes Rx for  Genitourinary:  not assessed      Musculoskeletal:  not assessed      Neurologic:  not assessed      Psychiatric:  not assessed      Heme/Lymph/Imm:  not assessed      Endocrine:  not assessed        Physical Exam:  Vitals: /66 (BP Location: Right arm, Patient Position: Sitting, Cuff Size: Adult Regular)   Pulse 63   Wt 93.4 kg (206 lb)   SpO2 100%   BMI 32.26 kg/m      Constitutional:  cooperative, alert and oriented, well developed, well nourished, in no acute distress obese      Skin:  warm and dry to the touch, no apparent skin lesions or masses noted          Head:  normocephalic, no masses or lesions        Eyes:  pupils equal and round, conjunctivae and lids unremarkable, sclera white, no xanthalasma, EOMS intact, no nystagmus        Lymph:      ENT:  no pallor or cyanosis, dentition good        Neck:  carotid pulses are full and equal bilaterally;no carotid bruit        Respiratory:  normal breath sounds, clear to auscultation, normal A-P diameter, normal symmetry, normal respiratory excursion, no use of accessory muscles         Cardiac: regular rhythm;no murmurs, gallops or rubs detected                pulses full and equal                                        GI:    obese       Extremities and Muscular Skeletal:  no edema;no spinal abnormalities noted;normal muscle strength and tone   varicose vein RLE edema;trace   Right groin small hematoma with ecchymoses no bruit.    Neurological:  no gross motor deficits        Psych:  affect appropriate, oriented to time, person and place Anxious      CC  No referring provider defined for this encounter.

## 2023-06-30 NOTE — LETTER
6/30/2023    Alejandro Lewis MD  [address]  [place] [address]    RE: Sarkis Darnell       Dear Colleague,     I had the pleasure of seeing Sarkis Darnell in the [facility].  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  I stented his sister as well.     Renny has a past medical history significant for mixed hyperlipidemia with elevated triglycerides and low HDL, impaired fasting glucose, hypothyroidism, GERD, erectile dysfunction, benign prostatic hypertrophy with elevated PSA, central obesity all consistent with metabolic syndrome, venous varicosities and periodic peripheral edema.  He is a lifelong nonsmoker.     He has had chronic stable exertional angina for which I been following him for a couple of years.  He always wanted a conservative course.  This year we did a CT angiogram demonstrating fairly heavily calcified vessels and potentially three-vessel coronary disease.  With this I was able to convince him to go to the Cath Lab.     There he was found to have three-vessel coronary disease.  Dr. Priest called me I was unable to look at the films as I was off but in discussion with the patient they decided to proceed with multivessel intervention.  I did review the angiogram upon my return and again today.  Circumflex and left anterior descending artery was successfully stented.  Appears to be dual PDA's both with ostial lesions.  I agree initial treatment with medical management is appropriate.    Renny returns with many questions.  He was concerned that Dr. Priest was not in the room when the fellow began the case and insisted on Dr. Priest being present.  He does not remember having a conversation about surgery.  He states he had severe pain in his groin and did have a hematoma postprocedure.  He now thinks he is more short of breath with going up and down stairs and even at rest.  He is frustrated that he  cannot get into rehab till the end of the month.  He also has concerns about nitroglycerin and the medication he is taken for his erectile dysfunction.  He describes a rash that he developed in the few days after procedure that is now resolving.  He took 1 Benadryl.    He is not having any chest arm neck jaw or shoulder discomfort.  No lightheadedness dizziness syncope or near syncope.  He states he did have a little bit of a nosebleed and he is concerned because he read the package information about Brilinta.     ASSESSMENT AND PLAN: at this time I suspect his baseline background shortness of breath is most likely Brilinta.  I have told him we will change him to clopidogrel having him take 4 pills with his first dose.  If his shortness of breath persists after for 5 days I would then set him up for a stress echocardiogram to evaluate for any ischemia.  It does not sound like he has had any acute stent closure.  I told him that the shortness of breath is better than I would schedule a stress test for a couple of months down the road and then we will evaluate the significance of his right coronary stenoses.  Obviously if he develops anginal symptoms or has persistent anginal symptoms then we will consider going back and intervening on his PDA's.     No symptoms to suggest heart failure or significant arrhythmia.     His cholesterol is well treated with an LDL of 43.     To look for other risk factors I checked LP(a) and C-reactive protein both of which were normal.  Glucose was mildly elevated at 102 consistent with impaired fasting glucose.     As stated weight is up body mass index is now 33.     We talked about his hematoma.  He does not appear to have a bruit I do not think we need to an ultrasound I told him this should get better over time.    Is possible his rash is due to contrast as it appears to be improving.  I do not think it is due to Brilinta.     I discussed with cardiac rehab there are no openings  sooner as they are currently overwhelmed with the number of patients.    We did give him a nitroglycerin prescription.        Thank you for allowing me to participate in his care.     Jude Pérez MD, [facility]    Today's clinic visit entailed:  Review of the result(s) of each unique test - Lab work, coronary angiogram  The following tests were independently interpreted by me as noted in my documentation: Coronary angiogram  Ordering of each unique test  Prescription drug management  55 minutes spent by me on the date of the encounter doing chart review, history and exam, documentation and further activities per the note  Provider  Link to [facility] Help Grid     The level of medical decision making during this visit was of moderate complexity.      Orders Placed This Encounter   Procedures    Follow-Up with Cardiology    Exercise Stress Echocardiogram       Orders Placed This Encounter   Medications    clopidogrel (PLAVIX) 75 MG tablet     Sig: Take 1 tablet (75 mg) by mouth daily Take 4 pills first dose then once daily     Dispense:  90 tablet     Refill:  4       Medications Discontinued During This Encounter   Medication Reason    aspirin (ASA) 81 MG chewable tablet     ticagrelor (BRILINTA) 90 MG tablet          Encounter Diagnoses   Name Primary?    Coronary artery disease of native artery of native heart with stable angina pectoris (H) Yes    Venous (peripheral) insufficiency     Peripheral edema     Class 1 obesity due to excess calories with serious comorbidity and body mass index (BMI) of 32.0 to 32.9 in adult     Mixed hypercholesterolemia and hypertriglyceridemia     Impaired fasting glucose     Hematoma of skin        CURRENT MEDICATIONS:  Current Outpatient Medications   Medication Sig Dispense Refill    aspirin (ASA) 81 MG EC tablet Take 1 tablet (81 mg) by mouth daily      clopidogrel (PLAVIX) 75 MG tablet Take 1 tablet (75 mg) by mouth daily Take 4 pills first dose then once daily 90 tablet 4     famotidine (PEPCID) 20 MG tablet 1 TABLET 30 MINUTES BEFORE BEDTIME. ONCE A DAY ORALLY FOR 90 DAYS      finasteride (PROSCAR) 5 MG tablet TAKE 1 TABLET(5 MG) BY MOUTH DAILY 90 tablet 2    levothyroxine (SYNTHROID/LEVOTHROID) 88 MCG tablet Take 1 tablet (88 mcg) by mouth daily 90 tablet 3    melatonin 1 MG CAPS       metoprolol succinate ER (TOPROL XL) 25 MG 24 hr tablet Take 1 tablet (25 mg) by mouth daily 90 tablet 3    nitroGLYcerin (NITROSTAT) 0.4 MG sublingual tablet For chest pain place 1 tablet under the tongue every 5 minutes for 3 doses. If symptoms persist 5 minutes after 2nd dose call 911. 30 tablet 0    rosuvastatin (CRESTOR) 40 MG tablet Take 1 tablet (40 mg) by mouth daily 90 tablet 3    tamsulosin (FLOMAX) 0.4 MG capsule TAKE 1 CAPSULE(0.4 MG) BY MOUTH DAILY 30 MINUTES AFTER THE EVENING MEAL 90 capsule 2    Vitamin D (Cholecalciferol) 25 MCG (1000 UT) TABS Take 2,000 Units by mouth Nature made D 3         ALLERGIES     Allergies   Allergen Reactions    Dust Mites      Sneezing, water eyes       PAST MEDICAL HISTORY:  Past Medical History:   Diagnosis Date    Adenomatous polyp of transverse colon 08/2021    fu 7 years    BPH (benign prostatic hyperplasia) 2016    Dr. Gonzalez    Burning sensation of throat 2021    egd nl,    Chest pain 01/2014    neg est echo    Coronary artery disease of native artery of native heart with stable angina pectoris (H) 07/20/2021    based on symptoms 5/21 and positive est, then seen by [facility] and med mgmt    Elevated PSA 2016    Dr. Gonzalez, bx neg, seen by Dr Brown 2022, had mri 2021    Erectile dysfunction     Dr. Gonzalez    Gastritis     Hypercholesteremia     Hypothyroid 2007    Normal colonoscopy 2010    Vitamin D deficiency 2009    Vitamin D2/D3 of 19       PAST SURGICAL HISTORY:  Past Surgical History:   Procedure Laterality Date    ABDOMEN SURGERY  01/08/2021    Incisional Hernia    BIOPSY  09/2015    Prostate. Later PSA results were found good.    COLONOSCOPY  2010     No issue found.    CV CORONARY ANGIOGRAM N/A 6/23/2023    Procedure: Coronary Angiogram;  Surgeon: Chuck Priest MD;  Location:  HEART CARDIAC CATH LAB    CV LEFT HEART CATH N/A 6/23/2023    Procedure: Left Heart Catheterization;  Surgeon: Chuck Priest MD;  Location:  HEART CARDIAC CATH LAB    CV PCI N/A 6/23/2023    Procedure: Percutaneous Coronary Intervention;  Surgeon: Chuck Priest MD;  Location:  HEART CARDIAC CATH LAB    HERNIA REPAIR      Incisional Hernia    LAPAROSCOPIC CHOLECYSTECTOMY N/A 11/16/2015    Procedure: LAPAROSCOPIC CHOLECYSTECTOMY;  Surgeon: Sivakumar Burr MD;  Location:  SD    LAPAROSCOPIC HERNIORRHAPHY VENTRAL N/A 01/08/2021    Procedure: LAPAROSCOPIC VENTRAL HERNIA REPAIR WITH MESH;  Surgeon: Sivakumar Burr MD;  Location:  OR    PROSTATE SURGERY      Trus BX       FAMILY HISTORY:  Family History   Problem Relation Age of Onset    C.A.D. Mother     Arthritis Mother     Cancer Mother         ovarian    Coronary Artery Disease Mother         Angina, and Congestive heart failure    Other Cancer Mother         Ovarian    Osteoporosis Mother         After 70 years of age    Thyroid Disease Mother         Found low when 70+    Coronary Artery Disease Father         Angina    Hyperlipidemia Father     Hyperlipidemia Brother     Hypertension Brother     No Known Problems Sister     Cancer Maternal Grandfather         esophageal cancer    Other Cancer Maternal Grandfather         Esophagus    Cancer Paternal Grandmother         liver cancer    Other Cancer Paternal Grandmother         Liver    Cancer Paternal Grandfather         throat cancer    Other Cancer Paternal Grandfather         Throat    Hypertension Brother     Thyroid Disease Brother         Found low when 40+       SOCIAL HISTORY:  Social History     Socioeconomic History    Marital status:      Spouse name: None    Number of children: 2    Years of education: None    Highest  education level: None   Occupational History    Occupation: quality engineer     Employer: [employer]     Employer: [employer]   Tobacco Use    Smoking status: Never    Smokeless tobacco: Never   Substance and Sexual Activity    Alcohol use: Never    Drug use: Never    Sexual activity: Yes     Partners: Female     Birth control/protection: Abstinence     Comment: New medicines have affected my general routine of life   Other Topics Concern    Parent/sibling w/ CABG, MI or angioplasty before 65F 55M? No       Review of Systems:  Skin:  not assessed       Eyes:  Positive for glasses    ENT:  Positive for hearing loss    Respiratory:  Positive for dyspnea on exertion;shortness of breath slightly more SOB after surgery.   Cardiovascular:    Positive for;edema edema in legs has gotten better  Gastroenterology: Positive for reflux;heartburn takes Rx for  Genitourinary:  not assessed      Musculoskeletal:  not assessed      Neurologic:  not assessed      Psychiatric:  not assessed      Heme/Lymph/Imm:  not assessed      Endocrine:  not assessed        Physical Exam:  Vitals: /66 (BP Location: Right arm, Patient Position: Sitting, Cuff Size: Adult Regular)   Pulse 63   Wt 93.4 kg (206 lb)   SpO2 100%   BMI 32.26 kg/m      Constitutional:  cooperative, alert and oriented, well developed, well nourished, in no acute distress obese      Skin:  warm and dry to the touch, no apparent skin lesions or masses noted          Head:  normocephalic, no masses or lesions        Eyes:  pupils equal and round, conjunctivae and lids unremarkable, sclera white, no xanthalasma, EOMS intact, no nystagmus        Lymph:      ENT:  no pallor or cyanosis, dentition good        Neck:  carotid pulses are full and equal bilaterally;no carotid bruit        Respiratory:  normal breath sounds, clear to auscultation, normal A-P diameter, normal symmetry, normal respiratory excursion, no use of accessory muscles         Cardiac: regular rhythm;no  murmurs, gallops or rubs detected                pulses full and equal                                        GI:    obese      Extremities and Muscular Skeletal:  no edema;no spinal abnormalities noted;normal muscle strength and tone   varicose vein RLE edema;trace   Right groin small hematoma with ecchymoses no bruit.    Neurological:  no gross motor deficits        Psych:  affect appropriate, oriented to time, person and place Anxious      CC  No referring provider defined for this encounter.                  Thank you for allowing me to participate in the care of your patient.      Sincerely,     Jude Pérez MD     [facility]

## 2023-07-06 DIAGNOSIS — I25.118 CORONARY ARTERY DISEASE OF NATIVE ARTERY OF NATIVE HEART WITH STABLE ANGINA PECTORIS (H): ICD-10-CM

## 2023-07-06 RX ORDER — CLOPIDOGREL BISULFATE 75 MG/1
75 TABLET ORAL DAILY
Qty: 93 TABLET | Refills: 4 | Status: SHIPPED | OUTPATIENT
Start: 2023-07-06 | End: 2023-09-15

## 2023-07-28 ENCOUNTER — HOSPITAL ENCOUNTER (OUTPATIENT)
Dept: CARDIAC REHAB | Facility: CLINIC | Age: 63
Discharge: HOME | End: 2023-07-28
Attending: INTERNAL MEDICINE
Payer: COMMERCIAL

## 2023-07-28 DIAGNOSIS — R94.39 ABNORMAL CARDIOVASCULAR STRESS TEST: ICD-10-CM

## 2023-07-28 PROCEDURE — 93798 PHYS/QHP OP CAR RHAB W/ECG: CPT

## 2023-07-28 PROCEDURE — 93797 PHYS/QHP OP CAR RHAB WO ECG: CPT | Mod: 59

## 2023-08-08 ENCOUNTER — HOSPITAL ENCOUNTER (OUTPATIENT)
Dept: CARDIAC REHAB | Facility: CLINIC | Age: 63
Discharge: HOME | End: 2023-08-08
Attending: INTERNAL MEDICINE
Payer: COMMERCIAL

## 2023-08-08 PROCEDURE — 93798 PHYS/QHP OP CAR RHAB W/ECG: CPT

## 2023-08-24 ENCOUNTER — HOSPITAL ENCOUNTER (OUTPATIENT)
Dept: CARDIAC REHAB | Facility: CLINIC | Age: 63
Discharge: HOME | End: 2023-08-24
Attending: INTERNAL MEDICINE
Payer: COMMERCIAL

## 2023-08-24 PROCEDURE — 93798 PHYS/QHP OP CAR RHAB W/ECG: CPT

## 2023-08-25 ENCOUNTER — HOSPITAL ENCOUNTER (OUTPATIENT)
Dept: CARDIAC REHAB | Facility: CLINIC | Age: 63
Discharge: HOME | End: 2023-08-25
Attending: INTERNAL MEDICINE
Payer: COMMERCIAL

## 2023-08-25 PROCEDURE — 93798 PHYS/QHP OP CAR RHAB W/ECG: CPT | Performed by: REHABILITATION PRACTITIONER

## 2023-09-08 ENCOUNTER — VIRTUAL VISIT (OUTPATIENT)
Dept: FAMILY MEDICINE | Facility: CLINIC | Age: 63
End: 2023-09-08
Payer: COMMERCIAL

## 2023-09-08 ENCOUNTER — MYC MEDICAL ADVICE (OUTPATIENT)
Dept: FAMILY MEDICINE | Facility: CLINIC | Age: 63
End: 2023-09-08

## 2023-09-08 ENCOUNTER — NURSE TRIAGE (OUTPATIENT)
Dept: FAMILY MEDICINE | Facility: CLINIC | Age: 63
End: 2023-09-08

## 2023-09-08 DIAGNOSIS — R05.8 DRY COUGH: ICD-10-CM

## 2023-09-08 DIAGNOSIS — U07.1 INFECTION DUE TO 2019 NOVEL CORONAVIRUS: Primary | ICD-10-CM

## 2023-09-08 DIAGNOSIS — R06.2 WHEEZING: ICD-10-CM

## 2023-09-08 PROCEDURE — 99442 PR PHYSICIAN TELEPHONE EVALUATION 11-20 MIN: CPT | Mod: 95 | Performed by: PHYSICIAN ASSISTANT

## 2023-09-08 RX ORDER — ALBUTEROL SULFATE 90 UG/1
2 AEROSOL, METERED RESPIRATORY (INHALATION) EVERY 6 HOURS PRN
Qty: 18 G | Refills: 0 | Status: SHIPPED | OUTPATIENT
Start: 2023-09-08 | End: 2023-10-07

## 2023-09-08 RX ORDER — BENZONATATE 200 MG/1
200 CAPSULE ORAL 3 TIMES DAILY PRN
Qty: 30 CAPSULE | Refills: 0 | Status: SHIPPED | OUTPATIENT
Start: 2023-09-08 | End: 2023-10-09

## 2023-09-08 NOTE — TELEPHONE ENCOUNTER
Additional Information    Negative: SEVERE difficulty breathing (e.g., struggling for each breath, speaks in single words)    Negative: Difficult to awaken or acting confused (e.g., disoriented, slurred speech)    Negative: Bluish (or gray) lips or face now    Negative: Shock suspected (e.g., cold/pale/clammy skin, too weak to stand, low BP, rapid pulse)    Negative: Sounds like a life-threatening emergency to the triager    Negative: SEVERE or constant chest pain or pressure  (Exception: Mild central chest pain, present only when coughing.)    Negative: MODERATE difficulty breathing (e.g., speaks in phrases, SOB even at rest, pulse 100-120)    Negative: Headache and stiff neck (can't touch chin to chest)    Negative: Oxygen level (e.g., pulse oximetry) 90% or lower    Protocols used: Coronavirus (COVID-19) Diagnosed or [id number]

## 2023-09-08 NOTE — CONFIDENTIAL NOTE
RN COVID TREATMENT VISIT  09/08/23    The patient has been triaged and does not require a higher level of care.    Sarkis Darnell  63 year old  Current weight? 206 lb    Has the patient been seen by a primary care provider at an [facility] or [facility] within the past two years? Yes.   Have you been in close proximity to/do you have a known exposure to a person with a confirmed case of influenza? No.     General treatment eligibility:  Date of positive COVID test (PCR or at home)?  9/8/23    Are you or have you been hospitalized for this COVID-19 infection? No.   Have you received monoclonal antibodies or antiviral treatment for COVID-19 since this positive test? No.   Do you have any of the following conditions that place you at risk of being very sick from COVID-19?   - Age 50 years or older  Yes, patient has at least one high risk condition as noted above.     Current COVID symptoms:   - fever or chills  - cough  - congestion or runny nose  Yes. Patient has at least one symptom as selected.     How many days since symptoms started? 5 days or less. Established patient, 12 years or older weighing at least 88.2 lbs, who has symptoms that started in the past 5 days, has not been hospitalized nor received treatment already, and is at risk for being very sick from COVID-19.     Treatment eligibility by RN:  Are you currently pregnant or nursing? No  Do you have a clinically significant hypersensitivity to nirmatrelvir or ritonavir, or toxic epidermal necrolysis (TEN) or Ren-Ravindra Syndrome? No  Do you have a history of hepatitis, any hepatic impairment on the Problem List (such as Child-Sandoval Class C, cirrhosis, fatty liver disease, alcoholic liver disease), or was the last liver lab (hepatic panel, ALT, AST, ALK Phos, bilirubin) elevated in the past 6 months? No  Do you have any history of severe renal impairment (eGFR < 30mL/min)? No    Is patient eligible to continue? Yes, patient  meets all eligibility requirements for the RN COVID treatment (as denoted by all no responses above).     Current Outpatient Medications   Medication Sig Dispense Refill    aspirin (ASA) 81 MG EC tablet Take 1 tablet (81 mg) by mouth daily      clopidogrel (PLAVIX) 75 MG tablet Take 1 tablet (75 mg) by mouth daily Take 4 pills first dose then once daily 93 tablet 4    famotidine (PEPCID) 20 MG tablet 1 TABLET 30 MINUTES BEFORE BEDTIME. ONCE A DAY ORALLY FOR 90 DAYS      finasteride (PROSCAR) 5 MG tablet TAKE 1 TABLET(5 MG) BY MOUTH DAILY 90 tablet 2    levothyroxine (SYNTHROID/LEVOTHROID) 88 MCG tablet Take 1 tablet (88 mcg) by mouth daily 90 tablet 3    melatonin 1 MG CAPS       metoprolol succinate ER (TOPROL XL) 25 MG 24 hr tablet Take 1 tablet (25 mg) by mouth daily 90 tablet 3    nitroGLYcerin (NITROSTAT) 0.4 MG sublingual tablet For chest pain place 1 tablet under the tongue every 5 minutes for 3 doses. If symptoms persist 5 minutes after 2nd dose call 911. 30 tablet 0    rosuvastatin (CRESTOR) 40 MG tablet Take 1 tablet (40 mg) by mouth daily 90 tablet 3    tamsulosin (FLOMAX) 0.4 MG capsule TAKE 1 CAPSULE(0.4 MG) BY MOUTH DAILY 30 MINUTES AFTER THE EVENING MEAL 90 capsule 2    Vitamin D (Cholecalciferol) 25 MCG (1000 UT) TABS Take 2,000 Units by mouth Nature made D 3         Medications from List 1 of the standing order (on medications that exclude the use of Paxlovid) that patient is taking: NONE. Is patient taking Carlos's Wort? No  Is patient taking [place]'s Wort or any meds from List 1? No.   Medications from List 2 of the standing order (on meds that provider needs to adjust) that patient is taking: clopidogrel (Plavix), explained a provider visit is necessary to discuss medication adjustments while taking Paxlovid. Is patient on any of the meds from List 2? Yes. Patient will be scheduled or transferred to a  at the end of this call.   Maria Antonia Ko RN

## 2023-09-08 NOTE — PROGRESS NOTES
"Darrell is a 63 year old who is being evaluated via a billable telephone visit.      What phone number would you like to be contacted at? [phone]   How would you like to obtain your AVS? Bryce    Distant Location (provider location):  On-site    Assessment & Plan     Infection due to 2019 novel coronavirus  3-4 days ago symptom onset.   Quarantine right now, discussed red flag symptoms   Not paxlovid candidate with plavix prescription. Discussed other options. Will treat conservatively at the moment and will reach out if worsening for montano follow up    Dry cough  Ongoing  - benzonatate (TESSALON) 200 MG capsule; Take 1 capsule (200 mg) by mouth 3 times daily as needed for cough    Wheezing  Ongoing  - albuterol (PROAIR HFA/PROVENTIL HFA/VENTOLIN HFA) 108 (90 Base) MCG/ACT inhaler; Inhale 2 puffs into the lungs every 6 hours as needed for shortness of breath, wheezing or cough    If not improving will call back/be seen in . Discussed chest xray in future if not improving.   expected course of disease discussed with patient.  Side effects of medications reviewed               BMI:   Estimated body mass index is 32.26 kg/m  as calculated from the following:    Height as of 6/23/23: 1.702 m (5' 7\").    Weight as of 6/30/23: 93.4 kg (206 lb).           ASHLEY SETHI PA-C  [facility]   Darrell is a 63 year old, presenting for the following health issues:  No chief complaint on file.      History of Present Illness       Reason for visit:  Covid    He eats 2-3 servings of fruits and vegetables daily.He consumes 1 sweetened beverage(s) daily.He exercises with enough effort to increase his heart rate 20 to 29 minutes per day.  He exercises with enough effort to increase his heart rate 3 or less days per week.   He is taking medications regularly.         COVID-19 Symptom Review  How many days ago did these symptoms start? A few days    Are any of the following symptoms significant " for you?  New or worsening difficulty breathing? No  Worsening cough? Yes, it's a dry cough.   Fever or chills? Yes, the highest temperature was 99.8   Headache: No  Sore throat: No, pt did notice pain last two days when trying to swallow, but has improved  Chest pain: No  Diarrhea: No  Body aches? No    What treatments has patient tried? Guaifenesin (mucinex)   Does patient live in a nursing home, group home, or shelter? No  Does patient have a way to get food/medications during quarantined? Yes, I have a friend or family member who can help me.            Review of Systems   Constitutional, HEENT, cardiovascular, pulmonary, gi and gu systems are negative, except as otherwise noted.        Objective           Vitals:  No vitals were obtained today due to virtual visit.    Physical Exam   healthy, alert, and no distress  PSYCH: Alert and oriented times 3; coherent speech, normal   rate and volume, able to articulate logical thoughts, able   to abstract reason, no tangential thoughts, no hallucinations   or delusions  His affect is normal  RESP: No cough, no audible wheezing, able to talk in full sentences  Remainder of exam unable to be completed due to telephone visits                Phone call duration: 14 minutes

## 2023-09-08 NOTE — CONFIDENTIAL NOTE
Patient transferred to central scheduling for virtual visit with provider.     Maria Antonia Ko RN  [facility]

## 2023-09-14 ENCOUNTER — MYC MEDICAL ADVICE (OUTPATIENT)
Dept: CARDIOLOGY | Facility: CLINIC | Age: 63
End: 2023-09-14
Payer: COMMERCIAL

## 2023-09-14 DIAGNOSIS — I25.118 CORONARY ARTERY DISEASE OF NATIVE ARTERY OF NATIVE HEART WITH STABLE ANGINA PECTORIS (H): ICD-10-CM

## 2023-09-15 ENCOUNTER — HOSPITAL ENCOUNTER (OUTPATIENT)
Dept: CARDIAC REHAB | Facility: CLINIC | Age: 63
Discharge: HOME | End: 2023-09-15
Attending: INTERNAL MEDICINE
Payer: COMMERCIAL

## 2023-09-15 PROCEDURE — 93798 PHYS/QHP OP CAR RHAB W/ECG: CPT | Performed by: REHABILITATION PRACTITIONER

## 2023-09-15 RX ORDER — CLOPIDOGREL BISULFATE 75 MG/1
75 TABLET ORAL DAILY
Qty: 93 TABLET | Refills: 4 | Status: SHIPPED | OUTPATIENT
Start: 2023-09-15

## 2023-09-15 NOTE — TELEPHONE ENCOUNTER
My chart message from patient:  Darrell Darnell OLIVIA [facility] Heart Team 2  Phone Number: [phone]     Short term supply was denied by [facility] because they had the information that I do not have any refills left on Plavix which is not true.  The label on the med. bottle from [facility] is in alignment with Dr. Pérez's order of 90 tablets with 3 refills. Currently, I am using the first supply of 90 day but somehow this is not what [facility]'s records show. They told me that they are not seeing any more refills and I should check with my doctor.    Best regards,  Darrell    Reply to patient:    Tylo, Mr. Darnell,  We have resent your script for the plavix. It should have been good, but sometimes [facility] has trouble with their computers. Hopefully, this will reset your refills.    Thank you  Team 2 R.N.s  [phone]

## 2023-10-04 ENCOUNTER — OFFICE VISIT (OUTPATIENT)
Dept: URGENT CARE | Facility: URGENT CARE | Age: 63
End: 2023-10-04
Payer: COMMERCIAL

## 2023-10-04 VITALS
BODY MASS INDEX: 32.89 KG/M2 | RESPIRATION RATE: 18 BRPM | DIASTOLIC BLOOD PRESSURE: 77 MMHG | HEART RATE: 65 BPM | SYSTOLIC BLOOD PRESSURE: 124 MMHG | TEMPERATURE: 98.6 F | WEIGHT: 210 LBS | OXYGEN SATURATION: 97 %

## 2023-10-04 DIAGNOSIS — J06.9 VIRAL UPPER RESPIRATORY TRACT INFECTION WITH COUGH: Primary | ICD-10-CM

## 2023-10-04 LAB
DEPRECATED S PYO AG THROAT QL EIA: NEGATIVE
GROUP A STREP BY PCR: NOT DETECTED

## 2023-10-04 PROCEDURE — 87651 STREP A DNA AMP PROBE: CPT | Performed by: PHYSICIAN ASSISTANT

## 2023-10-04 PROCEDURE — 99213 OFFICE O/P EST LOW 20 MIN: CPT | Performed by: PHYSICIAN ASSISTANT

## 2023-10-04 RX ORDER — BENZONATATE 100 MG/1
100 CAPSULE ORAL 3 TIMES DAILY PRN
Qty: 30 CAPSULE | Refills: 0 | Status: SHIPPED | OUTPATIENT
Start: 2023-10-04 | End: 2023-10-11

## 2023-10-04 NOTE — PROGRESS NOTES
URGENT CARE VISIT:    SUBJECTIVE:   Sarkis Darnell is a 63 year old male presenting with a chief complaint of cough - productive, wheezing, sore throat, and swollen glands.  Onset was 3 day(s) ago.   He denies the following symptoms: shortness of breath, vomiting, and diarrhea  Course of illness is same.    Treatment measures tried include lozenges tried with no relief of symptoms.  Predisposing factors include None.    PMH:   Past Medical History:   Diagnosis Date    Adenomatous polyp of transverse colon 08/2021    fu 7 years    BPH (benign prostatic hyperplasia) 2016    Dr. Gonzalez    Burning sensation of throat 2021    egd nl,    Chest pain 01/2014    neg est echo    Coronary artery disease of native artery of native heart with stable angina pectoris (H24) 07/20/2021    based on symptoms 5/21 and positive est, then seen by cards and med [facility]    Elevated PSA 2016    Dr. Gonzalez, bx neg, seen by Dr Brown 2022, had mri 2021    Erectile dysfunction     Dr. Gonzalez    Gastritis     Hypercholesteremia     Hypothyroid 2007    Normal colonoscopy 2010    Vitamin D deficiency 2009    Vitamin D2/D3 of 19     Allergies: Dust mites   Medications:   Current Outpatient Medications   Medication Sig Dispense Refill    albuterol (PROAIR HFA/PROVENTIL HFA/VENTOLIN HFA) 108 (90 Base) MCG/ACT inhaler Inhale 2 puffs into the lungs every 6 hours as needed for shortness of breath, wheezing or cough 18 g 0    aspirin (ASA) 81 MG EC tablet Take 1 tablet (81 mg) by mouth daily      benzonatate (TESSALON) 100 MG capsule Take 1 capsule (100 mg) by mouth 3 times daily as needed for cough 30 capsule 0    benzonatate (TESSALON) 200 MG capsule Take 1 capsule (200 mg) by mouth 3 times daily as needed for cough 30 capsule 0    clopidogrel (PLAVIX) 75 MG tablet Take 1 tablet (75 mg) by mouth daily Take 4 pills first dose then once daily 93 tablet 4    famotidine (PEPCID) 20 MG tablet 1 TABLET 30 MINUTES BEFORE BEDTIME. ONCE A DAY ORALLY FOR 90 DAYS       finasteride (PROSCAR) 5 MG tablet TAKE 1 TABLET(5 MG) BY MOUTH DAILY 90 tablet 2    levothyroxine (SYNTHROID/LEVOTHROID) 88 MCG tablet Take 1 tablet (88 mcg) by mouth daily 90 tablet 3    melatonin 1 MG CAPS       metoprolol succinate ER (TOPROL XL) 25 MG 24 hr tablet Take 1 tablet (25 mg) by mouth daily 90 tablet 3    nitroGLYcerin (NITROSTAT) 0.4 MG sublingual tablet For chest pain place 1 tablet under the tongue every 5 minutes for 3 doses. If symptoms persist 5 minutes after 2nd dose call 911. 30 tablet 0    rosuvastatin (CRESTOR) 40 MG tablet Take 1 tablet (40 mg) by mouth daily 90 tablet 3    tamsulosin (FLOMAX) 0.4 MG capsule TAKE 1 CAPSULE(0.4 MG) BY MOUTH DAILY 30 MINUTES AFTER THE EVENING MEAL 90 capsule 2    Vitamin D (Cholecalciferol) 25 MCG (1000 UT) TABS Take 2,000 Units by mouth Nature made D 3       Social History:   Social History     Tobacco Use    Smoking status: Never    Smokeless tobacco: Never   Substance Use Topics    Alcohol use: Never       ROS:  Review of systems negative except as stated above.    OBJECTIVE:  /77   Pulse 65   Temp 98.6  F (37  C) (Tympanic)   Resp 18   Wt 95.3 kg (210 lb)   SpO2 97%   BMI 32.89 kg/m    GENERAL APPEARANCE: healthy, alert and no distress  EYES: EOMI,  PERRL, conjunctiva clear  HENT: ear canals and TM's normal.  Nose and mouth without ulcers, erythema or lesions  NECK: supple, nontender, no lymphadenopathy  RESP: lungs clear to auscultation - no rales, rhonchi or wheezes  CV: regular rates and rhythm, normal S1 S2, no murmur noted  SKIN: no suspicious lesions or rashes    Labs:    Results for orders placed or performed in visit on 10/04/23   Streptococcus A Rapid Screen w/Reflex to PCR - Clinic Collect     Status: Normal    Specimen: Throat; Swab   Result Value Ref Range    Group A Strep antigen Negative Negative       ASSESSMENT:    ICD-10-CM    1. Viral upper respiratory tract infection with cough  J06.9 Streptococcus A Rapid Screen w/Reflex  to PCR - Clinic Collect     Group A Streptococcus PCR Throat Swab     benzonatate (TESSALON) 100 MG capsule          PLAN:  Patient Instructions   Patient was educated on the natural course of viral illness which typically lasts up to 10 days. Had COVID last month so will not retest. Strep PCR is pending. Conservative measures discussed including increased fluids, nasal saline irrigation (neti pot), warm steamy shower, salt water gargles, Cepacol lozenges, expectorants (Mucinex), and analgesics (Tylenol). See your primary care provider if symptoms worsen or do not improve in 7 days. Seek emergency care if you develop fever over 104 or shortness of breath.    Patient verbalized understanding and is agreeable to plan. The patient was discharged ambulatory and in stable condition.    Ryann Mckeon PA-C ....................  10/4/2023   5:02 PM

## 2023-10-04 NOTE — PATIENT INSTRUCTIONS
Patient was educated on the natural course of viral illness which typically lasts up to 10 days. Had COVID last month so will not retest. Strep PCR is pending. Conservative measures discussed including increased fluids, nasal saline irrigation (neti pot), warm steamy shower, salt water gargles, Cepacol lozenges, expectorants (Mucinex), and analgesics (Tylenol). See your primary care provider if symptoms worsen or do not improve in 7 days. Seek emergency care if you develop fever over 104 or shortness of breath.

## 2023-10-07 ENCOUNTER — OFFICE VISIT (OUTPATIENT)
Dept: URGENT CARE | Facility: URGENT CARE | Age: 63
End: 2023-10-07
Payer: COMMERCIAL

## 2023-10-07 VITALS
OXYGEN SATURATION: 96 % | TEMPERATURE: 99.6 F | WEIGHT: 210 LBS | SYSTOLIC BLOOD PRESSURE: 116 MMHG | HEART RATE: 84 BPM | BODY MASS INDEX: 32.89 KG/M2 | DIASTOLIC BLOOD PRESSURE: 64 MMHG

## 2023-10-07 DIAGNOSIS — R06.2 WHEEZING: ICD-10-CM

## 2023-10-07 DIAGNOSIS — H10.33 ACUTE CONJUNCTIVITIS OF BOTH EYES, UNSPECIFIED ACUTE CONJUNCTIVITIS TYPE: ICD-10-CM

## 2023-10-07 DIAGNOSIS — J01.01 ACUTE RECURRENT MAXILLARY SINUSITIS: Primary | ICD-10-CM

## 2023-10-07 PROCEDURE — 99213 OFFICE O/P EST LOW 20 MIN: CPT | Performed by: FAMILY MEDICINE

## 2023-10-07 RX ORDER — ALBUTEROL SULFATE 90 UG/1
2 AEROSOL, METERED RESPIRATORY (INHALATION) EVERY 6 HOURS PRN
Qty: 18 G | Refills: 0 | Status: SHIPPED | OUTPATIENT
Start: 2023-10-07 | End: 2023-10-20

## 2023-10-07 RX ORDER — DOXYCYCLINE 100 MG/1
100 CAPSULE ORAL 2 TIMES DAILY
Qty: 20 CAPSULE | Refills: 0 | Status: SHIPPED | OUTPATIENT
Start: 2023-10-07 | End: 2023-10-17

## 2023-10-07 RX ORDER — TOBRAMYCIN AND DEXAMETHASONE 3; 1 MG/ML; MG/ML
1-2 SUSPENSION/ DROPS OPHTHALMIC 4 TIMES DAILY
Qty: 2.8 ML | Refills: 0 | Status: SHIPPED | OUTPATIENT
Start: 2023-10-07 | End: 2023-10-14

## 2023-10-07 NOTE — PROGRESS NOTES
Sarkis Darnell is a 63 year old male who comes in today for 3 -4 days of symptoms    Cough is severe    Vomiting     Mucus from eyes   Eyes sticky    Using refresh eye drops     [facility]    Getting worse    Patient feels feverish    Usual temp is low    Using inhaler, cough suppressant    Changed from tylenol to ibuprofen    White and yellow/ green phlegm    Mostly coming down from head    Had covid state fair time    Tested neg since then    Almost out of his inhaler      ROS    Physical Exam  Constitutional:       Appearance: Normal appearance.   HENT:      Head: Normocephalic and atraumatic.      Ears:      Comments: Right tympanic membrane appears to have some mild chronic changes.  No ear symptoms per patient   left tympanic membrane normal.  Canals normal.     Nose: Nose normal.      Mouth/Throat:      Mouth: Mucous membranes are moist.      Pharynx: Oropharynx is clear.      Comments: Some drainage posteriorly.   Some tenderness over sinuses.   Eyes:      Comments: Some redness of conjuctivae bilat. No colored drainage on exam but per patient he has had some.   Cardiovascular:      Rate and Rhythm: Normal rate and regular rhythm.      Heart sounds: Normal heart sounds.   Pulmonary:      Effort: Pulmonary effort is normal. No respiratory distress.      Breath sounds: Normal breath sounds.   Abdominal:      Palpations: Abdomen is soft.      Tenderness: There is no abdominal tenderness.   Neurological:      Mental Status: He is alert.     Radials  symmetric     No edema    Reviewed labs from recent [facility] visit    ASSESSMENT / PLAN:  (J01.01) Acute recurrent maxillary sinusitis  (primary encounter diagnosis)  Comment: patient is getting worse.  History of sinus problems.  Reasonable to cover with antibiotics. Patient agreed.   Plan: doxycycline hyclate (VIBRAMYCIN) 100 MG capsule             (H10.33) Acute conjunctivitis of both eyes, unspecified acute conjunctivitis type  Comment: eyes getting worse.  Use  combo eye drop.  Could also use reweetting drops.  Avoid visene type drops.   Plan: tobramycin-dexAMETHasone (TOBRADEX) 0.3-0.1 %         ophthalmic suspension             (R06.2) Wheezing  Comment: needs refill; uses prn   Plan: albuterol (PROAIR HFA/PROVENTIL HFA/VENTOLIN         HFA) 108 (90 Base) MCG/ACT inhaler             See AVS       I reviewed the patient's medications, allergies, medical history, family history, and social history.    Wil Thomas MD

## 2023-10-07 NOTE — PATIENT INSTRUCTIONS
Stay well hydrated    Take the antibiotics with food    Use eye drops     Follow up as needed based on symptoms     Be seen promptly if symptoms acutely worsen

## 2023-10-09 ENCOUNTER — OFFICE VISIT (OUTPATIENT)
Dept: FAMILY MEDICINE | Facility: CLINIC | Age: 63
End: 2023-10-09
Payer: COMMERCIAL

## 2023-10-09 VITALS
HEIGHT: 67 IN | TEMPERATURE: 97.7 F | OXYGEN SATURATION: 97 % | HEART RATE: 54 BPM | DIASTOLIC BLOOD PRESSURE: 81 MMHG | RESPIRATION RATE: 18 BRPM | BODY MASS INDEX: 32.65 KG/M2 | SYSTOLIC BLOOD PRESSURE: 135 MMHG | WEIGHT: 208 LBS

## 2023-10-09 DIAGNOSIS — I25.10 ASCVD (ARTERIOSCLEROTIC CARDIOVASCULAR DISEASE): ICD-10-CM

## 2023-10-09 DIAGNOSIS — R10.11 RUQ ABDOMINAL PAIN: Primary | ICD-10-CM

## 2023-10-09 DIAGNOSIS — J06.9 VIRAL URI WITH COUGH: ICD-10-CM

## 2023-10-09 PROCEDURE — 99214 OFFICE O/P EST MOD 30 MIN: CPT | Performed by: INTERNAL MEDICINE

## 2023-10-09 NOTE — PROGRESS NOTES
The patient presents for a few issues.    The patient has coronary artery disease as noted and reviewed.  He had a positive CTA and then ultimately underwent stenting.  He has regular cardiology follow-up.  He is not having chest pain or shortness of breath.    Since approximately October 2 he has had a cold and a cough.  He was seen in urgent care twice.  As noted he was given doxycycline a few days ago.  He is getting better.  He was also having eye discharges and is on eyedrops for that and that is also improved.  He has had some phlegm with his cough.  He does not have a fever.  No chest pain or shortness of breath but does note some wheezing.  No history of lung disease.  He uses albuterol now and thinks that helps.    He also has a pain in the right lower chest/upper abdomen since the cough.  He did not have it before.  Its when he coughs.    For about 8 to 9 months he also has discomfort in the right upper quadrant.  He gets it mostly when he sits for quite a while.  Food does not affect it.  No nausea or vomiting or bowel changes.  No unexplained weight loss.    Past Medical History:   Diagnosis Date    Adenomatous polyp of transverse colon 08/2021    fu 7 years    BPH (benign prostatic hyperplasia) 2016    Dr. Gonzalez    Burning sensation of throat 2021    egd nl,    Chest pain 01/2014    neg est echo    Coronary artery disease of native artery of native heart with stable angina pectoris (H24) 07/20/2021    based on symptoms 5/21 and positive est, then seen by erick and med [facility], then stenting done 6/23 with multivessel cad    Elevated PSA 2016    Dr. Gonzalez, bx neg, seen by Dr Brown 2022, had mri 2021    Erectile dysfunction     Dr. Gonzalez    Gastritis     Hypercholesteremia     Hypothyroid 2007    Normal colonoscopy 2010    Vitamin D deficiency 2009    Vitamin D2/D3 of 19     Past Surgical History:   Procedure Laterality Date    ABDOMEN SURGERY  01/08/2021    Incisional Hernia    BIOPSY  09/2015     Prostate. Later PSA results were found good.    COLONOSCOPY  2010    No issue found.    CV CORONARY ANGIOGRAM N/A 6/23/2023    Procedure: Coronary Angiogram;  Surgeon: Chuck Priest MD;  Location:  HEART CARDIAC CATH LAB    CV LEFT HEART CATH N/A 6/23/2023    Procedure: Left Heart Catheterization;  Surgeon: Chuck Priest MD;  Location:  HEART CARDIAC CATH LAB    CV PCI N/A 6/23/2023    Procedure: Percutaneous Coronary Intervention;  Surgeon: Chuck Priest MD;  Location:  HEART CARDIAC CATH LAB    HERNIA REPAIR      Incisional Hernia    LAPAROSCOPIC CHOLECYSTECTOMY N/A 11/16/2015    Procedure: LAPAROSCOPIC CHOLECYSTECTOMY;  Surgeon: Sivakumar Burr MD;  Location:  [facility]    LAPAROSCOPIC HERNIORRHAPHY VENTRAL N/A 01/08/2021    Procedure: LAPAROSCOPIC VENTRAL HERNIA REPAIR WITH MESH;  Surgeon: Sivakumar Burr MD;  Location:  OR    PROSTATE SURGERY      Trus BX     Social History     Socioeconomic History    Marital status:      Spouse name: Not on file    Number of children: 2    Years of education: Not on file    Highest education level: Not on file   Occupational History    Occupation: quality engineer     Employer: [email or web address]     Employer: [employer]   Tobacco Use    Smoking status: Never    Smokeless tobacco: Never   Vaping Use    Vaping Use: Never used   Substance and Sexual Activity    Alcohol use: Never    Drug use: Never    Sexual activity: Yes     Partners: Female     Birth control/protection: Abstinence     Comment: New medicines have affected my general routine of life   Other Topics Concern    Parent/sibling w/ CABG, MI or angioplasty before 65F 55M? No   Social History Narrative    Not on file     Social Determinants of Health     Financial Resource Strain: Low Risk  (10/9/2023)    Financial Resource Strain     Within the past 12 months, have you or your family members you live with been unable to get utilities (heat, electricity) when it was really  needed?: No   Food Insecurity: Low Risk  (10/9/2023)    Food Insecurity     Within the past 12 months, did you worry that your food would run out before you got money to buy more?: No     Within the past 12 months, did the food you bought just not last and you didn t have money to get more?: No   Transportation Needs: Low Risk  (10/9/2023)    Transportation Needs     Within the past 12 months, has lack of transportation kept you from medical appointments, getting your medicines, non-medical meetings or appointments, work, or from getting things that you need?: No   Physical Activity: Not on file   Stress: Not on file   Social Connections: Not on file   Interpersonal Safety: Not on file   Housing Stability: Low Risk  (10/9/2023)    Housing Stability     Do you have housing? : Yes     Are you worried about losing your housing?: No     Current Outpatient Medications   Medication Sig Dispense Refill    albuterol (PROAIR HFA/PROVENTIL HFA/VENTOLIN HFA) 108 (90 Base) MCG/ACT inhaler Inhale 2 puffs into the lungs every 6 hours as needed for shortness of breath, wheezing or cough 18 g 0    aspirin (ASA) 81 MG EC tablet Take 1 tablet (81 mg) by mouth daily      benzonatate (TESSALON) 100 MG capsule Take 1 capsule (100 mg) by mouth 3 times daily as needed for cough 30 capsule 0    clopidogrel (PLAVIX) 75 MG tablet Take 1 tablet (75 mg) by mouth daily Take 4 pills first dose then once daily 93 tablet 4    doxycycline hyclate (VIBRAMYCIN) 100 MG capsule Take 1 capsule (100 mg) by mouth 2 times daily for 10 days 20 capsule 0    famotidine (PEPCID) 20 MG tablet 1 TABLET 30 MINUTES BEFORE BEDTIME. ONCE A DAY ORALLY FOR 90 DAYS      finasteride (PROSCAR) 5 MG tablet TAKE 1 TABLET(5 MG) BY MOUTH DAILY 90 tablet 2    levothyroxine (SYNTHROID/LEVOTHROID) 88 MCG tablet Take 1 tablet (88 mcg) by mouth daily 90 tablet 3    melatonin 1 MG CAPS       metoprolol succinate ER (TOPROL XL) 25 MG 24 hr tablet Take 1 tablet (25 mg) by mouth  "daily 90 tablet 3    rosuvastatin (CRESTOR) 40 MG tablet Take 1 tablet (40 mg) by mouth daily 90 tablet 3    tamsulosin (FLOMAX) 0.4 MG capsule TAKE 1 CAPSULE(0.4 MG) BY MOUTH DAILY 30 MINUTES AFTER THE EVENING MEAL 90 capsule 2    tobramycin-dexAMETHasone (TOBRADEX) 0.3-0.1 % ophthalmic suspension Place 1-2 drops into both eyes 4 times daily for 7 days 2.8 mL 0    Vitamin D (Cholecalciferol) 25 MCG (1000 UT) TABS Take 2,000 Units by mouth Nature made D 3      nitroGLYcerin (NITROSTAT) 0.4 MG sublingual tablet For chest pain place 1 tablet under the tongue every 5 minutes for 3 doses. If symptoms persist 5 minutes after 2nd dose call 911. 30 tablet 0     Allergies   Allergen Reactions    Dust Mites      Sneezing, water eyes     FAMILY HISTORY NOTED AND REVIEWED    REVIEW OF SYSTEMS: above    PHYSICAL EXAM    /81 (BP Location: Right arm, Patient Position: Sitting, Cuff Size: Adult Large)   Pulse 54   Temp 97.7  F (36.5  C)   Resp 18   Ht 1.702 m (5' 7\")   Wt 94.3 kg (208 lb)   SpO2 97%   BMI 32.58 kg/m      Patient appears non toxic  Tympanic membranes and canals: within normal limits bilaterally.   Mouth: Posterior pharynx, mucous membranes and tongue exam within normal limits.  Neck: supple, no nuchal rigidity or masses.  No anterior or posterior cervical adenopathy.    Lungs: clear, normal flow and effort.  Cv reglar rate and rhythm, no jvp  Abdomen normal active bowel sounds, soft non-tender, no murmer, rub or gallop, no hepatosplenomegaly    Ct to be done    ASSESSMENT:  Ascvd, doing well, med mgmt, follow up cards  Cough, suspect viral uri, doubt sinusitis, pneumonia, etc  Wheezing, none today, suspect viral bronchitis, not cv  Recent lower chest pain, doubt cv, most c/w msk from cough  Ruq pain, neg exam, doubt gallstones, suspect msk    PLAN:  For cough no changes, call if worsens, not resolving soon  Ct abdomen and pelvis for ruq pain  Follow up complete physical exam    Alejandro Lewis, " M.D.

## 2023-10-14 ENCOUNTER — HOSPITAL ENCOUNTER (OUTPATIENT)
Dept: CT IMAGING | Facility: CLINIC | Age: 63
Discharge: HOME | End: 2023-10-14
Attending: INTERNAL MEDICINE | Admitting: INTERNAL MEDICINE
Payer: COMMERCIAL

## 2023-10-14 DIAGNOSIS — R10.11 RUQ ABDOMINAL PAIN: ICD-10-CM

## 2023-10-14 PROCEDURE — 74177 CT ABD & PELVIS W/CONTRAST: CPT

## 2023-10-14 PROCEDURE — 250N000011 HC RX IP 250 OP 636: Performed by: INTERNAL MEDICINE

## 2023-10-14 PROCEDURE — 250N000009 HC RX 250: Performed by: INTERNAL MEDICINE

## 2023-10-14 RX ORDER — IOPAMIDOL 755 MG/ML
100 INJECTION, SOLUTION INTRAVASCULAR ONCE
Status: COMPLETED | OUTPATIENT
Start: 2023-10-14 | End: 2023-10-14

## 2023-10-14 RX ADMIN — SODIUM CHLORIDE 69 ML: 9 INJECTION, SOLUTION INTRAVENOUS at 07:32

## 2023-10-14 RX ADMIN — IOPAMIDOL 100 ML: 755 INJECTION, SOLUTION INTRAVENOUS at 07:32

## 2023-10-15 NOTE — RESULT ENCOUNTER NOTE
I am please to report that your ct scan looks very good.  No tumors or masses or signs of problems.  Let me know if you have questions.    Alejandro Lewis M.D.

## 2023-10-19 ENCOUNTER — HOSPITAL ENCOUNTER (OUTPATIENT)
Dept: CARDIOLOGY | Facility: CLINIC | Age: 63
Discharge: HOME | End: 2023-10-19
Attending: INTERNAL MEDICINE | Admitting: INTERNAL MEDICINE
Payer: COMMERCIAL

## 2023-10-19 DIAGNOSIS — I25.118 CORONARY ARTERY DISEASE OF NATIVE ARTERY OF NATIVE HEART WITH STABLE ANGINA PECTORIS (H): ICD-10-CM

## 2023-10-19 PROCEDURE — 93018 CV STRESS TEST I&R ONLY: CPT | Performed by: INTERNAL MEDICINE

## 2023-10-19 PROCEDURE — 93350 STRESS TTE ONLY: CPT | Mod: 26 | Performed by: INTERNAL MEDICINE

## 2023-10-19 PROCEDURE — 93016 CV STRESS TEST SUPVJ ONLY: CPT | Performed by: INTERNAL MEDICINE

## 2023-10-19 PROCEDURE — 93325 DOPPLER ECHO COLOR FLOW MAPG: CPT | Mod: 26 | Performed by: INTERNAL MEDICINE

## 2023-10-19 PROCEDURE — 93321 DOPPLER ECHO F-UP/LMTD STD: CPT | Mod: 26 | Performed by: INTERNAL MEDICINE

## 2023-10-19 PROCEDURE — 255N000002 HC RX 255 OP 636: Performed by: INTERNAL MEDICINE

## 2023-10-19 PROCEDURE — 93017 CV STRESS TEST TRACING ONLY: CPT

## 2023-10-19 RX ADMIN — HUMAN ALBUMIN MICROSPHERES AND PERFLUTREN 9 ML: 10; .22 INJECTION, SOLUTION INTRAVENOUS at 10:28

## 2023-10-20 ENCOUNTER — OFFICE VISIT (OUTPATIENT)
Dept: CARDIOLOGY | Facility: CLINIC | Age: 63
End: 2023-10-20
Attending: INTERNAL MEDICINE
Payer: COMMERCIAL

## 2023-10-20 VITALS
BODY MASS INDEX: 32.85 KG/M2 | OXYGEN SATURATION: 99 % | DIASTOLIC BLOOD PRESSURE: 71 MMHG | HEIGHT: 67 IN | SYSTOLIC BLOOD PRESSURE: 105 MMHG | HEART RATE: 62 BPM | WEIGHT: 209.3 LBS

## 2023-10-20 DIAGNOSIS — I25.118 CORONARY ARTERY DISEASE OF NATIVE ARTERY OF NATIVE HEART WITH STABLE ANGINA PECTORIS (H): Primary | ICD-10-CM

## 2023-10-20 DIAGNOSIS — R06.02 SHORTNESS OF BREATH: ICD-10-CM

## 2023-10-20 DIAGNOSIS — R60.0 PERIPHERAL EDEMA: ICD-10-CM

## 2023-10-20 DIAGNOSIS — E78.2 MIXED HYPERCHOLESTEROLEMIA AND HYPERTRIGLYCERIDEMIA: ICD-10-CM

## 2023-10-20 DIAGNOSIS — I87.2 VENOUS (PERIPHERAL) INSUFFICIENCY: ICD-10-CM

## 2023-10-20 DIAGNOSIS — E66.09 CLASS 1 OBESITY DUE TO EXCESS CALORIES WITH SERIOUS COMORBIDITY AND BODY MASS INDEX (BMI) OF 32.0 TO 32.9 IN ADULT: ICD-10-CM

## 2023-10-20 DIAGNOSIS — R73.01 IMPAIRED FASTING GLUCOSE: ICD-10-CM

## 2023-10-20 DIAGNOSIS — E66.811 CLASS 1 OBESITY DUE TO EXCESS CALORIES WITH SERIOUS COMORBIDITY AND BODY MASS INDEX (BMI) OF 32.0 TO 32.9 IN ADULT: ICD-10-CM

## 2023-10-20 PROCEDURE — 99215 OFFICE O/P EST HI 40 MIN: CPT | Performed by: INTERNAL MEDICINE

## 2023-10-20 NOTE — PROGRESS NOTES
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  I stented his sister as well.     Renny has a past medical history significant for mixed hyperlipidemia with elevated triglycerides and low HDL, impaired fasting glucose, hypothyroidism, GERD, erectile dysfunction, benign prostatic hypertrophy with elevated PSA, central obesity all consistent with metabolic syndrome, venous varicosities and periodic peripheral edema.  He is a lifelong nonsmoker.     He had chronic stable exertional angina for which I been following him for a couple of years.  He always wanted a conservative course.  2023  we did a CT angiogram demonstrating fairly heavily calcified vessels and potentially three-vessel coronary disease.  With this I was able to convince him to go to the Cath Lab.     There he was found to have three-vessel coronary disease.  Dr. Priest called me I was unable to look at the films as I was off but in discussion with the patient they decided to proceed with multivessel intervention.  I did review the angiogram upon my return and again today.  Circumflex and left anterior descending artery was successfully stented.  Appears to be dual PDA's both with ostial lesions.  I agree initial treatment with medical management is appropriate.     Darrell had Brilinta shortness of breath which resolved with switching to clopidogrel     He is not having any chest arm neck jaw or shoulder discomfort.  No lightheadedness dizziness syncope or near syncope.     We did a follow-up stress echo yesterday for which she is able to go 7-1/2 minutes without symptoms, EKG changes or echocardiographic evidence of ischemia.     ASSESSMENT AND PLAN: Patient has no symptoms to suggest ischemia, heart failure or significant arrhythmia.  We reviewed his stress test compared it with his previous stress test which was symptomatic with significant EKG changes.  Previous echocardiogram  appeared to be normal from an echo standpoint.     His cholesterol is well treated with an LDL of 43.     To look for other risk factors I checked LP(a) and C-reactive protein both of which were normal.  Glucose was mildly elevated at 102 consistent with impaired fasting glucose.     Body mass index is now 33.  We talked about the importance of regular exercise including both aerobic activity and resistance activity.  He talks about when he lived in [place] he would walk 3 miles a day and clearly ate a much healthier diet more vegetable and mercado based with little bits of meat.  He relates his son and daughter have significant weight problems and are working on their risk factors.     Blood pressure is well controlled today we will continue his current medical regimen as is.        I will plan on seeing him back in June as he will be 1 year out from his intervention.  At that time I will stop his aspirin.  He asks about that thinking he was going to be stopping Plavix and is worried about continuing on Plavix.  We discussed the host exam trial and the [facility] meta-analysis.  He is worried that the Plavix will interfere with his immune system.  Thank you for allowing me to participate in his care.     Jude Pérez MD, [facility]    Today's clinic visit entailed:  Review of the result(s) of each unique test - lab work, stress echocardiogram  Ordering of each unique test  Prescription drug management  45 minutes spent by me on the date of the encounter doing chart review, history and exam, documentation and further activities per the note  Provider  Link to [facility] Help Grid           Orders Placed This Encounter   Procedures    Lipid Profile    ALT    Basic metabolic panel    Follow-Up with Cardiology       No orders of the defined types were placed in this encounter.      There are no discontinued medications.      Encounter Diagnoses   Name Primary?    Coronary artery disease of native artery of native heart with  stable angina pectoris (H24) Yes    Venous (peripheral) insufficiency     Peripheral edema     Mixed hypercholesterolemia and hypertriglyceridemia     Class 1 obesity due to excess calories with serious comorbidity and body mass index (BMI) of 32.0 to 32.9 in adult     Impaired fasting glucose        CURRENT MEDICATIONS:  Current Outpatient Medications   Medication Sig Dispense Refill    aspirin (ASA) 81 MG EC tablet Take 1 tablet (81 mg) by mouth daily      clopidogrel (PLAVIX) 75 MG tablet Take 1 tablet (75 mg) by mouth daily Take 4 pills first dose then once daily 93 tablet 4    famotidine (PEPCID) 20 MG tablet 1 TABLET 30 MINUTES BEFORE BEDTIME. ONCE A DAY ORALLY FOR 90 DAYS      finasteride (PROSCAR) 5 MG tablet TAKE 1 TABLET(5 MG) BY MOUTH DAILY 90 tablet 2    levothyroxine (SYNTHROID/LEVOTHROID) 88 MCG tablet Take 1 tablet (88 mcg) by mouth daily 90 tablet 3    melatonin 1 MG CAPS       metoprolol succinate ER (TOPROL XL) 25 MG 24 hr tablet Take 1 tablet (25 mg) by mouth daily 90 tablet 3    nitroGLYcerin (NITROSTAT) 0.4 MG sublingual tablet For chest pain place 1 tablet under the tongue every 5 minutes for 3 doses. If symptoms persist 5 minutes after 2nd dose call 911. 30 tablet 0    rosuvastatin (CRESTOR) 40 MG tablet Take 1 tablet (40 mg) by mouth daily 90 tablet 3    tamsulosin (FLOMAX) 0.4 MG capsule TAKE 1 CAPSULE(0.4 MG) BY MOUTH DAILY 30 MINUTES AFTER THE EVENING MEAL 90 capsule 2    Vitamin D (Cholecalciferol) 25 MCG (1000 UT) TABS Take 2,000 Units by mouth Nature made D 3      albuterol (PROAIR HFA/PROVENTIL HFA/VENTOLIN HFA) 108 (90 Base) MCG/ACT inhaler Inhale 2 puffs into the lungs every 6 hours as needed for shortness of breath, wheezing or cough 18 g 0       ALLERGIES     Allergies   Allergen Reactions    Dust Mites      Sneezing, water eyes       PAST MEDICAL HISTORY:  Past Medical History:   Diagnosis Date    Adenomatous polyp of transverse colon 08/2021    fu 7 years    BPH (benign prostatic  hyperplasia) 2016    Dr. Gonzalez    Burning sensation of throat 2021    egd nl,    Chest pain 01/2014    neg est echo    Coronary artery disease of native artery of native heart with stable angina pectoris (H24) 07/20/2021    based on symptoms 5/21 and positive est, then seen by erick and med mgmt, then stenting done 6/23 with multivessel cad    Elevated PSA 2016    Dr. Gonzalez, bx neg, seen by Dr Brown 2022, had mri 2021    Erectile dysfunction     Dr. Gonzalez    Gastritis     Hypercholesteremia     Hypothyroid 2007    Normal colonoscopy 2010    Vitamin D deficiency 2009    Vitamin D2/D3 of 19       PAST SURGICAL HISTORY:  Past Surgical History:   Procedure Laterality Date    ABDOMEN SURGERY  01/08/2021    Incisional Hernia    BIOPSY  09/2015    Prostate. Later PSA results were found good.    COLONOSCOPY  2010    No issue found.    CV CORONARY ANGIOGRAM N/A 6/23/2023    Procedure: Coronary Angiogram;  Surgeon: Chuck Priest MD;  Location:  HEART CARDIAC CATH LAB    CV LEFT HEART CATH N/A 6/23/2023    Procedure: Left Heart Catheterization;  Surgeon: Chuck Priest MD;  Location:  HEART [facility]ARD[facility]ATH LAB    CV PCI N/A 6/23/2023    Procedure: Percutaneous Coronary Intervention;  Surgeon: Chuck Priest MD;  Location:  HEART CARDIAC CATH LAB    HERNIA REPAIR      Incisional Hernia    LAPAROSCOPIC CHOLECYSTECTOMY N/A 11/16/2015    Procedure: LAPAROSCOPIC CHOLECYSTECTOMY;  Surgeon: Sivakumar Burr MD;  Location: [facility]    LAPAROSCOPIC HERNIORRHAPHY VENTRAL N/A 01/08/2021    Procedure: LAPAROSCOPIC VENTRAL HERNIA REPAIR WITH MESH;  Surgeon: Sivakumar Burr MD;  Location:  OR    PROSTATE SURGERY      Trus BX       FAMILY HISTORY:  Family History   Problem Relation Age of Onset    C.A.D. Mother     Arthritis Mother     Cancer Mother         ovarian    Coronary Artery Disease Mother         Angina, and Congestive heart failure    Other Cancer Mother         Ovarian    Osteoporosis  Mother         After 70 years of age    Thyroid Disease Mother         Found low when 70+    Coronary Artery Disease Father         Angina    Hyperlipidemia Father     Hyperlipidemia Brother     Hypertension Brother     No Known Problems Sister     Cancer Maternal Grandfather         esophageal cancer    Other Cancer Maternal Grandfather         Esophagus    Cancer Paternal Grandmother         liver cancer    Other Cancer Paternal Grandmother         Liver    Cancer Paternal Grandfather         throat cancer    Other Cancer Paternal Grandfather         Throat    Hypertension Brother     Thyroid Disease Brother         Found low when 40+       SOCIAL HISTORY:  Social History     Socioeconomic History    Marital status:      Spouse name: None    Number of children: 2    Years of education: None    Highest education level: None   Occupational History    Occupation: quality engineer     Employer: [email or web address]     Employer: [employer]   Tobacco Use    Smoking status: Never    Smokeless tobacco: Never   Vaping Use    Vaping Use: Never used   Substance and Sexual Activity    Alcohol use: Never    Drug use: Never    Sexual activity: Yes     Partners: Female     Birth control/protection: Abstinence     Comment: New medicines have affected my general routine of life   Other Topics Concern    Parent/sibling w/ CABG, MI or angioplasty before 65F 55M? No     Social Determinants of Health     Financial Resource Strain: Low Risk  (10/9/2023)    Financial Resource Strain     Within the past 12 months, have you or your family members you live with been unable to get utilities (heat, electricity) when it was really needed?: No   Food Insecurity: Low Risk  (10/9/2023)    Food Insecurity     Within the past 12 months, did you worry that your food would run out before you got money to buy more?: No     Within the past 12 months, did the food you bought just not last and you didn t have money to get more?: No   Transportation Needs:  "Low Risk  (10/9/2023)    Transportation Needs     Within the past 12 months, has lack of transportation kept you from medical appointments, getting your medicines, non-medical meetings or appointments, work, or from getting things that you need?: No   Housing Stability: Low Risk  (10/9/2023)    Housing Stability     Do you have housing? : Yes     Are you worried about losing your housing?: No       Review of Systems:  Skin:  not assessed   left middle finger pain upon touching the skin   Eyes:  Positive for glasses    ENT:  Positive for hearing loss right ear  Respiratory:  Positive for dyspnea on exertion SOB -- pt states it is 80% better.   Cardiovascular:  palpitations;Negative;lightheadedness;dizziness;fatigue;chest pain;syncope or near-syncope;exercise intolerance Positive for;edema ooc, edema in the legs is about the same since LOV. energy level is getting better -- has been sick a few times though.  Gastroenterology: Positive for reflux;heartburn takes Rx for  Genitourinary:  Negative      Musculoskeletal:  not assessed back pain chronic low back pain  Neurologic:  Negative      Psychiatric:  Negative      Heme/Lymph/Imm:  Positive for allergies seasonal  Endocrine:  Positive for thyroid disorder      Physical Exam:  Vitals: /71 (BP Location: Left arm, Patient Position: Sitting)   Pulse 62   Ht 1.702 m (5' 7\")   Wt 94.9 kg (209 lb 4.8 oz)   SpO2 99%   BMI 32.78 kg/m      Constitutional:  cooperative, alert and oriented, well developed, well nourished, in no acute distress obese      Skin:  warm and dry to the touch, no apparent skin lesions or masses noted          Head:  normocephalic, no masses or lesions        Eyes:  pupils equal and round, conjunctivae and lids unremarkable, sclera white, no xanthalasma, EOMS intact, no nystagmus        Lymph:      ENT:  no pallor or cyanosis, dentition good        Neck:  carotid pulses are full and equal bilaterally;no carotid bruit        Respiratory:  " normal breath sounds, clear to auscultation, normal A-P diameter, normal symmetry, normal respiratory excursion, no use of accessory muscles         Cardiac: regular rhythm;no murmurs, gallops or rubs detected                pulses full and equal                                        GI:    obese      Extremities and Muscular Skeletal:  no edema;no spinal abnormalities noted;normal muscle strength and tone   varicose vein RLE edema;trace   Right groin small hematoma with ecchymoses no bruit.    Neurological:  no gross motor deficits        Psych:  affect appropriate, oriented to time, person and place Anxious      CC  Jude Pérez MD  [address]  [place]  [place] [id number]

## 2023-10-20 NOTE — LETTER
10/20/2023    Alejandro Lewis MD  [address]  [place] [address]    RE: Sarkis OLIVIA Mann       Dear Colleague,     I had the pleasure of seeing Sarkis Darnell in the [facility].  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  I stented his sister as well.     Renny has a past medical history significant for mixed hyperlipidemia with elevated triglycerides and low HDL, impaired fasting glucose, hypothyroidism, GERD, erectile dysfunction, benign prostatic hypertrophy with elevated PSA, central obesity all consistent with metabolic syndrome, venous varicosities and periodic peripheral edema.  He is a lifelong nonsmoker.     He had chronic stable exertional angina for which I been following him for a couple of years.  He always wanted a conservative course.  2023  we did a CT angiogram demonstrating fairly heavily calcified vessels and potentially three-vessel coronary disease.  With this I was able to convince him to go to the Cath Lab.     There he was found to have three-vessel coronary disease.  Dr. Priest called me I was unable to look at the films as I was off but in discussion with the patient they decided to proceed with multivessel intervention.  I did review the angiogram upon my return and again today.  Circumflex and left anterior descending artery was successfully stented.  Appears to be dual PDA's both with ostial lesions.  I agree initial treatment with medical management is appropriate.     Darrell had Brilinta shortness of breath which resolved with switching to clopidogrel     He is not having any chest arm neck jaw or shoulder discomfort.  No lightheadedness dizziness syncope or near syncope.     We did a follow-up stress echo yesterday for which she is able to go 7-1/2 minutes without symptoms, EKG changes or echocardiographic evidence of ischemia.     ASSESSMENT AND PLAN: Patient has no symptoms to suggest  ischemia, heart failure or significant arrhythmia.  We reviewed his stress test compared it with his previous stress test which was symptomatic with significant EKG changes.  Previous echocardiogram appeared to be normal from an echo standpoint.     His cholesterol is well treated with an LDL of 43.     To look for other risk factors I checked LP(a) and C-reactive protein both of which were normal.  Glucose was mildly elevated at 102 consistent with impaired fasting glucose.     Body mass index is now 33.  We talked about the importance of regular exercise including both aerobic activity and resistance activity.  He talks about when he lived in [place] he would walk 3 miles a day and clearly ate a much healthier diet more vegetable and mercado based with little bits of meat.  He relates his son and daughter have significant weight problems and are working on their risk factors.     Blood pressure is well controlled today we will continue his current medical regimen as is.        I will plan on seeing him back in June as he will be 1 year out from his intervention.  At that time I will stop his aspirin.  He asks about that thinking he was going to be stopping Plavix and is worried about continuing on Plavix.  We discussed the host exam trial and the [place] meta-analysis.  He is worried that the Plavix will interfere with his immune system.  Thank you for allowing me to participate in his care.     Jude Pérez MD, [facility]    Today's clinic visit entailed:  Review of the result(s) of each unique test - lab work, stress echocardiogram  Ordering of each unique test  Prescription drug management  45 minutes spent by me on the date of the encounter doing chart review, history and exam, documentation and further activities per the note  Provider  Link to [facility] Help Grid           Orders Placed This Encounter   Procedures    Lipid Profile    ALT    Basic metabolic panel    Follow-Up with Cardiology       No orders of  the defined types were placed in this encounter.      There are no discontinued medications.      Encounter Diagnoses   Name Primary?    Coronary artery disease of native artery of native heart with stable angina pectoris (H24) Yes    Venous (peripheral) insufficiency     Peripheral edema     Mixed hypercholesterolemia and hypertriglyceridemia     Class 1 obesity due to excess calories with serious comorbidity and body mass index (BMI) of 32.0 to 32.9 in adult     Impaired fasting glucose        CURRENT MEDICATIONS:  Current Outpatient Medications   Medication Sig Dispense Refill    aspirin (ASA) 81 MG EC tablet Take 1 tablet (81 mg) by mouth daily      clopidogrel (PLAVIX) 75 MG tablet Take 1 tablet (75 mg) by mouth daily Take 4 pills first dose then once daily 93 tablet 4    famotidine (PEPCID) 20 MG tablet 1 TABLET 30 MINUTES BEFORE BEDTIME. ONCE A DAY ORALLY FOR 90 DAYS      finasteride (PROSCAR) 5 MG tablet TAKE 1 TABLET(5 MG) BY MOUTH DAILY 90 tablet 2    levothyroxine (SYNTHROID/LEVOTHROID) 88 MCG tablet Take 1 tablet (88 mcg) by mouth daily 90 tablet 3    melatonin 1 MG CAPS       metoprolol succinate ER (TOPROL XL) 25 MG 24 hr tablet Take 1 tablet (25 mg) by mouth daily 90 tablet 3    nitroGLYcerin (NITROSTAT) 0.4 MG sublingual tablet For chest pain place 1 tablet under the tongue every 5 minutes for 3 doses. If symptoms persist 5 minutes after 2nd dose call 911. 30 tablet 0    rosuvastatin (CRESTOR) 40 MG tablet Take 1 tablet (40 mg) by mouth daily 90 tablet 3    tamsulosin (FLOMAX) 0.4 MG capsule TAKE 1 CAPSULE(0.4 MG) BY MOUTH DAILY 30 MINUTES AFTER THE EVENING MEAL 90 capsule 2    Vitamin D (Cholecalciferol) 25 MCG (1000 UT) TABS Take 2,000 Units by mouth Nature made D 3      albuterol (PROAIR HFA/PROVENTIL HFA/VENTOLIN HFA) 108 (90 Base) MCG/ACT inhaler Inhale 2 puffs into the lungs every 6 hours as needed for shortness of breath, wheezing or cough 18 g 0       ALLERGIES     Allergies   Allergen  Reactions    Dust Mites      Sneezing, water eyes       PAST MEDICAL HISTORY:  Past Medical History:   Diagnosis Date    Adenomatous polyp of transverse colon 08/2021    fu 7 years    BPH (benign prostatic hyperplasia) 2016    Dr. Gonzalez    Burning sensation of throat 2021    egd nl,    Chest pain 01/2014    neg est echo    Coronary artery disease of native artery of native heart with stable angina pectoris (H24) 07/20/2021    based on symptoms 5/21 and positive est, then seen by cards and med maria m, then stenting done 6/23 with multivessel cad    Elevated PSA 2016    Dr. Gonzalez, bx neg, seen by Dr Brown 2022, had mri 2021    Erectile dysfunction     Dr. Gonzalez    Gastritis     Hypercholesteremia     Hypothyroid 2007    Normal colonoscopy 2010    Vitamin D deficiency 2009    Vitamin D2/D3 of 19       PAST SURGICAL HISTORY:  Past Surgical History:   Procedure Laterality Date    ABDOMEN SURGERY  01/08/2021    Incisional Hernia    BIOPSY  09/2015    Prostate. Later PSA results were found good.    COLONOSCOPY  2010    No issue found.    CV CORONARY ANGIOGRAM N/A 6/23/2023    Procedure: Coronary Angiogram;  Surgeon: Chuck Priest MD;  Location:  HEART CARDIAC CATH LAB    CV LEFT HEART CATH N/A 6/23/2023    Procedure: Left Heart Catheterization;  Surgeon: Chuck Priest MD;  Location:  HEART CARDIAC CATH LAB    CV PCI N/A 6/23/2023    Procedure: Percutaneous Coronary Intervention;  Surgeon: Chuck Priest MD;  Location:  HEART CARDIAC CATH LAB    HERNIA REPAIR      Incisional Hernia    LAPAROSCOPIC CHOLECYSTECTOMY N/A 11/16/2015    Procedure: LAPAROSCOPIC CHOLECYSTECTOMY;  Surgeon: Sivakumar Burr MD;  Location: [facility]    LAPAROSCOPIC HERNIORRHAPHY VENTRAL N/A 01/08/2021    Procedure: LAPAROSCOPIC VENTRAL HERNIA REPAIR WITH MESH;  Surgeon: Sivakumar Burr MD;  Location:  OR    PROSTATE SURGERY      Trus BX       FAMILY HISTORY:  Family History   Problem Relation Age of Onset     AMIE.ABERNARD Mother     Arthritis Mother     Cancer Mother         ovarian    Coronary Artery Disease Mother         Angina, and Congestive heart failure    Other Cancer Mother         Ovarian    Osteoporosis Mother         After 70 years of age    Thyroid Disease Mother         Found low when 70+    Coronary Artery Disease Father         Angina    Hyperlipidemia Father     Hyperlipidemia Brother     Hypertension Brother     No Known Problems Sister     Cancer Maternal Grandfather         esophageal cancer    Other Cancer Maternal Grandfather         Esophagus    Cancer Paternal Grandmother         liver cancer    Other Cancer Paternal Grandmother         Liver    Cancer Paternal Grandfather         throat cancer    Other Cancer Paternal Grandfather         Throat    Hypertension Brother     Thyroid Disease Brother         Found low when 40+       SOCIAL HISTORY:  Social History     Socioeconomic History    Marital status:      Spouse name: None    Number of children: 2    Years of education: None    Highest education level: None   Occupational History    Occupation: quality engineer     Employer: [employer]     Employer: [employer]   Tobacco Use    Smoking status: Never    Smokeless tobacco: Never   Vaping Use    Vaping Use: Never used   Substance and Sexual Activity    Alcohol use: Never    Drug use: Never    Sexual activity: Yes     Partners: Female     Birth control/protection: Abstinence     Comment: New medicines have affected my general routine of life   Other Topics Concern    Parent/sibling w/ CABG, MI or angioplasty before 65F 55M? No     Social Determinants of Health     Financial Resource Strain: Low Risk  (10/9/2023)    Financial Resource Strain     Within the past 12 months, have you or your family members you live with been unable to get utilities (heat, electricity) when it was really needed?: No   Food Insecurity: Low Risk  (10/9/2023)    Food Insecurity     Within the past 12 months, did you worry  "that your food would run out before you got money to buy more?: No     Within the past 12 months, did the food you bought just not last and you didn t have money to get more?: No   Transportation Needs: Low Risk  (10/9/2023)    Transportation Needs     Within the past 12 months, has lack of transportation kept you from medical appointments, getting your medicines, non-medical meetings or appointments, work, or from getting things that you need?: No   Housing Stability: Low Risk  (10/9/2023)    Housing Stability     Do you have housing? : Yes     Are you worried about losing your housing?: No       Review of Systems:  Skin:  not assessed   left middle finger pain upon touching the skin   Eyes:  Positive for glasses    ENT:  Positive for hearing loss right ear  Respiratory:  Positive for dyspnea on exertion SOB -- pt states it is 80% better.   Cardiovascular:  palpitations;Negative;lightheadedness;dizziness;fatigue;chest pain;syncope or near-syncope;exercise intolerance Positive for;edema ooc, edema in the legs is about the same since LOV. energy level is getting better -- has been sick a few times though.  Gastroenterology: Positive for reflux;heartburn takes Rx for  Genitourinary:  Negative      Musculoskeletal:  not assessed back pain chronic low back pain  Neurologic:  Negative      Psychiatric:  Negative      Heme/Lymph/Imm:  Positive for allergies seasonal  Endocrine:  Positive for thyroid disorder      Physical Exam:  Vitals: /71 (BP Location: Left arm, Patient Position: Sitting)   Pulse 62   Ht 1.702 m (5' 7\")   Wt 94.9 kg (209 lb 4.8 oz)   SpO2 99%   BMI 32.78 kg/m      Constitutional:  cooperative, alert and oriented, well developed, well nourished, in no acute distress obese      Skin:  warm and dry to the touch, no apparent skin lesions or masses noted          Head:  normocephalic, no masses or lesions        Eyes:  pupils equal and round, conjunctivae and lids unremarkable, sclera white, no " xanthalasma, EOMS intact, no nystagmus        Lymph:      ENT:  no pallor or cyanosis, dentition good        Neck:  carotid pulses are full and equal bilaterally;no carotid bruit        Respiratory:  normal breath sounds, clear to auscultation, normal A-P diameter, normal symmetry, normal respiratory excursion, no use of accessory muscles         Cardiac: regular rhythm;no murmurs, gallops or rubs detected                pulses full and equal                                        GI:    obese      Extremities and Muscular Skeletal:  no edema;no spinal abnormalities noted;normal muscle strength and tone   varicose vein RLE edema;trace   Right groin small hematoma with ecchymoses no bruit.    Neurological:  no gross motor deficits        Psych:  affect appropriate, oriented to time, person and place Anxious    CC  Jude Pérez MD  [address]  [place] [address]      Thank you for allowing me to participate in the care of your patient.      Sincerely,     Jude Pérez MD     [facility]

## 2023-12-11 ENCOUNTER — TRANSFERRED RECORDS (OUTPATIENT)
Dept: HEALTH INFORMATION MANAGEMENT | Facility: CLINIC | Age: 63
End: 2023-12-11
Payer: COMMERCIAL

## 2023-12-18 ENCOUNTER — HOSPITAL ENCOUNTER (OUTPATIENT)
Dept: GENERAL RADIOLOGY | Facility: CLINIC | Age: 63
Discharge: HOME | End: 2023-12-18
Attending: OTOLARYNGOLOGY | Admitting: OTOLARYNGOLOGY
Payer: COMMERCIAL

## 2023-12-18 DIAGNOSIS — K21.9 LARYNGOPHARYNGEAL REFLUX: ICD-10-CM

## 2023-12-18 PROCEDURE — 250N000013 HC RX MED GY IP 250 OP 250 PS 637: Performed by: OTOLARYNGOLOGY

## 2023-12-18 PROCEDURE — 74221 X-RAY XM ESOPHAGUS 2CNTRST: CPT

## 2023-12-18 RX ADMIN — ANTACID/ANTIFLATULENT 4 G: 380; 550; 10; 10 GRANULE, EFFERVESCENT ORAL at 10:25

## 2023-12-21 ENCOUNTER — OFFICE VISIT (OUTPATIENT)
Dept: FAMILY MEDICINE | Facility: CLINIC | Age: 63
End: 2023-12-21
Payer: COMMERCIAL

## 2023-12-21 VITALS
WEIGHT: 210.6 LBS | DIASTOLIC BLOOD PRESSURE: 80 MMHG | RESPIRATION RATE: 18 BRPM | TEMPERATURE: 98.2 F | SYSTOLIC BLOOD PRESSURE: 116 MMHG | HEART RATE: 65 BPM | BODY MASS INDEX: 33.85 KG/M2 | HEIGHT: 66 IN | OXYGEN SATURATION: 96 %

## 2023-12-21 DIAGNOSIS — E78.2 MIXED HYPERCHOLESTEROLEMIA AND HYPERTRIGLYCERIDEMIA: ICD-10-CM

## 2023-12-21 DIAGNOSIS — D12.4 ADENOMATOUS POLYP OF DESCENDING COLON: ICD-10-CM

## 2023-12-21 DIAGNOSIS — E55.9 VITAMIN D DEFICIENCY: ICD-10-CM

## 2023-12-21 DIAGNOSIS — Z00.00 ENCOUNTER FOR ANNUAL PHYSICAL EXAM: Primary | ICD-10-CM

## 2023-12-21 DIAGNOSIS — I25.10 ASCVD (ARTERIOSCLEROTIC CARDIOVASCULAR DISEASE): ICD-10-CM

## 2023-12-21 DIAGNOSIS — E66.811 CLASS 1 OBESITY DUE TO EXCESS CALORIES WITH SERIOUS COMORBIDITY AND BODY MASS INDEX (BMI) OF 32.0 TO 32.9 IN ADULT: ICD-10-CM

## 2023-12-21 DIAGNOSIS — R97.20 ELEVATED PSA: ICD-10-CM

## 2023-12-21 DIAGNOSIS — R73.01 IMPAIRED FASTING GLUCOSE: ICD-10-CM

## 2023-12-21 DIAGNOSIS — N40.0 BENIGN NON-NODULAR PROSTATIC HYPERPLASIA WITHOUT LOWER URINARY TRACT SYMPTOMS: ICD-10-CM

## 2023-12-21 DIAGNOSIS — Z23 HIGH PRIORITY FOR 2019-NCOV VACCINE: ICD-10-CM

## 2023-12-21 DIAGNOSIS — E66.09 CLASS 1 OBESITY DUE TO EXCESS CALORIES WITH SERIOUS COMORBIDITY AND BODY MASS INDEX (BMI) OF 32.0 TO 32.9 IN ADULT: ICD-10-CM

## 2023-12-21 DIAGNOSIS — E03.9 HYPOTHYROIDISM, UNSPECIFIED TYPE: ICD-10-CM

## 2023-12-21 PROBLEM — Z98.890 STATUS POST CORONARY ANGIOGRAM: Status: RESOLVED | Noted: 2023-06-23 | Resolved: 2023-12-21

## 2023-12-21 PROBLEM — T14.8XXA HEMATOMA OF SKIN: Status: RESOLVED | Noted: 2023-06-30 | Resolved: 2023-12-21

## 2023-12-21 PROBLEM — R94.39 ABNORMAL CARDIOVASCULAR STRESS TEST: Status: RESOLVED | Noted: 2021-07-20 | Resolved: 2023-12-21

## 2023-12-21 PROBLEM — R06.02 SHORTNESS OF BREATH: Status: RESOLVED | Noted: 2023-10-20 | Resolved: 2023-12-21

## 2023-12-21 LAB
ALBUMIN SERPL BCG-MCNC: 4.1 G/DL (ref 3.5–5.2)
ALP SERPL-CCNC: 80 U/L (ref 40–150)
ALT SERPL W P-5'-P-CCNC: 18 U/L (ref 0–70)
ANION GAP SERPL CALCULATED.3IONS-SCNC: 9 MMOL/L (ref 7–15)
AST SERPL W P-5'-P-CCNC: 25 U/L (ref 0–45)
BILIRUB SERPL-MCNC: 0.4 MG/DL
BUN SERPL-MCNC: 13.3 MG/DL (ref 8–23)
CALCIUM SERPL-MCNC: 9.3 MG/DL (ref 8.8–10.2)
CHLORIDE SERPL-SCNC: 103 MMOL/L (ref 98–107)
CHOLEST SERPL-MCNC: 131 MG/DL
CREAT SERPL-MCNC: 1.11 MG/DL (ref 0.67–1.17)
DEPRECATED HCO3 PLAS-SCNC: 26 MMOL/L (ref 22–29)
EGFRCR SERPLBLD CKD-EPI 2021: 75 ML/MIN/1.73M2
ERYTHROCYTE [DISTWIDTH] IN BLOOD BY AUTOMATED COUNT: 13.3 % (ref 10–15)
FASTING STATUS PATIENT QL REPORTED: YES
GLUCOSE SERPL-MCNC: 114 MG/DL (ref 70–99)
HBA1C MFR BLD: 6.1 % (ref 0–5.6)
HCT VFR BLD AUTO: 43.5 % (ref 40–53)
HDLC SERPL-MCNC: 47 MG/DL
HGB BLD-MCNC: 14.3 G/DL (ref 13.3–17.7)
LDLC SERPL CALC-MCNC: 59 MG/DL
MCH RBC QN AUTO: 28.7 PG (ref 26.5–33)
MCHC RBC AUTO-ENTMCNC: 32.9 G/DL (ref 31.5–36.5)
MCV RBC AUTO: 87 FL (ref 78–100)
NONHDLC SERPL-MCNC: 84 MG/DL
PLATELET # BLD AUTO: 229 10E3/UL (ref 150–450)
POTASSIUM SERPL-SCNC: 4.4 MMOL/L (ref 3.4–5.3)
PROT SERPL-MCNC: 6.8 G/DL (ref 6.4–8.3)
RBC # BLD AUTO: 4.99 10E6/UL (ref 4.4–5.9)
SODIUM SERPL-SCNC: 138 MMOL/L (ref 135–145)
TRIGL SERPL-MCNC: 127 MG/DL
TSH SERPL DL<=0.005 MIU/L-ACNC: 2.6 UIU/ML (ref 0.3–4.2)
VIT D+METAB SERPL-MCNC: 30 NG/ML (ref 20–50)
WBC # BLD AUTO: 8 10E3/UL (ref 4–11)

## 2023-12-21 PROCEDURE — 80053 COMPREHEN METABOLIC PANEL: CPT | Performed by: INTERNAL MEDICINE

## 2023-12-21 PROCEDURE — 82306 VITAMIN D 25 HYDROXY: CPT | Performed by: INTERNAL MEDICINE

## 2023-12-21 PROCEDURE — 80061 LIPID PANEL: CPT | Performed by: INTERNAL MEDICINE

## 2023-12-21 PROCEDURE — 90480 ADMN SARSCOV2 VAC 1/ONLY CMP: CPT | Performed by: INTERNAL MEDICINE

## 2023-12-21 PROCEDURE — 99396 PREV VISIT EST AGE 40-64: CPT | Mod: 25 | Performed by: INTERNAL MEDICINE

## 2023-12-21 PROCEDURE — 83036 HEMOGLOBIN GLYCOSYLATED A1C: CPT | Performed by: INTERNAL MEDICINE

## 2023-12-21 PROCEDURE — 85027 COMPLETE CBC AUTOMATED: CPT | Performed by: INTERNAL MEDICINE

## 2023-12-21 PROCEDURE — 36415 COLL VENOUS BLD VENIPUNCTURE: CPT | Performed by: INTERNAL MEDICINE

## 2023-12-21 PROCEDURE — 99213 OFFICE O/P EST LOW 20 MIN: CPT | Mod: 25 | Performed by: INTERNAL MEDICINE

## 2023-12-21 PROCEDURE — 84443 ASSAY THYROID STIM HORMONE: CPT | Performed by: INTERNAL MEDICINE

## 2023-12-21 PROCEDURE — 91320 SARSCV2 VAC 30MCG TRS-SUC IM: CPT | Performed by: INTERNAL MEDICINE

## 2023-12-21 ASSESSMENT — PAIN SCALES - GENERAL: PAINLEVEL: NO PAIN (1)

## 2023-12-21 NOTE — RESULT ENCOUNTER NOTE
It was nice seeing you for your physical.  You should be able to view your test results.    Your CBC your blood count is normal with no signs of anemia or blood disorders.  Your chemistry panel shows a slightly elevated blood glucose.  Another diabetes test which looks back over 3 months called the hemoglobin A1c is also slightly high at 6.1.  I would stress getting the weight down to improve this.    Your blood salts, kidney tests, liver tests, and proteins are normal.  Your vitamin D level and thyroid tests are also normal.    Your total cholesterol is good at 131.  Your HDL or good cholesterol and LDL or bad cholesterol are fine as well.    Overall your labs look good.  There is room for improvement and as we discussed weight loss is the way to go.  Please let me know if you would like to try the injectable medication.    Alejandro Lewis M.D.

## 2023-12-21 NOTE — PROGRESS NOTES
SUBJECTIVE:   Darrell is a 63 year old, presenting for the following:  Overall he is doing well.  No chest pain or shortness of breath and no more GI symptoms.  He is up-to-date with his urologist.  He has been having some swallowing or dysphagia issues and was seen by ENT and esophagram was done.  He will follow-up with him for that.  His weight is an issue and we discussed at length the GLP-1 drugs and I think he be an excellent candidate for it.  He will consider it.  He is exercising some.    No chief complaint on file.        Healthy Habits:     Getting at least 3 servings of Calcium per day:  NO    Bi-annual eye exam:  Yes    Dental care twice a year:  Yes    Sleep apnea or symptoms of sleep apnea:  Daytime drowsiness    Diet:  Regular (no restrictions) and Carbohydrate counting    Frequency of exercise:  2-3 days/week    Duration of exercise:  15-30 minutes    Taking medications regularly:  Yes    Medication side effects:  None    Additional concerns today:  Yes               Past Medical History:      Past Medical History:   Diagnosis Date    Adenomatous polyp of transverse colon 08/2021    fu 7 years    BPH (benign prostatic hyperplasia) 2016    Dr. Gonzalez    Burning sensation of throat 2021    egd nl,    Chest pain 01/2014    neg est echo    Coronary artery disease of native artery of native heart with stable angina pectoris (H24) 07/20/2021    based on symptoms 5/21 and positive est, then seen by [facility] and med mgmt, then stenting done 6/23 with multivessel cad    Elevated PSA 2016    Dr. Gonzalez, bx neg, seen by Dr Brown 2022, had mri 2021    Erectile dysfunction     Dr. Gonzalez    Gastritis     Hypercholesteremia     Hypothyroid 2007    Normal colonoscopy 2010    Vitamin D deficiency 2009    Vitamin D2/D3 of 19             Past Surgical History:      Past Surgical History:   Procedure Laterality Date    ABDOMEN SURGERY  01/08/2021    Incisional Hernia    BIOPSY  09/2015    Prostate. Later PSA results were  found good.    COLONOSCOPY  2010    No issue found.    CV CORONARY ANGIOGRAM N/A 06/23/2023    Procedure: Coronary Angiogram;  Surgeon: Chuck Priest MD;  Location:  HEART CARDIAC CATH LAB    CV LEFT HEART CATH N/A 06/23/2023    Procedure: Left Heart Catheterization;  Surgeon: Chuck Priest MD;  Location:  HEART CARDIAC CATH LAB    CV PCI N/A 06/23/2023    Procedure: Percutaneous Coronary Intervention;  Surgeon: Chuck Priest MD;  Location:  HEART CARDIAC CATH LAB    HERNIA REPAIR      Incisional Hernia    LAPAROSCOPIC CHOLECYSTECTOMY N/A 11/16/2015    Procedure: LAPAROSCOPIC CHOLECYSTECTOMY;  Surgeon: Sivakumar Burr MD;  Location: [facility]    LAPAROSCOPIC HERNIORRHAPHY VENTRAL N/A 01/08/2021    Procedure: LAPAROSCOPIC VENTRAL HERNIA REPAIR WITH MESH;  Surgeon: Sivakumar Burr MD;  Location:  OR    PROSTATE SURGERY      Trus BX    VASCULAR SURGERY  6/23/2023    Stents in two blocked arteries             Social History:     Social History     Socioeconomic History    Marital status:      Spouse name: Not on file    Number of children: 2    Years of education: Not on file    Highest education level: Not on file   Occupational History    Occupation: quality engineer     Employer: [employer]     Employer: [email or web address]   Tobacco Use    Smoking status: Never    Smokeless tobacco: Never   Vaping Use    Vaping Use: Never used   Substance and Sexual Activity    Alcohol use: Never    Drug use: Never    Sexual activity: Yes     Partners: Female     Birth control/protection: Abstinence     Comment: New medicines have affected my general routine of life   Other Topics Concern    Parent/sibling w/ CABG, MI or angioplasty before 65F 55M? No   Social History Narrative    Not on file     Social Determinants of Health     Financial Resource Strain: Low Risk  (12/19/2023)    Financial Resource Strain     Within the past 12 months, have you or your family members you live with been  unable to get utilities (heat, electricity) when it was really needed?: No   Food Insecurity: Low Risk  (12/19/2023)    Food Insecurity     Within the past 12 months, did you worry that your food would run out before you got money to buy more?: No     Within the past 12 months, did the food you bought just not last and you didn t have money to get more?: No   Transportation Needs: Low Risk  (12/19/2023)    Transportation Needs     Within the past 12 months, has lack of transportation kept you from medical appointments, getting your medicines, non-medical meetings or appointments, work, or from getting things that you need?: No   Physical Activity: Not on file   Stress: Not on file   Social Connections: Not on file   Interpersonal Safety: Not on file   Housing Stability: Low Risk  (12/19/2023)    Housing Stability     Do you have housing? : Yes     Are you worried about losing your housing?: No             Family History:   reviewed         Allergies:     Allergies   Allergen Reactions    Dust Mites      Sneezing, water eyes             Medications:     Current Outpatient Medications   Medication Sig Dispense Refill    aspirin (ASA) 81 MG EC tablet Take 1 tablet (81 mg) by mouth daily      clopidogrel (PLAVIX) 75 MG tablet Take 1 tablet (75 mg) by mouth daily Take 4 pills first dose then once daily 93 tablet 4    famotidine (PEPCID) 20 MG tablet 1 TABLET 30 MINUTES BEFORE BEDTIME. ONCE A DAY ORALLY FOR 90 DAYS      finasteride (PROSCAR) 5 MG tablet TAKE 1 TABLET(5 MG) BY MOUTH DAILY 90 tablet 2    levothyroxine (SYNTHROID/LEVOTHROID) 88 MCG tablet Take 1 tablet (88 mcg) by mouth daily 90 tablet 3    melatonin 1 MG CAPS       metoprolol succinate ER (TOPROL XL) 25 MG 24 hr tablet Take 1 tablet (25 mg) by mouth daily 90 tablet 3    nitroGLYcerin (NITROSTAT) 0.4 MG sublingual tablet For chest pain place 1 tablet under the tongue every 5 minutes for 3 doses. If symptoms persist 5 minutes after 2nd dose call [phone]. [phone]  "tablet 0    rosuvastatin (CRESTOR) 40 MG tablet Take 1 tablet (40 mg) by mouth daily 90 tablet 3    tamsulosin (FLOMAX) 0.4 MG capsule TAKE 1 CAPSULE(0.4 MG) BY MOUTH DAILY 30 MINUTES AFTER THE EVENING MEAL 90 capsule 2    Vitamin D (Cholecalciferol) 25 MCG (1000 UT) TABS Take 2,000 Units by mouth Nature made D 3                 Review of Systems:     The 10 point Review of Systems is negative other than noted in the HPI           Physical Exam:   Blood pressure 116/80, pulse 65, temperature 98.2  F (36.8  C), temperature source Temporal, resp. rate 18, height 1.681 m (5' 6.18\"), weight 95.5 kg (210 lb 9.6 oz), SpO2 96%.    Exam:  Constitutional: healthy appearing, alert and in no distress  Heent: Normocephalic. Head without obvious masses or lesions. PERRLDC, EOMI. Mouth exam within normal limits: tongue, mucous membranes, posterior pharynx all normal, no lesions or abnormalities seen.  Tm's and canals within normal limits bilaterally. Neck supple, no nuchal rigidity or masses. No supraclavicular, or cervical adenopathy. Thyroid symmetric, no masses.  Cardiovascular: Regular rate and rhythm, no murmer, rub or gallops.  JVP not elevated, no edema.  Carotids within normal limits bilaterally, no bruits.  Respiratory: Normal respiratory effort.  Lungs clear, normal flow, no wheezing or crackles.  Breasts: Normal bilaterally.  No masses or lesions.  Nipples within normal limites.  No axillary lesions or nodes.  Gastrointestinal: Normal active bowel sounds.   Soft, not tender, no masses, guarding or rebound.  No hepatosplenomegaly.   Genitourinary: Rectal per urol  Musculoskeletal: extremities normal, no gross deformities noted.  Skin: no suspicious lesions or rashes   Neurologic: Mental status within normal limits.  Speech fluent.  No gross motor abnormalities and gait intact.  Psychiatric: mentation appears normal and affect normal.         Data:   Labs sent        Assessment:   Normal complete physical exam  Obesity " with coronary disease, I believe he may have an excellent candidate for  semaglutide for both weight and heart disease prevention.  He will check with insurance  Stable coronary disease  Hypothyroidism, follow-up TSH  Elevated sugar, labs and weight loss  Colon polyp, up-to-date on follow-up  Elevated cholesterol, med management  BPH with elevated PSA, follow-up urology  Vitamin D deficiency, labs today  Healthcare maintenance         Plan:   He does not want flu shot  COVID-vaccine  Exercise, diet and weight loss  Consider Wegovy  Letter with labs      Alejandro Lewis M.D.

## 2023-12-21 NOTE — PATIENT INSTRUCTIONS
I would consider starting the medicine Wegovy to help your weight and to help reduce the risk of future heart events.    Alejandro Lewis M.D.

## 2024-01-26 DIAGNOSIS — E03.9 HYPOTHYROIDISM, UNSPECIFIED TYPE: ICD-10-CM

## 2024-01-26 RX ORDER — LEVOTHYROXINE SODIUM 88 UG/1
88 TABLET ORAL DAILY
Qty: 90 TABLET | Refills: 2 | Status: SHIPPED | OUTPATIENT
Start: 2024-01-26

## 2024-01-26 NOTE — TELEPHONE ENCOUNTER
Patient calling clinic to request refills for levothyroxine script. Patient did not have any additional questions or concerns.     Script and pharmacy pended. Routing to refill pool for review.

## 2024-07-11 DIAGNOSIS — I25.118 CORONARY ARTERY DISEASE OF NATIVE ARTERY OF NATIVE HEART WITH STABLE ANGINA PECTORIS (H): ICD-10-CM

## 2024-07-11 RX ORDER — METOPROLOL SUCCINATE 25 MG/1
25 TABLET, EXTENDED RELEASE ORAL DAILY
Qty: 90 TABLET | Refills: 0 | Status: SHIPPED | OUTPATIENT
Start: 2024-07-11

## 2024-07-11 NOTE — LETTER
July 11, 2024       TO: Sarkis Darnell  [address]  [place] [address]       Dear Sarkis Darnell,    We recently received a call from your pharmacy requesting a refill of your medication(s).    Our records indicate that you are due for follow-up with your Heart Care Provider. We will refill your medications for 3 months which will allow you enough time to be seen.    Please call [phone] to schedule your appointment.    Thank you for allowing [facility] to be a part of your health care team and we look forward to seeing you soon.    Thank you,    [facility]

## 2024-07-12 RX ORDER — ROSUVASTATIN CALCIUM 40 MG/1
40 TABLET, COATED ORAL DAILY
Qty: 90 TABLET | Refills: 0 | Status: SHIPPED | OUTPATIENT
Start: 2024-07-12

## 2024-08-27 ENCOUNTER — OFFICE VISIT (OUTPATIENT)
Dept: URGENT CARE | Facility: URGENT CARE | Age: 64
End: 2024-08-27
Payer: COMMERCIAL

## 2024-08-27 VITALS
HEART RATE: 61 BPM | SYSTOLIC BLOOD PRESSURE: 129 MMHG | DIASTOLIC BLOOD PRESSURE: 80 MMHG | RESPIRATION RATE: 16 BRPM | TEMPERATURE: 98.7 F | BODY MASS INDEX: 35.31 KG/M2 | WEIGHT: 220 LBS | OXYGEN SATURATION: 98 %

## 2024-08-27 DIAGNOSIS — U07.1 INFECTION DUE TO 2019 NOVEL CORONAVIRUS: Primary | ICD-10-CM

## 2024-08-27 PROCEDURE — 99214 OFFICE O/P EST MOD 30 MIN: CPT | Performed by: FAMILY MEDICINE

## 2024-08-28 NOTE — PROGRESS NOTES
Assessment & Plan     Infection due to 2019 novel coronavirus  COVID pos  On plavix so not candidate for paxlovid               No follow-ups on file.    Chris Tenorio MD  [facility] URGENT CARE MICHELLE Ramírez is a 64 year old male who presents to clinic today for the following health issues:  Chief Complaint   Patient presents with    Urgent Care    Covid Concern     Pt reports he tested positive for Covid today (at home test)  Complains of fatigue, congestion and slight cough onset Saturday   Burning in eyes and pressure in ear   Would like paxlovid treatment  Recently travel to [facility]        HPI    COVID pos  Plavix  Fatigue and congestion  Eye burning.  Stuffy nose.  Light cough  Some SOB with walking  No trouble breathing.  Started Saturday with symptoms.    Was in [place] and [place]         Review of Systems        Objective    /80 (BP Location: Left arm, Patient Position: Sitting, Cuff Size: Adult Large)   Pulse 61   Temp 98.7  F (37.1  C) (Tympanic)   Resp 16   Wt 99.8 kg (220 lb)   SpO2 98%   BMI 35.31 kg/m    Physical Exam  Vitals and nursing note reviewed.   Constitutional:       Appearance: Normal appearance.   HENT:      Right Ear: Tympanic membrane and ear canal normal.      Left Ear: Tympanic membrane and ear canal normal.   Eyes:      Pupils: Pupils are equal, round, and reactive to light.   Cardiovascular:      Rate and Rhythm: Normal rate and regular rhythm.      Pulses: Normal pulses.      Heart sounds: Normal heart sounds.   Pulmonary:      Effort: Pulmonary effort is normal.      Breath sounds: Normal breath sounds.   Musculoskeletal:      Cervical back: Neck supple.   Neurological:      Mental Status: He is alert.

## 2024-10-02 ENCOUNTER — MYC MEDICAL ADVICE (OUTPATIENT)
Dept: CARDIOLOGY | Facility: CLINIC | Age: 64
End: 2024-10-02
Payer: COMMERCIAL

## 2024-10-02 NOTE — TELEPHONE ENCOUNTER
Patient sent a mychart about hold duration of DAPT prior to urology procedure. Recommended he contact urology directly for hold request/information and then nursing can send to Dr Pérez.     Last intervention was 6/2023. Annual follow up is scheduled for January.

## 2024-10-07 NOTE — TELEPHONE ENCOUNTER
Reply from patient:  Efraín Carpio,     I got a follow up call from Shireen regarding any update on the procedures.     Per my discussion with Dr. Brown (Urologist), he is not anticipating excessive bleeding issues with the two procedures. Cystoscopy may cause light scratch but he was not leaning on stopping Plavix.     If you still want me to stop it for a day or two to stay safe,  please let me know.     Thanks,   Darrell   ==========    Iván, Mr. Darnell,    If your Urology provider is not advising a med hold, then we would prefer to continue your medications as usual.    Thank you for checking  Team 2 R.N.s  [phone]

## 2024-10-07 NOTE — TELEPHONE ENCOUNTER
Darrell Darnell [facility] Heart Team 2  Phone Number: [phone]     Hi Dr. Pérez,    I am scheduled for cystoscopy and ultrasound (TRUS) of prostrate by my Urologist. Do I need to stop taking Plavix and Baby Aspirin for a few days to avoid excessive bleeding in case of any scratch or cut?    Darrell Neal    10/7/2024 1045 attempted to contact patient to clarify if he needs a hold plan as requested last week. Reminded patient to contact Urology for their protocol.

## 2024-10-08 ENCOUNTER — TRANSFERRED RECORDS (OUTPATIENT)
Dept: HEALTH INFORMATION MANAGEMENT | Facility: CLINIC | Age: 64
End: 2024-10-08

## 2024-10-16 DIAGNOSIS — I25.118 CORONARY ARTERY DISEASE OF NATIVE ARTERY OF NATIVE HEART WITH STABLE ANGINA PECTORIS (H): ICD-10-CM

## 2024-10-16 RX ORDER — ROSUVASTATIN CALCIUM 40 MG/1
40 TABLET, COATED ORAL DAILY
Qty: 90 TABLET | Refills: 0 | Status: SHIPPED | OUTPATIENT
Start: 2024-10-16

## 2024-10-16 RX ORDER — METOPROLOL SUCCINATE 25 MG/1
25 TABLET, EXTENDED RELEASE ORAL DAILY
Qty: 90 TABLET | Refills: 0 | Status: SHIPPED | OUTPATIENT
Start: 2024-10-16

## 2024-10-30 ENCOUNTER — TRANSFERRED RECORDS (OUTPATIENT)
Dept: MULTI SPECIALTY CLINIC | Facility: CLINIC | Age: 64
End: 2024-10-30

## 2024-10-30 LAB — RETINOPATHY: NORMAL

## 2024-11-06 DIAGNOSIS — I25.118 CORONARY ARTERY DISEASE OF NATIVE ARTERY OF NATIVE HEART WITH STABLE ANGINA PECTORIS (H): ICD-10-CM

## 2024-11-06 RX ORDER — CLOPIDOGREL BISULFATE 75 MG/1
75 TABLET ORAL DAILY
Qty: 90 TABLET | Refills: 0 | Status: SHIPPED | OUTPATIENT
Start: 2024-11-06

## 2024-12-17 ENCOUNTER — DOCUMENTATION ONLY (OUTPATIENT)
Dept: LAB | Facility: CLINIC | Age: 64
End: 2024-12-17
Payer: COMMERCIAL

## 2024-12-17 NOTE — PROGRESS NOTES
Sarkis Darnell has an upcoming lab appointment:    Future Appointments   Date Time Provider Department Center   12/23/2024  8:30 AM Alejandro Lewis MD [facility] CS   1/3/2025 10:15 AM EC LAB [facility] EC   1/7/2025  8:30 AM Jude Pérez MD [facility] PSA CLIN     Patient is scheduled for the following lab(s): pre-appt labs    There is no order available. Please review and place future orders as appropriate.      Tonia Dillard

## 2024-12-20 SDOH — HEALTH STABILITY: PHYSICAL HEALTH: ON AVERAGE, HOW MANY MINUTES DO YOU ENGAGE IN EXERCISE AT THIS LEVEL?: 20 MIN

## 2024-12-20 SDOH — HEALTH STABILITY: PHYSICAL HEALTH: ON AVERAGE, HOW MANY DAYS PER WEEK DO YOU ENGAGE IN MODERATE TO STRENUOUS EXERCISE (LIKE A BRISK WALK)?: 3 DAYS

## 2024-12-20 ASSESSMENT — SOCIAL DETERMINANTS OF HEALTH (SDOH): HOW OFTEN DO YOU GET TOGETHER WITH FRIENDS OR RELATIVES?: THREE TIMES A WEEK

## 2024-12-23 ENCOUNTER — OFFICE VISIT (OUTPATIENT)
Dept: FAMILY MEDICINE | Facility: CLINIC | Age: 64
End: 2024-12-23
Payer: COMMERCIAL

## 2024-12-23 VITALS
HEIGHT: 66 IN | WEIGHT: 217 LBS | BODY MASS INDEX: 34.87 KG/M2 | SYSTOLIC BLOOD PRESSURE: 123 MMHG | HEART RATE: 50 BPM | TEMPERATURE: 97.7 F | RESPIRATION RATE: 16 BRPM | OXYGEN SATURATION: 99 % | DIASTOLIC BLOOD PRESSURE: 84 MMHG

## 2024-12-23 DIAGNOSIS — R33.9 URINARY RETENTION: ICD-10-CM

## 2024-12-23 DIAGNOSIS — N40.0 BENIGN NON-NODULAR PROSTATIC HYPERPLASIA WITHOUT LOWER URINARY TRACT SYMPTOMS: ICD-10-CM

## 2024-12-23 DIAGNOSIS — E55.9 VITAMIN D DEFICIENCY: ICD-10-CM

## 2024-12-23 DIAGNOSIS — Z00.00 ENCOUNTER FOR ANNUAL PHYSICAL EXAM: Primary | ICD-10-CM

## 2024-12-23 DIAGNOSIS — E78.2 MIXED HYPERCHOLESTEROLEMIA AND HYPERTRIGLYCERIDEMIA: ICD-10-CM

## 2024-12-23 DIAGNOSIS — R73.01 IMPAIRED FASTING GLUCOSE: ICD-10-CM

## 2024-12-23 DIAGNOSIS — R39.12 BENIGN PROSTATIC HYPERPLASIA WITH WEAK URINARY STREAM: ICD-10-CM

## 2024-12-23 DIAGNOSIS — I25.10 CORONARY ARTERY DISEASE INVOLVING NATIVE CORONARY ARTERY OF NATIVE HEART WITHOUT ANGINA PECTORIS: ICD-10-CM

## 2024-12-23 DIAGNOSIS — R97.20 ELEVATED PSA: ICD-10-CM

## 2024-12-23 DIAGNOSIS — R97.20 ELEVATED PROSTATE SPECIFIC ANTIGEN (PSA): ICD-10-CM

## 2024-12-23 DIAGNOSIS — E66.811 CLASS 1 OBESITY DUE TO EXCESS CALORIES WITH SERIOUS COMORBIDITY AND BODY MASS INDEX (BMI) OF 32.0 TO 32.9 IN ADULT: ICD-10-CM

## 2024-12-23 DIAGNOSIS — R39.15 URGENCY OF URINATION: ICD-10-CM

## 2024-12-23 DIAGNOSIS — E03.9 HYPOTHYROIDISM, UNSPECIFIED TYPE: ICD-10-CM

## 2024-12-23 DIAGNOSIS — N40.1 BENIGN PROSTATIC HYPERPLASIA WITH WEAK URINARY STREAM: ICD-10-CM

## 2024-12-23 DIAGNOSIS — K21.9 GASTROESOPHAGEAL REFLUX DISEASE WITHOUT ESOPHAGITIS: ICD-10-CM

## 2024-12-23 DIAGNOSIS — D12.4 ADENOMATOUS POLYP OF DESCENDING COLON: ICD-10-CM

## 2024-12-23 DIAGNOSIS — E66.09 CLASS 1 OBESITY DUE TO EXCESS CALORIES WITH SERIOUS COMORBIDITY AND BODY MASS INDEX (BMI) OF 32.0 TO 32.9 IN ADULT: ICD-10-CM

## 2024-12-23 PROBLEM — I25.118 CORONARY ARTERY DISEASE OF NATIVE ARTERY OF NATIVE HEART WITH STABLE ANGINA PECTORIS (H): Status: RESOLVED | Noted: 2021-07-20 | Resolved: 2024-12-23

## 2024-12-23 LAB
ALBUMIN SERPL BCG-MCNC: 4 G/DL (ref 3.5–5.2)
ALP SERPL-CCNC: 77 U/L (ref 40–150)
ALT SERPL W P-5'-P-CCNC: 18 U/L (ref 0–70)
ANION GAP SERPL CALCULATED.3IONS-SCNC: 10 MMOL/L (ref 7–15)
AST SERPL W P-5'-P-CCNC: 27 U/L (ref 0–45)
BILIRUB SERPL-MCNC: 0.4 MG/DL
BUN SERPL-MCNC: 12.9 MG/DL (ref 8–23)
CALCIUM SERPL-MCNC: 9.6 MG/DL (ref 8.8–10.4)
CHLORIDE SERPL-SCNC: 106 MMOL/L (ref 98–107)
CHOLEST SERPL-MCNC: 112 MG/DL
CREAT SERPL-MCNC: 1.05 MG/DL (ref 0.67–1.17)
EGFRCR SERPLBLD CKD-EPI 2021: 79 ML/MIN/1.73M2
ERYTHROCYTE [DISTWIDTH] IN BLOOD BY AUTOMATED COUNT: 13.4 % (ref 10–15)
EST. AVERAGE GLUCOSE BLD GHB EST-MCNC: 140 MG/DL
FASTING STATUS PATIENT QL REPORTED: YES
FASTING STATUS PATIENT QL REPORTED: YES
GLUCOSE SERPL-MCNC: 121 MG/DL (ref 70–99)
HBA1C MFR BLD: 6.5 % (ref 0–5.6)
HCO3 SERPL-SCNC: 25 MMOL/L (ref 22–29)
HCT VFR BLD AUTO: 43.1 % (ref 40–53)
HDLC SERPL-MCNC: 42 MG/DL
HGB BLD-MCNC: 13.8 G/DL (ref 13.3–17.7)
LDLC SERPL CALC-MCNC: 51 MG/DL
MCH RBC QN AUTO: 28.5 PG (ref 26.5–33)
MCHC RBC AUTO-ENTMCNC: 32 G/DL (ref 31.5–36.5)
MCV RBC AUTO: 89 FL (ref 78–100)
NONHDLC SERPL-MCNC: 70 MG/DL
PLATELET # BLD AUTO: 231 10E3/UL (ref 150–450)
POTASSIUM SERPL-SCNC: 4.4 MMOL/L (ref 3.4–5.3)
PROT SERPL-MCNC: 6.6 G/DL (ref 6.4–8.3)
RBC # BLD AUTO: 4.84 10E6/UL (ref 4.4–5.9)
SODIUM SERPL-SCNC: 141 MMOL/L (ref 135–145)
TRIGL SERPL-MCNC: 96 MG/DL
TSH SERPL DL<=0.005 MIU/L-ACNC: 2.43 UIU/ML (ref 0.3–4.2)
VIT D+METAB SERPL-MCNC: 27 NG/ML (ref 20–50)
WBC # BLD AUTO: 7.1 10E3/UL (ref 4–11)

## 2024-12-23 PROCEDURE — 85027 COMPLETE CBC AUTOMATED: CPT | Performed by: INTERNAL MEDICINE

## 2024-12-23 PROCEDURE — 84443 ASSAY THYROID STIM HORMONE: CPT | Performed by: INTERNAL MEDICINE

## 2024-12-23 PROCEDURE — 99214 OFFICE O/P EST MOD 30 MIN: CPT | Mod: 25 | Performed by: INTERNAL MEDICINE

## 2024-12-23 PROCEDURE — 36415 COLL VENOUS BLD VENIPUNCTURE: CPT | Performed by: INTERNAL MEDICINE

## 2024-12-23 PROCEDURE — 82306 VITAMIN D 25 HYDROXY: CPT | Performed by: INTERNAL MEDICINE

## 2024-12-23 PROCEDURE — 80061 LIPID PANEL: CPT | Performed by: INTERNAL MEDICINE

## 2024-12-23 PROCEDURE — 99396 PREV VISIT EST AGE 40-64: CPT | Performed by: INTERNAL MEDICINE

## 2024-12-23 PROCEDURE — 80053 COMPREHEN METABOLIC PANEL: CPT | Performed by: INTERNAL MEDICINE

## 2024-12-23 PROCEDURE — 83036 HEMOGLOBIN GLYCOSYLATED A1C: CPT | Performed by: INTERNAL MEDICINE

## 2024-12-23 RX ORDER — METOPROLOL SUCCINATE 25 MG/1
25 TABLET, EXTENDED RELEASE ORAL DAILY
Qty: 90 TABLET | Refills: 3 | Status: SHIPPED | OUTPATIENT
Start: 2024-12-23

## 2024-12-23 RX ORDER — ROSUVASTATIN CALCIUM 40 MG/1
40 TABLET, COATED ORAL DAILY
Qty: 90 TABLET | Refills: 3 | Status: SHIPPED | OUTPATIENT
Start: 2024-12-23

## 2024-12-23 RX ORDER — TAMSULOSIN HYDROCHLORIDE 0.4 MG/1
0.4 CAPSULE ORAL DAILY
Qty: 90 CAPSULE | Refills: 3 | Status: SHIPPED | OUTPATIENT
Start: 2024-12-23

## 2024-12-23 RX ORDER — NITROGLYCERIN 0.4 MG/1
TABLET SUBLINGUAL
Qty: 30 TABLET | Refills: 0 | Status: SHIPPED | OUTPATIENT
Start: 2024-12-23

## 2024-12-23 RX ORDER — LEVOTHYROXINE SODIUM 88 UG/1
88 TABLET ORAL DAILY
Qty: 90 TABLET | Refills: 3 | Status: SHIPPED | OUTPATIENT
Start: 2024-12-23

## 2024-12-23 RX ORDER — FINASTERIDE 5 MG/1
5 TABLET, FILM COATED ORAL DAILY
Qty: 90 TABLET | Refills: 3 | Status: SHIPPED | OUTPATIENT
Start: 2024-12-23

## 2024-12-23 ASSESSMENT — PAIN SCALES - GENERAL: PAINLEVEL_OUTOF10: NO PAIN (0)

## 2024-12-23 NOTE — PROGRESS NOTES
Preventive Care Visit  [facility] MICHELLE  Alejandro Lewis MD, Internal Medicine  Dec 23, 2024          Harish Ramírez is a 64 year old, presenting for the following:    Overall he is doing well.  He has urine symptoms.  He was seen by urology recommended what sounds like the Rezum procedure.  Patient is hesitant.  Apparently the surgeon told him to follow-up with me if he does want surgery.    Patient's weight is an issue and we discussed this.  I explained to him the need for weight loss and more regular exercise.  I did recommend considering GLP-1 agent given this and his history of heart disease.  He is hesitant.    He otherwise is doing well.  He is not exercising a lot.  He does not have chest pain but for a long time when he goes up hills he can get a bit short of breath.  No other complaints.               Past Medical History:      Past Medical History:   Diagnosis Date    Adenomatous polyp of transverse colon 08/2021    fu 7 years    BPH (benign prostatic hyperplasia) 2016    Dr. Gonzalez    Burning sensation of throat 2021    egd nl,    Chest pain 01/2014    neg est echo    Coronary artery disease of native artery of native heart with stable angina pectoris (H) 07/20/2021    based on symptoms 5/21 and positive est, then seen by [facility] and med mgmt, then stenting done 6/23 with multivessel cad    Elevated PSA 2016    Dr. Gonzalez, bx neg, seen by Dr Brown 2022, had mri 2021    Erectile dysfunction     Dr. Gonzalez    Gastritis     Hypercholesteremia     Hypothyroid 2007    Normal colonoscopy 2010    Vitamin D deficiency 2009    Vitamin D2/D3 of 19             Past Surgical History:      Past Surgical History:   Procedure Laterality Date    ABDOMEN SURGERY  01/08/2021    Incisional Hernia    BIOPSY  09/2015    Prostate. Later PSA results were found good.    COLONOSCOPY  2010    No issue found.    CV CORONARY ANGIOGRAM N/A 06/23/2023    Procedure: Coronary Angiogram;  Surgeon: Chuck Priest,  MD;  Location:  HEART CARDIAC CATH LAB    CV LEFT HEART CATH N/A 06/23/2023    Procedure: Left Heart Catheterization;  Surgeon: Chuck Priest MD;  Location:  HEART CARDIAC CATH LAB    CV PCI N/A 06/23/2023    Procedure: Percutaneous Coronary Intervention;  Surgeon: Chuck Priest MD;  Location:  HEART CARDIAC CATH LAB    HERNIA REPAIR      Incisional Hernia    LAPAROSCOPIC CHOLECYSTECTOMY N/A 11/16/2015    Procedure: LAPAROSCOPIC CHOLECYSTECTOMY;  Surgeon: Sivakumar Burr MD;  Location:  SD    LAPAROSCOPIC HERNIORRHAPHY VENTRAL N/A 01/08/2021    Procedure: LAPAROSCOPIC VENTRAL HERNIA REPAIR WITH MESH;  Surgeon: Sivakumar Burr MD;  Location:  OR    PROSTATE SURGERY      Trus BX    VASCULAR SURGERY  6/23/2023    Stents in two blocked arteries             Social History:     Social History     Socioeconomic History    Marital status:      Spouse name: Not on file    Number of children: 2    Years of education: Not on file    Highest education level: Not on file   Occupational History    Occupation: quality engineer     Employer: [employer]     Employer: [employer]   Tobacco Use    Smoking status: Never    Smokeless tobacco: Never   Vaping Use    Vaping status: Never Used   Substance and Sexual Activity    Alcohol use: Never    Drug use: Never    Sexual activity: Yes     Partners: Female     Birth control/protection: Abstinence     Comment: New medicines have affected my general routine of life   Other Topics Concern    Parent/sibling w/ CABG, MI or angioplasty before 65F 55M? No   Social History Narrative    Not on file     Social Drivers of Health     Financial Resource Strain: Low Risk  (12/20/2024)    Financial Resource Strain     Within the past 12 months, have you or your family members you live with been unable to get utilities (heat, electricity) when it was really needed?: No   Food Insecurity: Low Risk  (12/20/2024)    Food Insecurity     Within the past 12 months,  did you worry that your food would run out before you got money to buy more?: No     Within the past 12 months, did the food you bought just not last and you didn t have money to get more?: No   Transportation Needs: Low Risk  (12/20/2024)    Transportation Needs     Within the past 12 months, has lack of transportation kept you from medical appointments, getting your medicines, non-medical meetings or appointments, work, or from getting things that you need?: No   Physical Activity: Insufficiently Active (12/20/2024)    Exercise Vital Sign     Days of Exercise per Week: 3 days     Minutes of Exercise per Session: 20 min   Stress: No Stress Concern Present (12/20/2024)    [employer] [place] [employer] - Occupational Stress Questionnaire     Feeling of Stress : Only a little   Social Connections: Unknown (12/20/2024)    Social Connection and Isolation Panel [NHANES]     Frequency of Communication with Friends and Family: Not on file     Frequency of Social Gatherings with Friends and Family: Three times a week     Attends Confucianism Services: Not on file     Active Member of Clubs or Organizations: Not on file     Attends Club or Organization Meetings: Not on file     Marital Status: Not on file   Interpersonal Safety: Low Risk  (12/23/2024)    Interpersonal Safety     Do you feel physically and emotionally safe where you currently live?: Yes     Within the past 12 months, have you been hit, slapped, kicked or otherwise physically hurt by someone?: No     Within the past 12 months, have you been humiliated or emotionally abused in other ways by your partner or ex-partner?: No   Housing Stability: Low Risk  (12/20/2024)    Housing Stability     Do you have housing? : Yes     Are you worried about losing your housing?: No             Family History:   reviewed         Allergies:     Allergies   Allergen Reactions    Dust Mites      Sneezing, water eyes             Medications:     Current Outpatient  "Medications   Medication Sig Dispense Refill    aspirin (ASA) 81 MG EC tablet Take 1 tablet (81 mg) by mouth daily      clopidogrel (PLAVIX) 75 MG tablet Take 1 tablet (75 mg) by mouth daily. 90 tablet 0    famotidine (PEPCID) 20 MG tablet 1 TABLET 30 MINUTES BEFORE BEDTIME. ONCE A DAY ORALLY FOR 90 DAYS      finasteride (PROSCAR) 5 MG tablet Take 1 tablet (5 mg) by mouth daily. 90 tablet 3    levothyroxine (SYNTHROID/LEVOTHROID) 88 MCG tablet Take 1 tablet (88 mcg) by mouth daily. 90 tablet 3    melatonin 1 MG CAPS       metoprolol succinate ER (TOPROL XL) 25 MG 24 hr tablet Take 1 tablet (25 mg) by mouth daily. 90 tablet 3    nitroGLYcerin (NITROSTAT) 0.4 MG sublingual tablet For chest pain place 1 tablet under the tongue every 5 minutes for 3 doses. If symptoms persist 5 minutes after 2nd dose call 911. 30 tablet 0    rosuvastatin (CRESTOR) 40 MG tablet Take 1 tablet (40 mg) by mouth daily. 90 tablet 3    tamsulosin (FLOMAX) 0.4 MG capsule Take 1 capsule (0.4 mg) by mouth daily. 90 capsule 3    Vitamin D (Cholecalciferol) 25 MCG (1000 UT) TABS Take 2,000 Units by mouth Nature made D 3                 Review of Systems:     The 10 point Review of Systems is negative other than noted in the HPI           Physical Exam:   Blood pressure 123/84, pulse 50, temperature 97.7  F (36.5  C), temperature source Temporal, resp. rate 16, height 1.682 m (5' 6.22\"), weight 98.4 kg (217 lb), SpO2 99%.    Exam:  Constitutional: healthy appearing, alert and in no distress  Heent: Normocephalic. Head without obvious masses or lesions. PERRLDC, EOMI. Mouth exam within normal limits: tongue, mucous membranes, posterior pharynx all normal, no lesions or abnormalities seen.  Tm's and canals within normal limits bilaterally. Neck supple, no nuchal rigidity or masses. No supraclavicular, or cervical adenopathy. Thyroid symmetric, no masses.  Cardiovascular: Regular rate and rhythm, no murmer, rub or gallops.  JVP not elevated, no edema.  " Carotids within normal limits bilaterally, no bruits.  Respiratory: Normal respiratory effort.  Lungs clear, normal flow, no wheezing or crackles.  Breasts: Normal bilaterally.  No masses or lesions.  Nipples within normal limites.  No axillary lesions or nodes.  Gastrointestinal: Normal active bowel sounds.   Soft, not tender, no masses, guarding or rebound.  No hepatosplenomegaly.   Genitourinary: Rectal not done  Musculoskeletal: extremities normal, no gross deformities noted.  Skin: no suspicious lesions or rashes   Neurologic: Mental status within normal limits.  Speech fluent.  No gross motor abnormalities and gait intact.  Psychiatric: mentation appears normal and affect normal.         Data:   Labs sent        Assessment:   Normal complete physical exam  Coronary artery disease, stable, med management  Obesity, stressed the need for exercise, diet and weight loss.  Also consider GLP-1 agent.  If he wants that he will call  Colon polyp, follow-up as noted  Vitamin D deficiency, labs  Hypothyroidism, check labs  Acid reflux, controlled  Elevated PSA, BPH, as above with urology  High cholesterol, on statin therapy  Coronary artery disease, stable  Elevated sugar, weight loss  BPH  Healthcare maintenance         Plan:   He does not want vaccinations  Up-to-date colon exam  Consider GLP-1 agent  I renewed his Flomax and finasteride  Exercise, diet, weight loss  Letter with labs      Alejandro Lewis M.D.

## 2024-12-23 NOTE — RESULT ENCOUNTER NOTE
Mr. Darnell,    It was very nice to see you.  You should be able to view your test results.    Your CBC or blood count is normal with no signs of anemia or blood disorders.  Your chemistry panel shows normal blood salts, kidney tests, liver tests, proteins, thyroid test, and vitamin D level.    Your total cholesterol is good at 112.  Your HDL or good cholesterol and LDL or bad cholesterol are very good as well.    Your diabetes tests are worse this year.  The hemoglobin A1c is the main test and this looks back over 3 months.  Your level was 6.5 which is in the diabetic range.  Regular aerobic exercise but most importantly weight loss should lower this.  I know we discussed this and if you would like one of the newer weight loss injectable meds please let me know.  I think that would be worthwhile.  Otherwise please work hard on exercise and cutting calories to get this down.  I would suggest repeating this test in 6 months.    Please let me know if you have any questions.    Alejandro Lewis M.D.

## 2024-12-27 ENCOUNTER — HOSPITAL ENCOUNTER (EMERGENCY)
Facility: CLINIC | Age: 64
Discharge: HOME | End: 2024-12-27
Attending: EMERGENCY MEDICINE | Admitting: EMERGENCY MEDICINE
Payer: COMMERCIAL

## 2024-12-27 ENCOUNTER — APPOINTMENT (OUTPATIENT)
Dept: CT IMAGING | Facility: CLINIC | Age: 64
End: 2024-12-27
Attending: EMERGENCY MEDICINE
Payer: COMMERCIAL

## 2024-12-27 ENCOUNTER — APPOINTMENT (OUTPATIENT)
Dept: GENERAL RADIOLOGY | Facility: CLINIC | Age: 64
End: 2024-12-27
Attending: EMERGENCY MEDICINE
Payer: COMMERCIAL

## 2024-12-27 VITALS
OXYGEN SATURATION: 95 % | TEMPERATURE: 102.2 F | HEART RATE: 79 BPM | DIASTOLIC BLOOD PRESSURE: 71 MMHG | RESPIRATION RATE: 18 BRPM | SYSTOLIC BLOOD PRESSURE: 135 MMHG

## 2024-12-27 DIAGNOSIS — R10.9 LEFT SIDED ABDOMINAL PAIN: ICD-10-CM

## 2024-12-27 DIAGNOSIS — J10.1 INFLUENZA A: ICD-10-CM

## 2024-12-27 LAB
ALBUMIN SERPL BCG-MCNC: 4.1 G/DL (ref 3.5–5.2)
ALBUMIN UR-MCNC: NEGATIVE MG/DL
ALP SERPL-CCNC: 100 U/L (ref 40–150)
ALT SERPL W P-5'-P-CCNC: 30 U/L (ref 0–70)
AMORPH CRY #/AREA URNS HPF: ABNORMAL /HPF
ANION GAP SERPL CALCULATED.3IONS-SCNC: 9 MMOL/L (ref 7–15)
APPEARANCE UR: CLEAR
AST SERPL W P-5'-P-CCNC: 32 U/L (ref 0–45)
ATRIAL RATE - MUSE: 74 BPM
BASOPHILS # BLD AUTO: 0.1 10E3/UL (ref 0–0.2)
BASOPHILS NFR BLD AUTO: 1 %
BILIRUB SERPL-MCNC: 0.3 MG/DL
BILIRUB UR QL STRIP: NEGATIVE
BUN SERPL-MCNC: 10.8 MG/DL (ref 8–23)
CALCIUM SERPL-MCNC: 9 MG/DL (ref 8.8–10.4)
CHLORIDE SERPL-SCNC: 103 MMOL/L (ref 98–107)
COLOR UR AUTO: ABNORMAL
CREAT SERPL-MCNC: 1.1 MG/DL (ref 0.67–1.17)
DIASTOLIC BLOOD PRESSURE - MUSE: NORMAL MMHG
EGFRCR SERPLBLD CKD-EPI 2021: 75 ML/MIN/1.73M2
EOSINOPHIL # BLD AUTO: 0.2 10E3/UL (ref 0–0.7)
EOSINOPHIL NFR BLD AUTO: 2 %
ERYTHROCYTE [DISTWIDTH] IN BLOOD BY AUTOMATED COUNT: 13.4 % (ref 10–15)
FLUAV RNA SPEC QL NAA+PROBE: POSITIVE
FLUBV RNA RESP QL NAA+PROBE: NEGATIVE
GLUCOSE SERPL-MCNC: 127 MG/DL (ref 70–99)
GLUCOSE UR STRIP-MCNC: NEGATIVE MG/DL
HCO3 SERPL-SCNC: 26 MMOL/L (ref 22–29)
HCT VFR BLD AUTO: 41.7 % (ref 40–53)
HGB BLD-MCNC: 13.9 G/DL (ref 13.3–17.7)
HGB UR QL STRIP: NEGATIVE
HOLD SPECIMEN: NORMAL
IMM GRANULOCYTES # BLD: 0 10E3/UL
IMM GRANULOCYTES NFR BLD: 0 %
INTERPRETATION ECG - MUSE: NORMAL
KETONES UR STRIP-MCNC: NEGATIVE MG/DL
LACTATE SERPL-SCNC: 1.2 MMOL/L (ref 0.7–2)
LEUKOCYTE ESTERASE UR QL STRIP: NEGATIVE
LIPASE SERPL-CCNC: 55 U/L (ref 13–60)
LYMPHOCYTES # BLD AUTO: 0.4 10E3/UL (ref 0.8–5.3)
LYMPHOCYTES NFR BLD AUTO: 6 %
MCH RBC QN AUTO: 28.9 PG (ref 26.5–33)
MCHC RBC AUTO-ENTMCNC: 33.3 G/DL (ref 31.5–36.5)
MCV RBC AUTO: 87 FL (ref 78–100)
MONOCYTES # BLD AUTO: 0.5 10E3/UL (ref 0–1.3)
MONOCYTES NFR BLD AUTO: 8 %
MUCOUS THREADS #/AREA URNS LPF: PRESENT /LPF
NEUTROPHILS # BLD AUTO: 5.3 10E3/UL (ref 1.6–8.3)
NEUTROPHILS NFR BLD AUTO: 83 %
NITRATE UR QL: NEGATIVE
NRBC # BLD AUTO: 0 10E3/UL
NRBC BLD AUTO-RTO: 0 /100
P AXIS - MUSE: 50 DEGREES
PH UR STRIP: 7 [PH] (ref 5–7)
PLATELET # BLD AUTO: 197 10E3/UL (ref 150–450)
POTASSIUM SERPL-SCNC: 4.2 MMOL/L (ref 3.4–5.3)
PR INTERVAL - MUSE: 150 MS
PROT SERPL-MCNC: 7 G/DL (ref 6.4–8.3)
QRS DURATION - MUSE: 82 MS
QT - MUSE: 356 MS
QTC - MUSE: 395 MS
R AXIS - MUSE: 53 DEGREES
RBC # BLD AUTO: 4.81 10E6/UL (ref 4.4–5.9)
RBC URINE: 2 /HPF
RSV RNA SPEC NAA+PROBE: NEGATIVE
SARS-COV-2 RNA RESP QL NAA+PROBE: NEGATIVE
SODIUM SERPL-SCNC: 138 MMOL/L (ref 135–145)
SP GR UR STRIP: 1.02 (ref 1–1.03)
SYSTOLIC BLOOD PRESSURE - MUSE: NORMAL MMHG
T AXIS - MUSE: 36 DEGREES
TROPONIN T SERPL HS-MCNC: <6 NG/L
UROBILINOGEN UR STRIP-MCNC: NORMAL MG/DL
VENTRICULAR RATE- MUSE: 74 BPM
WBC # BLD AUTO: 6.4 10E3/UL (ref 4–11)
WBC URINE: 1 /HPF

## 2024-12-27 PROCEDURE — 87637 SARSCOV2&INF A&B&RSV AMP PRB: CPT | Performed by: EMERGENCY MEDICINE

## 2024-12-27 PROCEDURE — 82040 ASSAY OF SERUM ALBUMIN: CPT | Performed by: EMERGENCY MEDICINE

## 2024-12-27 PROCEDURE — 83605 ASSAY OF LACTIC ACID: CPT | Performed by: EMERGENCY MEDICINE

## 2024-12-27 PROCEDURE — 250N000009 HC RX 250: Performed by: EMERGENCY MEDICINE

## 2024-12-27 PROCEDURE — 81001 URINALYSIS AUTO W/SCOPE: CPT | Performed by: EMERGENCY MEDICINE

## 2024-12-27 PROCEDURE — 82435 ASSAY OF BLOOD CHLORIDE: CPT | Performed by: EMERGENCY MEDICINE

## 2024-12-27 PROCEDURE — 83690 ASSAY OF LIPASE: CPT | Performed by: EMERGENCY MEDICINE

## 2024-12-27 PROCEDURE — 84484 ASSAY OF TROPONIN QUANT: CPT | Performed by: EMERGENCY MEDICINE

## 2024-12-27 PROCEDURE — 99285 EMERGENCY DEPT VISIT HI MDM: CPT | Mod: 25

## 2024-12-27 PROCEDURE — 250N000013 HC RX MED GY IP 250 OP 250 PS 637: Performed by: EMERGENCY MEDICINE

## 2024-12-27 PROCEDURE — 80048 BASIC METABOLIC PNL TOTAL CA: CPT | Performed by: EMERGENCY MEDICINE

## 2024-12-27 PROCEDURE — 80053 COMPREHEN METABOLIC PANEL: CPT | Performed by: EMERGENCY MEDICINE

## 2024-12-27 PROCEDURE — 85025 COMPLETE CBC W/AUTO DIFF WBC: CPT | Performed by: EMERGENCY MEDICINE

## 2024-12-27 PROCEDURE — 93005 ELECTROCARDIOGRAM TRACING: CPT

## 2024-12-27 PROCEDURE — 74177 CT ABD & PELVIS W/CONTRAST: CPT

## 2024-12-27 PROCEDURE — 36415 COLL VENOUS BLD VENIPUNCTURE: CPT | Performed by: EMERGENCY MEDICINE

## 2024-12-27 PROCEDURE — 250N000011 HC RX IP 250 OP 636: Performed by: EMERGENCY MEDICINE

## 2024-12-27 PROCEDURE — 71046 X-RAY EXAM CHEST 2 VIEWS: CPT

## 2024-12-27 RX ORDER — IOPAMIDOL 755 MG/ML
109 INJECTION, SOLUTION INTRAVASCULAR ONCE
Status: COMPLETED | OUTPATIENT
Start: 2024-12-27 | End: 2024-12-27

## 2024-12-27 RX ORDER — MAGNESIUM HYDROXIDE/ALUMINUM HYDROXICE/SIMETHICONE 120; 1200; 1200 MG/30ML; MG/30ML; MG/30ML
30 SUSPENSION ORAL ONCE
Status: COMPLETED | OUTPATIENT
Start: 2024-12-27 | End: 2024-12-27

## 2024-12-27 RX ORDER — OSELTAMIVIR PHOSPHATE 75 MG/1
75 CAPSULE ORAL 2 TIMES DAILY
Qty: 10 CAPSULE | Refills: 0 | Status: SHIPPED | OUTPATIENT
Start: 2024-12-27 | End: 2025-01-01

## 2024-12-27 RX ORDER — ACETAMINOPHEN 325 MG/1
975 TABLET ORAL EVERY 4 HOURS PRN
Status: DISCONTINUED | OUTPATIENT
Start: 2024-12-27 | End: 2024-12-27 | Stop reason: HOSPADM

## 2024-12-27 RX ORDER — ACETAMINOPHEN 650 MG/1
650 SUPPOSITORY RECTAL EVERY 4 HOURS PRN
Status: DISCONTINUED | OUTPATIENT
Start: 2024-12-27 | End: 2024-12-27 | Stop reason: HOSPADM

## 2024-12-27 RX ADMIN — IOPAMIDOL 109 ML: 755 INJECTION, SOLUTION INTRAVENOUS at 04:41

## 2024-12-27 RX ADMIN — ACETAMINOPHEN 975 MG: 325 TABLET, FILM COATED ORAL at 03:53

## 2024-12-27 RX ADMIN — ALUMINUM HYDROXIDE, MAGNESIUM HYDROXIDE, AND SIMETHICONE 30 ML: 200; 200; 20 SUSPENSION ORAL at 04:38

## 2024-12-27 RX ADMIN — ALUMINUM HYDROXIDE, MAGNESIUM HYDROXIDE, AND SIMETHICONE 30 ML: 200; 200; 20 SUSPENSION ORAL at 06:13

## 2024-12-27 RX ADMIN — SODIUM CHLORIDE 71 ML: 9 INJECTION, SOLUTION INTRAVENOUS at 04:41

## 2024-12-27 ASSESSMENT — ACTIVITIES OF DAILY LIVING (ADL)
ADLS_ACUITY_SCORE: 41

## 2024-12-27 ASSESSMENT — COLUMBIA-SUICIDE SEVERITY RATING SCALE - C-SSRS
2. HAVE YOU ACTUALLY HAD ANY THOUGHTS OF KILLING YOURSELF IN THE PAST MONTH?: NO
6. HAVE YOU EVER DONE ANYTHING, STARTED TO DO ANYTHING, OR PREPARED TO DO ANYTHING TO END YOUR LIFE?: NO
1. IN THE PAST MONTH, HAVE YOU WISHED YOU WERE DEAD OR WISHED YOU COULD GO TO SLEEP AND NOT WAKE UP?: NO

## 2024-12-27 NOTE — ED PROVIDER NOTES
Emergency Department Note      History of Present Illness     Chief Complaint   Abdominal Pain and Cough      HPI   Sarkis Darnell is a 64 year old male with history of type 2 diabetes and GERD who presents for abdominal pain and cough. The patient reports he felt a fever on 12/24 that came with coughing and nasal drip. Fever 101. He reports having shivers. Reports taking tylenol ever 6 hours. Reports when he coughs he has 50/10 pain to his left rib area.       Independent Historian   None      Past Medical History     Medical History and Problem List   Elevated PSA  ED  Adenomatous polyp of descending colon  BPH  Class 1 obesity  GERD  Hypothyroid  Mixed hypercholesterolemia  And hypertriglyceridemia  Peripheral edema  Type 2 diabetes  Venous insufficiency  Vit D deficiency       Medications   Ventolin  Tessalon  Flexeril  Vibramycin  Flonase  Norco  Protonix  Senexon  Vit D  Asa  Plavix  Pepcid  Proscar  Synthroid  Toprol XL  Nitrostat  Crestor  Flomax    Surgical History   Incisional hernia  Prostate biopsy  Colonoscopy  CV coronary angiogram  CV left heart cath  CV PCI  Cholecystectomy  Herniorrhaphy ventral  Prostate surgery  Vascular surgery     Physical Exam     Patient Vitals for the past 24 hrs:   BP Temp Temp src Pulse Resp SpO2   12/27/24 0615 135/71 -- -- 79 -- --   12/27/24 0612 135/71 -- -- 80 -- 95 %   12/27/24 0500 134/68 -- -- 80 -- 95 %   12/27/24 0400 (!) 144/73 -- -- 75 -- 98 %   12/27/24 0351 (!) 144/75 (!) 102.2  F (39  C) Oral 72 -- 97 %   12/27/24 0130 (!) 167/80 (!) 100.8  F (38.2  C) Oral 80 18 99 %     Physical Exam  Eyes:  The pupils are equal and round    Conjunctivae and sclerae are normal  ENT:    The nose is normal    Pinnae are normal  CV:  Regular rate and rhythm     No edema  Resp:  Lungs are clear    Non-labored    No rales    No wheezing   GI:  Abdomen is soft with tenderness along the left lateral abdomen, there is no rigidity    No distension    No rebound tenderness    MS:  Normal muscular tone    No asymmetric leg swelling  Skin:  No rash or acute skin lesions noted  Neuro:   Awake, alert.      Speech is normal and fluent.    Face is symmetric.     Moves all extremities      Diagnostics     Lab Results   Labs Ordered and Resulted from Time of ED Arrival to Time of ED Departure   COMPREHENSIVE METABOLIC PANEL - Abnormal       Result Value    Sodium 138      Potassium 4.2      Carbon Dioxide (CO2) 26      Anion Gap 9      Urea Nitrogen 10.8      Creatinine 1.10      GFR Estimate 75      Calcium 9.0      Chloride 103      Glucose 127 (*)     Alkaline Phosphatase 100      AST 32      ALT 30      Protein Total 7.0      Albumin 4.1      Bilirubin Total 0.3     INFLUENZA A/B, RSV AND SARS-COV2 PCR - Abnormal    Influenza A PCR Positive (*)     Influenza B PCR Negative      RSV PCR Negative      SARS CoV2 PCR Negative     ROUTINE UA WITH MICROSCOPIC REFLEX TO CULTURE - Abnormal    Color Urine Light Yellow      Appearance Urine Clear      Glucose Urine Negative      Bilirubin Urine Negative      Ketones Urine Negative      Specific Gravity Urine 1.019      Blood Urine Negative      pH Urine 7.0      Protein Albumin Urine Negative      Urobilinogen Urine Normal      Nitrite Urine Negative      Leukocyte Esterase Urine Negative      Mucus Urine Present (*)     Amorphous Crystals Urine Few (*)     RBC Urine 2      WBC Urine 1     CBC WITH PLATELETS AND DIFFERENTIAL - Abnormal    WBC Count 6.4      RBC Count 4.81      Hemoglobin 13.9      Hematocrit 41.7      MCV 87      MCH 28.9      MCHC 33.3      RDW 13.4      Platelet Count 197      % Neutrophils 83      % Lymphocytes 6      % Monocytes 8      % Eosinophils 2      % Basophils 1      % Immature Granulocytes 0      NRBCs per 100 WBC 0      Absolute Neutrophils 5.3      Absolute Lymphocytes 0.4 (*)     Absolute Monocytes 0.5      Absolute Eosinophils 0.2      Absolute Basophils 0.1      Absolute Immature Granulocytes 0.0      Absolute  NRBCs 0.0     LIPASE - Normal    Lipase 55     TROPONIN T, HIGH SENSITIVITY - Normal    Troponin T, High Sensitivity <6     LACTIC ACID WHOLE BLOOD WITH 1X REPEAT IN 2 HR WHEN >2 - Normal    Lactic Acid, Initial 1.2         Imaging   CT Abdomen Pelvis w Contrast   Final Result   IMPRESSION:    1.  Mild diffuse urinary bladder wall thickening is likely secondary to chronic outlet obstruction from the enlarged prostate gland. There is no hydronephrosis.   2.  Colonic diverticula without acute diverticulitis. No bowel obstruction or inflammation.      Chest XR,  PA & LAT   Final Result   IMPRESSION: Mild left basilar atelectasis and scar.          EKG   ECG taken at 0146, ECG read at 0404  Normal sinus rhythm  Normal ECG   Rate 74 bpm. OK interval 150 ms. QRS duration 82 ms. QT/QTc 356/395 ms. P-R-T axes 50 53 36.    Independent Interpretation   None    ED Course      Medications Administered   Medications   acetaminophen (TYLENOL) tablet 975 mg (975 mg Oral $Given 12/27/24 0353)     Or   acetaminophen (TYLENOL) Suppository 650 mg ( Rectal See Alternative 12/27/24 0353)   alum & mag hydroxide-simethicone (MAALOX) suspension 30 mL (has no administration in time range)   alum & mag hydroxide-simethicone (MAALOX) suspension 30 mL (30 mLs Oral $Given 12/27/24 0438)   iopamidol (ISOVUE-370) solution 109 mL (109 mLs Intravenous $Given 12/27/24 0441)   sodium chloride 0.9 % bag 500mL for CT scan flush use (71 mLs Intravenous $Given 12/27/24 0441)       Procedures   Procedures     Discussion of Management   None    ED Course   ED Course as of 12/27/24 0608   Fri Dec 27, 2024   0403 I obtained history and examined the patient as noted above     0605 I rechecked the patient and explained findings.         Additional Documentation  None    Medical Decision Making / Diagnosis     CMS Diagnoses: None    MIPS       None    [facility]   Sarkis Darnell is a 64 year old male who presents to the emergency department with concerns about  left-sided abdominal pain and cough.  He notes that he had developed pain in the left side after an episode of coughing.  Now when he moves or coughs the pain is severe in that area.  Chest x-ray was obtained in triage and did not show any pneumothorax or clear infiltrate.  Patient was found to have influenza A on viral testing.  EKG shows a sinus rhythm without ST elevations or depression.  Troponin was undetectable.  Urinalysis was clear.  Based on the pain on the left side a CT scan was performed.  CT scan did not show any acute findings.  It did show some thickening of the bladder likely related to chronic bladder outlet obstruction from an enlarged prostate.  Overall work appears reassuring.  He was given Tylenol for his fever.  Recommended Tamiflu for home.  Recommended continuing Tylenol and ibuprofen at home for fevers.  Follow-up with PCP.  Return to the ER with any new or worrisome symptoms.    Disposition   The patient was discharged.     Diagnosis     ICD-10-CM    1. Influenza A  J10.1       2. Left sided abdominal pain  R10.9            Discharge Medications   New Prescriptions    OSELTAMIVIR (TAMIFLU) 75 MG CAPSULE    Take 1 capsule (75 mg) by mouth 2 times daily for 5 days.         Scribe Disclosure:  Bobby CORREIA, am serving as a scribe at 4:04 AM on 12/27/2024 to document services personally performed by Ravin Penn MD based on my observations and the provider's statements to me.        Ravin Penn MD  12/27/24 6513

## 2024-12-27 NOTE — ED TRIAGE NOTES
[facility]  ED Arrival Note    Arrives through triage. ABC's intact. A &O X4. . Pt arrives with c/o cough, chills, weakness, and L sided abdominal pain. Endorses episodes of wheezing at home.       Visitors during triage: Son      Triage Interventions: IV and labs and UA    Ambulatory: Yes    Meets Stroke Criteria?: No    Meets Trauma Criteria?: No    Shock Index (HR/SBP): <0.8, for provider reference    Directed to: Triage Lobby    Pronouns: he/him       Triage Assessment (Adult)       Row Name 12/27/24 0144          Triage Assessment    Airway WDL WDL        Respiratory WDL    Respiratory WDL WDL        Skin Circulation/Temperature WDL    Skin Circulation/Temperature WDL WDL        Cardiac WDL    Cardiac WDL WDL        Peripheral/Neurovascular WDL    Peripheral Neurovascular WDL WDL        Cognitive/Neuro/Behavioral WDL    Cognitive/Neuro/Behavioral WDL WDL

## 2024-12-29 ENCOUNTER — MYC MEDICAL ADVICE (OUTPATIENT)
Dept: CARDIOLOGY | Facility: CLINIC | Age: 64
End: 2024-12-29
Payer: COMMERCIAL

## 2024-12-30 ENCOUNTER — PATIENT OUTREACH (OUTPATIENT)
Dept: FAMILY MEDICINE | Facility: CLINIC | Age: 64
End: 2024-12-30
Payer: COMMERCIAL

## 2024-12-30 NOTE — TELEPHONE ENCOUNTER
Patient called reporting cough is better, but the left side abdominal pain under the rib cage is consistent and 9-10/10. Painful to touch and worse with cough. He has to use compression to cough and hot pack is soothing. No fever, taking Tylenol regularly. He stopped ibuprofen due to a severe nosebleed yesterday. Acid reflex is worse. Taking Mucinex or Delsym for cough. Pt is asking if his abdominal should be explored further. He would like a follow up appointment. Dr. Lewis please advise when patient should be seen.     Transitions of Care Outreach  Chief Complaint   Patient presents with    Hospital F/U       Most Recent Admission Date: 12/27/2024   Most Recent Admission Diagnosis:      Most Recent Discharge Date: 12/27/2024   Most Recent Discharge Diagnosis: Influenza A - J10.1  Left sided abdominal pain - R10.9     Transitions of Care Assessment    Discharge Assessment  How are you doing now that you are home?: Abdominal pain is worse.  How are your symptoms? (Red Flag symptoms escalate to triage hotline per guidelines): Worsening  Do you know how to contact your clinic care team if you have future questions or changes to your health status? : Yes  Does the patient have their discharge instructions? : Yes  Does the patient have questions regarding their discharge instructions? : No  Were you started on any new medications or were there changes to any of your previous medications? : No  Does the patient have all of their medications?: Yes  Do you have questions regarding any of your medications? : No  Do you have all of your needed medical supplies or equipment (DME)?  (i.e. oxygen tank, CPAP, cane, etc.): No - What equipment or supplies are needed?    Follow up Plan     Discharge Follow-Up  Discharge follow up appointment scheduled in alignment with recommended follow up timeframe or Transitions of Risk Category? (Low = within 30 days; Moderate= within 14 days; High= within 7 days): No    Future Appointments    Date Time Provider Department Center   1/7/2025  8:30 AM Jude Pérez MD [facility]   12/12/2025 10:30 AM Alejandro Lewis MD [facility]       Outpatient Plan as outlined on AVS reviewed with patient.    For any urgent concerns, please contact our 24 hour nurse triage line: [phone] ([phone])       Chelsie Peterson RN

## 2024-12-30 NOTE — TELEPHONE ENCOUNTER
So it looks like he was just in the ER and had a chest x-ray, CT of his abdomen and pelvis and labs all of which really were unremarkable.  If he is having pain when he coughs, twists, turns, or pushes on the ribs and that should be more musculoskeletal from coughing.  Unfortunately he has influenza A and this does cause a persistent cough.  He could be seen by team tomorrow or Wednesday but I am not sure there would be anything to add.  Certainly if his pain is worsening, he has a fever or shortness of breath he should let us know or go to the emergency room.    Thank you    Alejandro Lewis M.D.

## 2024-12-30 NOTE — TELEPHONE ENCOUNTER
My chart message from patient:  Darrell Darnell [facility] Heart Team 2  Phone Number: [phone]     Iván Pérez,    I am scheduled for labs on [place]. 3 for the upcoming appointment with you on Jan. 7, 2024.  Since I have recently gone through detailed blood tests as part of Annual Physical exam and recently again ordered by ER in the week of Dec. 23, do I still need to get the labs done on [place]. 3? Results are on MyChart.    I am still recovering from Influenza A-type. I am not sure if the labs with flu will be representative of my general health. Annual Physical was before I fell sick and more representative of  my general health.    Best regards,  =================    Iván, Mr. Darnell,    Thank you for the update. If the appointment on 1/3/2025 is just for the appointment with Dr. Pérez, then you can certainly cancel that. The labs you have had done recently will cover what we need for your visit on 1/7/2025    We don't see any other standing lab orderss from your other providers in the computer, so you seem to be caught up.    Thank you  Team 2 R.N.s  [phone]

## 2024-12-30 NOTE — TELEPHONE ENCOUNTER
Patient Contact     Attempt # 1     Was call answered?  No.  Left message on voicemail with information to call triage back.     On callback please inform pt on the provider's note and recommendations.     Neri Aguilar RN  [facility]

## 2024-12-31 ENCOUNTER — OFFICE VISIT (OUTPATIENT)
Dept: FAMILY MEDICINE | Facility: CLINIC | Age: 64
End: 2024-12-31
Payer: COMMERCIAL

## 2024-12-31 VITALS
HEIGHT: 66 IN | BODY MASS INDEX: 33.75 KG/M2 | RESPIRATION RATE: 16 BRPM | SYSTOLIC BLOOD PRESSURE: 110 MMHG | DIASTOLIC BLOOD PRESSURE: 63 MMHG | TEMPERATURE: 98.5 F | OXYGEN SATURATION: 98 % | HEART RATE: 64 BPM | WEIGHT: 210 LBS

## 2024-12-31 DIAGNOSIS — R07.81 RIB PAIN: ICD-10-CM

## 2024-12-31 DIAGNOSIS — J10.1 INFLUENZA A: Primary | ICD-10-CM

## 2024-12-31 PROCEDURE — 99214 OFFICE O/P EST MOD 30 MIN: CPT | Performed by: PHYSICIAN ASSISTANT

## 2024-12-31 PROCEDURE — G2211 COMPLEX E/M VISIT ADD ON: HCPCS | Performed by: PHYSICIAN ASSISTANT

## 2024-12-31 RX ORDER — CHOLECALCIFEROL (VITAMIN D3) 50 MCG
1 TABLET ORAL
COMMUNITY
Start: 2021-12-15

## 2024-12-31 RX ORDER — ALBUTEROL SULFATE 90 UG/1
1-2 INHALANT RESPIRATORY (INHALATION)
COMMUNITY

## 2024-12-31 RX ORDER — LIDOCAINE 50 MG/G
1 PATCH TOPICAL EVERY 24 HOURS
Qty: 15 PATCH | Refills: 0 | Status: SHIPPED | OUTPATIENT
Start: 2024-12-31

## 2024-12-31 RX ORDER — BENZONATATE 200 MG/1
200 CAPSULE ORAL 3 TIMES DAILY PRN
Qty: 30 CAPSULE | Refills: 0 | Status: SHIPPED | OUTPATIENT
Start: 2024-12-31

## 2024-12-31 ASSESSMENT — PAIN SCALES - GENERAL: PAINLEVEL_OUTOF10: EXTREME PAIN (9)

## 2024-12-31 NOTE — PROGRESS NOTES
"  Assessment & Plan     Influenza A  Rib pain  Appears well. In no acute distress. Vitally stable. Provided reassurance. Suspect musculoskeletal secondary to harsh coughing Has had a great work-up with CXR, CT ABD which we reviewed. Supportive care with OTC cough meds and sent Tessalon perles. Heat, lidocaine patches over affected area. Close follow-up with new or worsening symptoms which we reviewed.   - lidocaine (LIDODERM) 5 % patch  Dispense: 15 patch; Refill: 0    30 minutes spent by me on the date of the encounter doing chart review, review of test results, interpretation of tests, patient visit, and documentation       MED REC REQUIRED  Post Medication Reconciliation Status: discharge medications reconciled, continue medications without change  BMI  Estimated body mass index is 33.67 kg/m  as calculated from the following:    Height as of this encounter: 1.682 m (5' 6.22\").    Weight as of this encounter: 95.3 kg (210 lb).   Weight management plan: Discussed healthy diet and exercise guidelines      Harish Ramírez is a pleasant 64 year old, presenting for the following health issues:  ER F/U and Flank Pain (Left rib pain w/cough and some movements )    Recent CPE followed by ER visit. +Flu A. Felt feverish 12/24 with cough. Left sided rib/abdominal pain area following significant episode of coughing. CXR, EKG unremarkable. No acute findings on CT. On Tamiflu. Fever resolved. Cough slowing improving. Pain over left lower rib area improving. Pain reproducible with deep inspiration, coughing, palpation. No SOB, CP, heart racing.      Via the Health Maintenance questionnaire, the patient has reported the following services have been completed -Eye Exam: [facility] 2024-10-30, this information has been sent to the abstraction team.  History of Present Illness       Reason for visit:  Follow up after ER for persistent pain on the left side of abdomen (below the rib cag)  Symptom onset:  3-7 days ago   He is " "taking medications regularly.         Review of Systems  Constitutional, neuro, ENT, endocrine, pulmonary, cardiac, gastrointestinal, genitourinary, musculoskeletal, integument and psychiatric systems are negative, except as otherwise noted.      Objective    /63 (BP Location: Left arm, Patient Position: Sitting, Cuff Size: Adult Large)   Pulse 64   Temp 98.5  F (36.9  C) (Oral)   Resp 16   Ht 1.682 m (5' 6.22\")   Wt 95.3 kg (210 lb)   SpO2 98%   BMI 33.67 kg/m    Body mass index is 33.67 kg/m .  Physical Exam   GENERAL: alert and no distress  EYES: Eyes grossly normal to inspection, PERRL and conjunctivae and sclerae normal  NECK: no adenopathy, no asymmetry, masses, or scars  RESP: lungs clear to auscultation - no rales, rhonchi or wheezes  CV: regular rate and rhythm, normal S1 S2, no S3 or S4, no murmur, click or rub, no peripheral edema  ABDOMEN: soft, no hepatosplenomegaly, no masses and bowel sounds normal  MS: no gross musculoskeletal defects noted, no edema. Discomfort over left-sided anterior rib region. No palpable defect.   SKIN: no suspicious lesions or rashes  NEURO: Normal strength and tone, mentation intact and speech normal  PSYCH: mentation appears normal, affect normal/bright    The likelihood of other entities in the differential is insufficient to justify any further testing for them at this time. This was explained to the patient. The patient was advised that persistent or worsening symptoms would require further evaluation. Patient advised to call the office and if unable to reach to go to the emergency room if they develop any new or worsening symptoms. Expressed understanding and agreement with above stated plan.         Signed Electronically by: Sivakumar Cruz PA-C    "

## 2024-12-31 NOTE — TELEPHONE ENCOUNTER
Writer contacted patient and relayed provider message below~  Patient expressed understanding and endorses that his symptoms have improved since his ED visit, but that he would like to F/U with an available provider for his ongoing abdominal pain. Patient endorses that his pain worsens with coughing and twisting. Patient requested to schedule an appointment with team provider TODAY.   Reference message:

## 2025-01-07 ENCOUNTER — OFFICE VISIT (OUTPATIENT)
Dept: CARDIOLOGY | Facility: CLINIC | Age: 65
End: 2025-01-07
Payer: COMMERCIAL

## 2025-01-07 VITALS
DIASTOLIC BLOOD PRESSURE: 74 MMHG | WEIGHT: 211.5 LBS | HEIGHT: 66 IN | SYSTOLIC BLOOD PRESSURE: 108 MMHG | HEART RATE: 59 BPM | BODY MASS INDEX: 33.99 KG/M2 | OXYGEN SATURATION: 99 %

## 2025-01-07 DIAGNOSIS — E66.09 CLASS 1 OBESITY DUE TO EXCESS CALORIES WITH SERIOUS COMORBIDITY AND BODY MASS INDEX (BMI) OF 33.0 TO 33.9 IN ADULT: ICD-10-CM

## 2025-01-07 DIAGNOSIS — I25.10 CORONARY ARTERY DISEASE INVOLVING NATIVE CORONARY ARTERY OF NATIVE HEART WITHOUT ANGINA PECTORIS: Primary | ICD-10-CM

## 2025-01-07 DIAGNOSIS — E78.2 MIXED HYPERCHOLESTEROLEMIA AND HYPERTRIGLYCERIDEMIA: ICD-10-CM

## 2025-01-07 DIAGNOSIS — R04.0 EPISTAXIS: ICD-10-CM

## 2025-01-07 DIAGNOSIS — R73.01 IMPAIRED FASTING GLUCOSE: ICD-10-CM

## 2025-01-07 DIAGNOSIS — I87.2 VENOUS (PERIPHERAL) INSUFFICIENCY: ICD-10-CM

## 2025-01-07 DIAGNOSIS — R60.0 PERIPHERAL EDEMA: ICD-10-CM

## 2025-01-07 DIAGNOSIS — E66.811 CLASS 1 OBESITY DUE TO EXCESS CALORIES WITH SERIOUS COMORBIDITY AND BODY MASS INDEX (BMI) OF 33.0 TO 33.9 IN ADULT: ICD-10-CM

## 2025-01-07 NOTE — PROGRESS NOTES
HPI and Plan:   Darrell is a very nice 63-year-old gentleman who I take care of his father and his brother-in-law.  I stented his sister as well.     Darrell has a past medical history significant for coronary artery disease, mixed hyperlipidemia with elevated triglycerides and low HDL, impaired fasting glucose, hypothyroidism, GERD, erectile dysfunction, benign prostatic hypertrophy with elevated PSA, central obesity all consistent with metabolic syndrome, venous varicosities and periodic peripheral edema.  He is a lifelong nonsmoker.     He had chronic stable exertional angina for which I been following him for a couple of years.  He always wanted a conservative course.  2023  we did a CT angiogram demonstrating fairly heavily calcified vessels and potentially three-vessel coronary disease.  With this I was able to convince him to go to the Cath Lab.     There he was found to have three-vessel coronary disease.  Dr. Priest called me I was unable to look at the films as I was off but in discussion with the patient they decided to proceed with multivessel intervention.  I did review the angiogram.  Circumflex and left anterior descending artery was successfully stented. There is dual PDA's both with ostial lesions.  I agree initial treatment with medical management is appropriate.     Darrell had Brilinta shortness of breath which resolved with switching to clopidogrel     He is not having any chest arm neck jaw or shoulder discomfort.  No lightheadedness dizziness syncope or near syncope.  He states when he gets off the plane and walks up a steep incline he gets short of breath but no chest discomfort.  He states he recovers quickly.     We did a follow-up stress echo 2023 for which he is able to go 7-1/2 minutes without symptoms, EKG changes or echocardiographic evidence of ischemia.    He unfortunately is only exercising in a haphazard fashion.  He travels a lot for work does not do very well with his diet.  He  unfortunately has gained weight.     ASSESSMENT AND PLAN: I suspect his shortness of breath going up the incline is multifactorial including weight, deconditioning.  I do not think it is angina.    We reviewed his stress test compared it with his previous stress test which was symptomatic with significant EKG changes.      His cholesterol is well treated with total cholesterol 112, HDL 42, LDL 51 and triglycerides of 96.  ALT is normal.     To look for other risk factors I checked LP(a) and C-reactive protein both of which were normal.      He does have impaired fasting glucose we talked about the importance of a low carbohydrate diet, regular exercise to include a minimum of 30 minutes aerobic activity 5 times a week and resistance activity twice a week.  We also talked about the importance of weight loss.     Body mass index is now 33.9.  We talked about the importance of regular exercise including both aerobic activity and resistance activity.  He talks about when he lived in [place] he would walk 3 miles a day and clearly ate a much healthier diet more vegetable and mercado based with little bits of meat.  He relates his son and daughter have significant weight problems and are working on their risk factors.     Blood pressure is well controlled today we will continue his current medical regimen as is.    His only issue is recent bout with nosebleeds.  He was taking ibuprofen along with his aspirin and Plavix.  I have told him we can discontinue aspirin as he has been more than a year out from his intervention.  I have previously told him to discontinue aspirin.  We also talked about the antiplatelet effect of ibuprofen.  He mostly uses Tylenol.    He is concerned about venous varicosities of which she has minimum.  He also is concerned about his periodic peripheral edema which I have told him is due to his long plane rides, salt indiscretion, the fact that he works standing on his feet and sitting without a  significant amount of walking.  We talked about elevating his legs and using compression stockings as necessary.  I do not think he needs a referral to our venous team at this time.      I will have him establish with my partners and then he can follow with my a PCP alternating with a cardiologist.  Thank you for allowing me to participate in his care.  Sincerely,     Jude [facility]    Today's clinic visit entailed:  Review of the result(s) of each unique test - lab work  Ordering of each unique test  Prescription drug management  55 minutes spent by me on the date of the encounter doing chart review, history and exam, documentation and further activities per the note  Provider  Link to [facility] Help Grid     The level of medical decision making during this visit was of moderate complexity.      No orders of the defined types were placed in this encounter.      No orders of the defined types were placed in this encounter.      Medications Discontinued During This Encounter   Medication Reason    aspirin (ASA) 81 MG EC tablet          Encounter Diagnoses   Name Primary?    Coronary artery disease involving native coronary artery of native heart without angina pectoris Yes    Venous (peripheral) insufficiency     Peripheral edema     Class 1 obesity due to excess calories with serious comorbidity and body mass index (BMI) of 33.0 to 33.9 in adult     Mixed hypercholesterolemia and hypertriglyceridemia     Impaired fasting glucose     Epistaxis        CURRENT MEDICATIONS:  Current Outpatient Medications   Medication Sig Dispense Refill    albuterol (VENTOLIN HFA) 108 (90 Base) MCG/ACT inhaler 1-2 puffs.      benzonatate (TESSALON) 200 MG capsule Take 1 capsule (200 mg) by mouth 3 times daily as needed for cough. 30 capsule 0    clopidogrel (PLAVIX) 75 MG tablet Take 1 tablet (75 mg) by mouth daily. 90 tablet 0    famotidine (PEPCID) 20 MG tablet 1 TABLET 30 MINUTES BEFORE BEDTIME. ONCE A DAY ORALLY FOR 90  DAYS      finasteride (PROSCAR) 5 MG tablet Take 1 tablet (5 mg) by mouth daily. 90 tablet 3    levothyroxine (SYNTHROID/LEVOTHROID) 88 MCG tablet Take 1 tablet (88 mcg) by mouth daily. 90 tablet 3    lidocaine (LIDODERM) 5 % patch Place 1 patch over 12 hours onto the skin every 24 hours. To prevent lidocaine toxicity, patient should be patch free for 12 hrs daily. 15 patch 0    melatonin 1 MG CAPS       metoprolol succinate ER (TOPROL XL) 25 MG 24 hr tablet Take 1 tablet (25 mg) by mouth daily. 90 tablet 3    nitroGLYcerin (NITROSTAT) 0.4 MG sublingual tablet For chest pain place 1 tablet under the tongue every 5 minutes for 3 doses. If symptoms persist 5 minutes after 2nd dose call 911. 30 tablet 0    rosuvastatin (CRESTOR) 40 MG tablet Take 1 tablet (40 mg) by mouth daily. 90 tablet 3    tamsulosin (FLOMAX) 0.4 MG capsule Take 1 capsule (0.4 mg) by mouth daily. 90 capsule 3    vitamin D3 (CHOLECALCIFEROL) 50 mcg (2000 units) tablet 1 tablet.         ALLERGIES     Allergies   Allergen Reactions    Dust Mites      Sneezing, water eyes       PAST MEDICAL HISTORY:  Past Medical History:   Diagnosis Date    Adenomatous polyp of transverse colon 08/2021    fu 7 years    BPH (benign prostatic hyperplasia) 2016    Dr. Gonzalez    Burning sensation of throat 2021    egd nl,    Chest pain 01/2014    neg est echo    Coronary artery disease of native artery of native heart with stable angina pectoris (H) 07/20/2021    based on symptoms 5/21 and positive est, then seen by [facility] and med mgmt, then stenting done 6/23 with multivessel cad    Elevated PSA 2016    Dr. Gonzalez, bx neg, seen by Dr Brown 2022, had mri 2021    Erectile dysfunction     Dr. Gonzalez    Gastritis     Hypercholesteremia     Hypothyroid 2007    Normal colonoscopy 2010    Type 2 diabetes mellitus (H)     Vitamin D deficiency 2009    Vitamin D2/D3 of 19       PAST SURGICAL HISTORY:  Past Surgical History:   Procedure Laterality Date    ABDOMEN SURGERY   01/08/2021    Incisional Hernia    BIOPSY  09/2015    Prostate. Later PSA results were found good.    COLONOSCOPY  2010    No issue found.    CV CORONARY ANGIOGRAM N/A 06/23/2023    Procedure: Coronary Angiogram;  Surgeon: Chuck Priest MD;  Location:  HEART CARDIAC CATH LAB    CV LEFT HEART CATH N/A 06/23/2023    Procedure: Left Heart Catheterization;  Surgeon: Chuck Priest MD;  Location:  HEART CARDIAC CATH LAB    CV PCI N/A 06/23/2023    Procedure: Percutaneous Coronary Intervention;  Surgeon: Chuck Priest MD;  Location:  HEART CARDIAC CATH LAB    HERNIA REPAIR      Incisional Hernia    LAPAROSCOPIC CHOLECYSTECTOMY N/A 11/16/2015    Procedure: LAPAROSCOPIC CHOLECYSTECTOMY;  Surgeon: Sivakumar Burr MD;  Location: [facility]    LAPAROSCOPIC HERNIORRHAPHY VENTRAL N/A 01/08/2021    Procedure: LAPAROSCOPIC VENTRAL HERNIA REPAIR WITH MESH;  Surgeon: Sivakumar Burr MD;  Location:  OR    PROSTATE SURGERY      Trus BX    VASCULAR SURGERY  6/23/2023    Stents in two blocked arteries       FAMILY HISTORY:  Family History   Problem Relation Age of Onset    C.A.D. Mother     Arthritis Mother     Cancer Mother         ovarian    Coronary Artery Disease Mother         Angina, and Congestive heart failure    Other Cancer Mother         Ovarian    Osteoporosis Mother         After 70 years of age    Thyroid Disease Mother         Found low when 70+    Coronary Artery Disease Father         Angina    Hyperlipidemia Father     Hyperlipidemia Brother     Hypertension Brother     No Known Problems Sister     Cancer Maternal Grandfather         esophageal cancer    Other Cancer Maternal Grandfather         Esophagus    Cancer Paternal Grandmother         liver cancer    Other Cancer Paternal Grandmother         Liver    Cancer Paternal Grandfather         throat cancer    Other Cancer Paternal Grandfather         Throat    Hypertension Brother     Thyroid Disease Brother         Found  low when 40+       SOCIAL HISTORY:  Social History     Socioeconomic History    Marital status:      Spouse name: None    Number of children: 2    Years of education: None    Highest education level: None   Occupational History    Occupation:      Employer: [employer]     Employer: [employer]   Tobacco Use    Smoking status: Never    Smokeless tobacco: Never   Vaping Use    Vaping status: Never Used   Substance and Sexual Activity    Alcohol use: Never    Drug use: Never    Sexual activity: Yes     Partners: Female     Birth control/protection: Abstinence     Comment: New medicines have affected my general routine of life   Other Topics Concern    Parent/sibling w/ CABG, MI or angioplasty before 65F 55M? No     Social Drivers of Health     Financial Resource Strain: Low Risk  (12/20/2024)    Financial Resource Strain     Within the past 12 months, have you or your family members you live with been unable to get utilities (heat, electricity) when it was really needed?: No   Food Insecurity: Low Risk  (12/20/2024)    Food Insecurity     Within the past 12 months, did you worry that your food would run out before you got money to buy more?: No     Within the past 12 months, did the food you bought just not last and you didn t have money to get more?: No   Transportation Needs: Low Risk  (12/20/2024)    Transportation Needs     Within the past 12 months, has lack of transportation kept you from medical appointments, getting your medicines, non-medical meetings or appointments, work, or from getting things that you need?: No   Physical Activity: Insufficiently Active (12/20/2024)    Exercise Vital Sign     Days of Exercise per Week: 3 days     Minutes of Exercise per Session: 20 min   Stress: No Stress Concern Present (12/20/2024)    [employer] - Occupational Stress Questionnaire     Feeling of Stress : Only a little   Social Connections: Unknown (12/20/2024)    Social  "Connection and Isolation Panel [NHANES]     Frequency of Social Gatherings with Friends and Family: Three times a week   Interpersonal Safety: Low Risk  (12/31/2024)    Interpersonal Safety     Do you feel physically and emotionally safe where you currently live?: Yes     Within the past 12 months, have you been hit, slapped, kicked or otherwise physically hurt by someone?: No     Within the past 12 months, have you been humiliated or emotionally abused in other ways by your partner or ex-partner?: No   Housing Stability: Low Risk  (12/20/2024)    Housing Stability     Do you have housing? : Yes     Are you worried about losing your housing?: No       Review of Systems:  Skin:  Negative       Eyes:  Positive for glasses    ENT:  Positive for epistaxis    Respiratory:  Positive for shortness of breath, dyspnea on exertion     Cardiovascular:    Positive for, fatigue, edema    Gastroenterology: Negative      Genitourinary:  Positive for prostate problem    Musculoskeletal:  Negative      Neurologic:  Negative      Psychiatric:  Negative      Heme/Lymph/Imm:  Positive for weight loss, fever    Endocrine:  Negative        Physical Exam:  Vitals: /74   Pulse 59   Ht 1.682 m (5' 6.22\")   Wt 95.9 kg (211 lb 8 oz)   SpO2 99%   BMI 33.91 kg/m      Constitutional:  cooperative, alert and oriented, well developed, well nourished, in no acute distress obese      Skin:  warm and dry to the touch, no apparent skin lesions or masses noted          Head:  normocephalic, no masses or lesions        Eyes:  pupils equal and round, conjunctivae and lids unremarkable, sclera white, no xanthalasma, EOMS intact, no nystagmus        Lymph:      ENT:  no pallor or cyanosis, dentition good        Neck:  carotid pulses are full and equal bilaterally, no carotid bruit        Respiratory:  normal breath sounds, clear to auscultation, normal A-P diameter, normal symmetry, normal respiratory excursion, no use of accessory muscles     "     Cardiac: regular rhythm, no murmurs, gallops or rubs detected                pulses full and equal                                        GI:    obese      Extremities and Muscular Skeletal:  no edema, no spinal abnormalities noted, normal muscle strength and tone   varicose vein RLE edema, trace   Right groin small hematoma with ecchymoses no bruit.    Neurological:  no gross motor deficits        Psych:  affect appropriate, oriented to time, person and place Anxious      CC  No referring provider defined for this encounter.

## 2025-01-07 NOTE — LETTER
1/7/2025    Alejandro Lewis MD  [address]  [place] [address]    RE: Sarkis Darnell       Dear Colleague,     I had the pleasure of seeing Sarkis Darnell in the [facility].  HPI and Plan:   Darrell is a very nice 63-year-old gentleman who I take care of his father and his brother-in-law.  I stented his sister as well.     Darrell has a past medical history significant for coronary artery disease, mixed hyperlipidemia with elevated triglycerides and low HDL, impaired fasting glucose, hypothyroidism, GERD, erectile dysfunction, benign prostatic hypertrophy with elevated PSA, central obesity all consistent with metabolic syndrome, venous varicosities and periodic peripheral edema.  He is a lifelong nonsmoker.     He had chronic stable exertional angina for which I been following him for a couple of years.  He always wanted a conservative course.  2023  we did a CT angiogram demonstrating fairly heavily calcified vessels and potentially three-vessel coronary disease.  With this I was able to convince him to go to the Cath Lab.     There he was found to have three-vessel coronary disease.  Dr. Priest called me I was unable to look at the films as I was off but in discussion with the patient they decided to proceed with multivessel intervention.  I did review the angiogram.  Circumflex and left anterior descending artery was successfully stented. There is dual PDA's both with ostial lesions.  I agree initial treatment with medical management is appropriate.     Darrell had Brilinta shortness of breath which resolved with switching to clopidogrel     He is not having any chest arm neck jaw or shoulder discomfort.  No lightheadedness dizziness syncope or near syncope.  He states when he gets off the plane and walks up a steep incline he gets short of breath but no chest discomfort.  He states he recovers quickly.     We did a follow-up stress echo 2023 for which he is able to go 7-1/2 minutes without  symptoms, EKG changes or echocardiographic evidence of ischemia.    He unfortunately is only exercising in a haphazard fashion.  He travels a lot for work does not do very well with his diet.  He unfortunately has gained weight.     ASSESSMENT AND PLAN: I suspect his shortness of breath going up the incline is multifactorial including weight, deconditioning.  I do not think it is angina.    We reviewed his stress test compared it with his previous stress test which was symptomatic with significant EKG changes.      His cholesterol is well treated with total cholesterol 112, HDL 42, LDL 51 and triglycerides of 96.  ALT is normal.     To look for other risk factors I checked LP(a) and C-reactive protein both of which were normal.      He does have impaired fasting glucose we talked about the importance of a low carbohydrate diet, regular exercise to include a minimum of 30 minutes aerobic activity 5 times a week and resistance activity twice a week.  We also talked about the importance of weight loss.     Body mass index is now 33.9.  We talked about the importance of regular exercise including both aerobic activity and resistance activity.  He talks about when he lived in [place] he would walk 3 miles a day and clearly ate a much healthier diet more vegetable and mercado based with little bits of meat.  He relates his son and daughter have significant weight problems and are working on their risk factors.     Blood pressure is well controlled today we will continue his current medical regimen as is.    His only issue is recent bout with nosebleeds.  He was taking ibuprofen along with his aspirin and Plavix.  I have told him we can discontinue aspirin as he has been more than a year out from his intervention.  I have previously told him to discontinue aspirin.  We also talked about the antiplatelet effect of ibuprofen.  He mostly uses Tylenol.    He is concerned about venous varicosities of which she has minimum.  He  also is concerned about his periodic peripheral edema which I have told him is due to his long plane rides, salt indiscretion, the fact that he works standing on his feet and sitting without a significant amount of walking.  We talked about elevating his legs and using compression stockings as necessary.  I do not think he needs a referral to our venous team at this time.      I will have him establish with my partners and then he can follow with my a PCP alternating with a cardiologist.  Thank you for allowing me to participate in his care.  Sincerely,     Jude Pérez MD, [facility]    Today's clinic visit entailed:  Review of the result(s) of each unique test - lab work  Ordering of each unique test  Prescription drug management  55 minutes spent by me on the date of the encounter doing chart review, history and exam, documentation and further activities per the note  Provider  Link to [facility] Help Grid     The level of medical decision making during this visit was of moderate complexity.      No orders of the defined types were placed in this encounter.      No orders of the defined types were placed in this encounter.      Medications Discontinued During This Encounter   Medication Reason     aspirin (ASA) 81 MG EC tablet          Encounter Diagnoses   Name Primary?     Coronary artery disease involving native coronary artery of native heart without angina pectoris Yes     Venous (peripheral) insufficiency      Peripheral edema      Class 1 obesity due to excess calories with serious comorbidity and body mass index (BMI) of 33.0 to 33.9 in adult      Mixed hypercholesterolemia and hypertriglyceridemia      Impaired fasting glucose      Epistaxis        CURRENT MEDICATIONS:  Current Outpatient Medications   Medication Sig Dispense Refill     albuterol (VENTOLIN HFA) 108 (90 Base) MCG/ACT inhaler 1-2 puffs.       benzonatate (TESSALON) 200 MG capsule Take 1 capsule (200 mg) by mouth 3 times daily as needed for  cough. 30 capsule 0     clopidogrel (PLAVIX) 75 MG tablet Take 1 tablet (75 mg) by mouth daily. 90 tablet 0     famotidine (PEPCID) 20 MG tablet 1 TABLET 30 MINUTES BEFORE BEDTIME. ONCE A DAY ORALLY FOR 90 DAYS       finasteride (PROSCAR) 5 MG tablet Take 1 tablet (5 mg) by mouth daily. 90 tablet 3     levothyroxine (SYNTHROID/LEVOTHROID) 88 MCG tablet Take 1 tablet (88 mcg) by mouth daily. 90 tablet 3     lidocaine (LIDODERM) 5 % patch Place 1 patch over 12 hours onto the skin every 24 hours. To prevent lidocaine toxicity, patient should be patch free for 12 hrs daily. 15 patch 0     melatonin 1 MG CAPS        metoprolol succinate ER (TOPROL XL) 25 MG 24 hr tablet Take 1 tablet (25 mg) by mouth daily. 90 tablet 3     nitroGLYcerin (NITROSTAT) 0.4 MG sublingual tablet For chest pain place 1 tablet under the tongue every 5 minutes for 3 doses. If symptoms persist 5 minutes after 2nd dose call 911. 30 tablet 0     rosuvastatin (CRESTOR) 40 MG tablet Take 1 tablet (40 mg) by mouth daily. 90 tablet 3     tamsulosin (FLOMAX) 0.4 MG capsule Take 1 capsule (0.4 mg) by mouth daily. 90 capsule 3     vitamin D3 (CHOLECALCIFEROL) 50 mcg (2000 units) tablet 1 tablet.         ALLERGIES     Allergies   Allergen Reactions     Dust Mites      Sneezing, water eyes       PAST MEDICAL HISTORY:  Past Medical History:   Diagnosis Date     Adenomatous polyp of transverse colon 08/2021    fu 7 years     BPH (benign prostatic hyperplasia) 2016    Dr. Gonzalez     Burning sensation of throat 2021    egd nl,     Chest pain 01/2014    neg est echo     Coronary artery disease of native artery of native heart with stable angina pectoris (H) 07/20/2021    based on symptoms 5/21 and positive est, then seen by [facility] and med mgmt, then stenting done 6/23 with multivessel cad     Elevated PSA 2016    Dr. Gonzalez, bx neg, seen by Dr Brown 2022, had mri 2021     Erectile dysfunction     Dr. Gonzalez     Gastritis      Hypercholesteremia      Hypothyroid  2007     Normal colonoscopy 2010     Type 2 diabetes mellitus (H)      Vitamin D deficiency 2009    Vitamin D2/D3 of 19       PAST SURGICAL HISTORY:  Past Surgical History:   Procedure Laterality Date     ABDOMEN SURGERY  01/08/2021    Incisional Hernia     BIOPSY  09/2015    Prostate. Later PSA results were found good.     COLONOSCOPY  2010    No issue found.     CV CORONARY ANGIOGRAM N/A 06/23/2023    Procedure: Coronary Angiogram;  Surgeon: Chuck Priest MD;  Location:  HEART CARDIAC CATH LAB     CV LEFT HEART CATH N/A 06/23/2023    Procedure: Left Heart Catheterization;  Surgeon: Chuck Priest MD;  Location:  HEART CARDIAC CATH LAB     CV PCI N/A 06/23/2023    Procedure: Percutaneous Coronary Intervention;  Surgeon: Chuck Priest MD;  Location:  HEART CARDIAC CATH LAB     HERNIA REPAIR      Incisional Hernia     LAPAROSCOPIC CHOLECYSTECTOMY N/A 11/16/2015    Procedure: LAPAROSCOPIC CHOLECYSTECTOMY;  Surgeon: Sivakumar Burr MD;  Location: [facility]     LAPAROSCOPIC HERNIORRHAPHY VENTRAL N/A 01/08/2021    Procedure: LAPAROSCOPIC VENTRAL HERNIA REPAIR WITH MESH;  Surgeon: Sivakumar Burr MD;  Location:  OR     PROSTATE SURGERY      Trus BX     VASCULAR SURGERY  6/23/2023    Stents in two blocked arteries       FAMILY HISTORY:  Family History   Problem Relation Age of Onset     C.A.D. Mother      Arthritis Mother      Cancer Mother         ovarian     Coronary Artery Disease Mother         Angina, and Congestive heart failure     Other Cancer Mother         Ovarian     Osteoporosis Mother         After 70 years of age     Thyroid Disease Mother         Found low when 70+     Coronary Artery Disease Father         Angina     Hyperlipidemia Father      Hyperlipidemia Brother      Hypertension Brother      No Known Problems Sister      Cancer Maternal Grandfather         esophageal cancer     Other Cancer Maternal Grandfather         Esophagus     Cancer Paternal  Grandmother         liver cancer     Other Cancer Paternal Grandmother         Liver     Cancer Paternal Grandfather         throat cancer     Other Cancer Paternal Grandfather         Throat     Hypertension Brother      Thyroid Disease Brother         Found low when 40+       SOCIAL HISTORY:  Social History     Socioeconomic History     Marital status:      Spouse name: None     Number of children: 2     Years of education: None     Highest education level: None   Occupational History     Occupation: quality engineer     Employer: [employer]     Employer: [employer]   Tobacco Use     Smoking status: Never     Smokeless tobacco: Never   Vaping Use     Vaping status: Never Used   Substance and Sexual Activity     Alcohol use: Never     Drug use: Never     Sexual activity: Yes     Partners: Female     Birth control/protection: Abstinence     Comment: New medicines have affected my general routine of life   Other Topics Concern     Parent/sibling w/ CABG, MI or angioplasty before 65F 55M? No     Social Drivers of Health     Financial Resource Strain: Low Risk  (12/20/2024)    Financial Resource Strain      Within the past 12 months, have you or your family members you live with been unable to get utilities (heat, electricity) when it was really needed?: No   Food Insecurity: Low Risk  (12/20/2024)    Food Insecurity      Within the past 12 months, did you worry that your food would run out before you got money to buy more?: No      Within the past 12 months, did the food you bought just not last and you didn t have money to get more?: No   Transportation Needs: Low Risk  (12/20/2024)    Transportation Needs      Within the past 12 months, has lack of transportation kept you from medical appointments, getting your medicines, non-medical meetings or appointments, work, or from getting things that you need?: No   Physical Activity: Insufficiently Active (12/20/2024)    Exercise Vital Sign      Days of Exercise per  "Week: 3 days      Minutes of Exercise per Session: 20 min   Stress: No Stress Concern Present (12/20/2024)    [employer] - Occupational Stress Questionnaire      Feeling of Stress : Only a little   Social Connections: Unknown (12/20/2024)    Social Connection and Isolation Panel [NHANES]      Frequency of Social Gatherings with Friends and Family: Three times a week   Interpersonal Safety: Low Risk  (12/31/2024)    Interpersonal Safety      Do you feel physically and emotionally safe where you currently live?: Yes      Within the past 12 months, have you been hit, slapped, kicked or otherwise physically hurt by someone?: No      Within the past 12 months, have you been humiliated or emotionally abused in other ways by your partner or ex-partner?: No   Housing Stability: Low Risk  (12/20/2024)    Housing Stability      Do you have housing? : Yes      Are you worried about losing your housing?: No       Review of Systems:  Skin:  Negative       Eyes:  Positive for glasses    ENT:  Positive for epistaxis    Respiratory:  Positive for shortness of breath, dyspnea on exertion     Cardiovascular:    Positive for, fatigue, edema    Gastroenterology: Negative      Genitourinary:  Positive for prostate problem    Musculoskeletal:  Negative      Neurologic:  Negative      Psychiatric:  Negative      Heme/Lymph/Imm:  Positive for weight loss, fever    Endocrine:  Negative        Physical Exam:  Vitals: /74   Pulse 59   Ht 1.682 m (5' 6.22\")   Wt 95.9 kg (211 lb 8 oz)   SpO2 99%   BMI 33.91 kg/m      Constitutional:  cooperative, alert and oriented, well developed, well nourished, in no acute distress obese      Skin:  warm and dry to the touch, no apparent skin lesions or masses noted          Head:  normocephalic, no masses or lesions        Eyes:  pupils equal and round, conjunctivae and lids unremarkable, sclera white, no xanthalasma, EOMS intact, no nystagmus        Lymph:      ENT: "  no pallor or cyanosis, dentition good        Neck:  carotid pulses are full and equal bilaterally, no carotid bruit        Respiratory:  normal breath sounds, clear to auscultation, normal A-P diameter, normal symmetry, normal respiratory excursion, no use of accessory muscles         Cardiac: regular rhythm, no murmurs, gallops or rubs detected                pulses full and equal                                        GI:    obese      Extremities and Muscular Skeletal:  no edema, no spinal abnormalities noted, normal muscle strength and tone   varicose vein RLE edema, trace   Right groin small hematoma with ecchymoses no bruit.    Neurological:  no gross motor deficits        Psych:  affect appropriate, oriented to time, person and place Anxious      CC  No referring provider defined for this encounter.                  Thank you for allowing me to participate in the care of your patient.      Sincerely,     Jude Pérez MD     [facility]  cc:   No referring provider defined for this encounter.

## 2025-02-05 DIAGNOSIS — I25.118 CORONARY ARTERY DISEASE OF NATIVE ARTERY OF NATIVE HEART WITH STABLE ANGINA PECTORIS: ICD-10-CM

## 2025-02-05 RX ORDER — CLOPIDOGREL BISULFATE 75 MG/1
75 TABLET ORAL DAILY
Qty: 90 TABLET | Refills: 3 | Status: SHIPPED | OUTPATIENT
Start: 2025-02-05

## 2025-02-20 ENCOUNTER — OFFICE VISIT (OUTPATIENT)
Dept: URGENT CARE | Facility: URGENT CARE | Age: 65
End: 2025-02-20
Payer: COMMERCIAL

## 2025-02-20 VITALS
TEMPERATURE: 97.2 F | HEART RATE: 67 BPM | DIASTOLIC BLOOD PRESSURE: 59 MMHG | BODY MASS INDEX: 34.6 KG/M2 | WEIGHT: 215.8 LBS | RESPIRATION RATE: 20 BRPM | SYSTOLIC BLOOD PRESSURE: 121 MMHG | OXYGEN SATURATION: 98 %

## 2025-02-20 DIAGNOSIS — H72.91 ACUTE OTITIS MEDIA WITH PERFORATED TYMPANIC MEMBRANE, RIGHT: Primary | ICD-10-CM

## 2025-02-20 DIAGNOSIS — H66.91 ACUTE OTITIS MEDIA WITH PERFORATED TYMPANIC MEMBRANE, RIGHT: Primary | ICD-10-CM

## 2025-02-20 DIAGNOSIS — H65.92 OME (OTITIS MEDIA WITH EFFUSION), LEFT: ICD-10-CM

## 2025-02-20 RX ORDER — CIPROFLOXACIN AND DEXAMETHASONE 3; 1 MG/ML; MG/ML
4 SUSPENSION/ DROPS AURICULAR (OTIC) 2 TIMES DAILY
Qty: 10 ML | Refills: 0 | Status: SHIPPED | OUTPATIENT
Start: 2025-02-20 | End: 2025-02-27

## 2025-02-20 ASSESSMENT — ENCOUNTER SYMPTOMS: FEVER: 0

## 2025-02-20 NOTE — PROGRESS NOTES
SUBJECTIVE:   Sarkis Darnell is a 64 year old male presenting with a chief complaint of   Chief Complaint   Patient presents with    Urgent Care     Pt present with pressure in L ear, hard to hear on ear, R ear is draining, throat congestion, onset almost 1 month.        He is an established patient of [place].  States ear pain for weeks.  2-3 days right ear drainage, yellow and bloody.  No fevers and no known ear problems.          Review of Systems   Constitutional:  Negative for fever.   HENT:  Positive for ear discharge and ear pain.    All other systems reviewed and are negative.      Past Medical History:   Diagnosis Date    Adenomatous polyp of transverse colon 08/2021    fu 7 years    BPH (benign prostatic hyperplasia) 2016    Dr. Gonzalez    Burning sensation of throat 2021    egd nl,    Chest pain 01/2014    neg est echo    Coronary artery disease of native artery of native heart with stable angina pectoris 07/20/2021    based on symptoms 5/21 and positive est, then seen by erick and med [facility], then stenting done 6/23 with multivessel cad    Elevated PSA 2016    Dr. Gonzalez, bx neg, seen by Dr Brown 2022, had mri 2021    Erectile dysfunction     Dr. Gonzalez    Gastritis     Hypercholesteremia     Hypothyroid 2007    Normal colonoscopy 2010    Type 2 diabetes mellitus (H)     Vitamin D deficiency 2009    Vitamin D2/D3 of 19     Family History   Problem Relation Age of Onset    C.A.D. Mother     Arthritis Mother     Cancer Mother         ovarian    Coronary Artery Disease Mother         Angina, and Congestive heart failure    Other Cancer Mother         Ovarian    Osteoporosis Mother         After 70 years of age    Thyroid Disease Mother         Found low when 70+    Coronary Artery Disease Father         Angina    Hyperlipidemia Father     Hyperlipidemia Brother     Hypertension Brother     No Known Problems Sister     Cancer Maternal Grandfather         esophageal cancer    Other Cancer Maternal Grandfather          Esophagus    Cancer Paternal Grandmother         liver cancer    Other Cancer Paternal Grandmother         Liver    Cancer Paternal Grandfather         throat cancer    Other Cancer Paternal Grandfather         Throat    Hypertension Brother     Thyroid Disease Brother         Found low when 40+     Current Outpatient Medications   Medication Sig Dispense Refill    albuterol (VENTOLIN HFA) 108 (90 Base) MCG/ACT inhaler 1-2 puffs.      amoxicillin-clavulanate (AUGMENTIN) 875-125 MG tablet Take 1 tablet by mouth 2 times daily for 10 days. 20 tablet 0    benzonatate (TESSALON) 200 MG capsule Take 1 capsule (200 mg) by mouth 3 times daily as needed for cough. 30 capsule 0    ciprofloxacin-dexAMETHasone (CIPRODEX) 0.3-0.1 % otic suspension Place 4 drops into the right ear 2 times daily for 7 days. 10 mL 0    clopidogrel (PLAVIX) 75 MG tablet Take 1 tablet (75 mg) by mouth daily. 90 tablet 3    famotidine (PEPCID) 20 MG tablet 1 TABLET 30 MINUTES BEFORE BEDTIME. ONCE A DAY ORALLY FOR 90 DAYS      finasteride (PROSCAR) 5 MG tablet Take 1 tablet (5 mg) by mouth daily. 90 tablet 3    levothyroxine (SYNTHROID/LEVOTHROID) 88 MCG tablet Take 1 tablet (88 mcg) by mouth daily. 90 tablet 3    lidocaine (LIDODERM) 5 % patch Place 1 patch over 12 hours onto the skin every 24 hours. To prevent lidocaine toxicity, patient should be patch free for 12 hrs daily. 15 patch 0    melatonin 1 MG CAPS       metoprolol succinate ER (TOPROL XL) 25 MG 24 hr tablet Take 1 tablet (25 mg) by mouth daily. 90 tablet 3    nitroGLYcerin (NITROSTAT) 0.4 MG sublingual tablet For chest pain place 1 tablet under the tongue every 5 minutes for 3 doses. If symptoms persist 5 minutes after 2nd dose call 911. 30 tablet 0    rosuvastatin (CRESTOR) 40 MG tablet Take 1 tablet (40 mg) by mouth daily. 90 tablet 3    tamsulosin (FLOMAX) 0.4 MG capsule Take 1 capsule (0.4 mg) by mouth daily. 90 capsule 3    vitamin D3 (CHOLECALCIFEROL) 50 mcg (2000 units)  tablet 1 tablet.       Social History     Tobacco Use    Smoking status: Never    Smokeless tobacco: Never   Substance Use Topics    Alcohol use: Never       OBJECTIVE  /59   Pulse 67   Temp 97.2  F (36.2  C) (Tympanic)   Resp 20   Wt 97.9 kg (215 lb 12.8 oz)   SpO2 98%   BMI 34.60 kg/m      Physical Exam  Vitals and nursing note reviewed.   Constitutional:       General: He is not in acute distress.     Appearance: Normal appearance. He is obese. He is not ill-appearing.   HENT:      Head: Normocephalic and atraumatic.      Right Ear: Ear canal and external ear normal.      Left Ear: Ear canal and external ear normal.      Ears:      Comments: Right TM with small hole in TM.  Bilateral TM erythematous.     Nose: Nose normal.      Mouth/Throat:      Mouth: Mucous membranes are moist.      Pharynx: Oropharynx is clear.   Eyes:      Extraocular Movements: Extraocular movements intact.      Conjunctiva/sclera: Conjunctivae normal.   Cardiovascular:      Rate and Rhythm: Normal rate and regular rhythm.      Pulses: Normal pulses.      Heart sounds: Normal heart sounds.   Pulmonary:      Effort: Pulmonary effort is normal.      Breath sounds: Normal breath sounds.   Musculoskeletal:      Cervical back: Normal range of motion and neck supple. No rigidity. No muscular tenderness.   Skin:     General: Skin is warm and dry.   Neurological:      General: No focal deficit present.      Mental Status: He is alert.   Psychiatric:         Mood and Affect: Mood normal.         Behavior: Behavior normal.         Labs:  No results found for this or any previous visit (from the past 24 hours).    ASSESSMENT:      ICD-10-CM    1. Acute otitis media with perforated tympanic membrane, right  H66.91 ciprofloxacin-dexAMETHasone (CIPRODEX) 0.3-0.1 % otic suspension    H72.91 amoxicillin-clavulanate (AUGMENTIN) 875-125 MG tablet      2. OME (otitis media with effusion), left  H65.92 amoxicillin-clavulanate (AUGMENTIN) 875-125 MG  tablet           Medical Decision Making:    Differential Diagnosis:  URI Adult/Peds:  Acute right otitis media and Acute left otitis media    Serious Comorbid Conditions:  Adult:   reviewed    PLAN:    Rx for ciprodex and augmentin.  Discussed reasons to seek immediate medical attention.  Additionally if no improvement or worsening in one week, may follow up with PCP and/or UC.  No water in ear for 1 month. Discussed the implications of perforation.        Followup:    If not improving or if condition worsens, follow up with your Primary Care Provider, If not improving or if conditions worsens over the next 12-24 hours, go to the Emergency Department    There are no Patient Instructions on file for this visit.

## 2025-02-27 ENCOUNTER — MYC MEDICAL ADVICE (OUTPATIENT)
Dept: FAMILY MEDICINE | Facility: CLINIC | Age: 65
End: 2025-02-27
Payer: COMMERCIAL

## 2025-03-03 ENCOUNTER — TELEPHONE (OUTPATIENT)
Dept: UROLOGY | Facility: CLINIC | Age: 65
End: 2025-03-03
Payer: COMMERCIAL

## 2025-03-03 NOTE — TELEPHONE ENCOUNTER
[facility] Call Center    Phone Message    May a detailed message be left on voicemail: yes     Reason for Call: Other: patient called in wondering if Dr Norton performs the REZUM procedure for enlarged prostate? Please review and call him back to discuss.     Action Taken: Message routed to:  [facility] uro    Travel Screening: Not Applicable     Date of Service:

## 2025-03-05 ENCOUNTER — TRANSFERRED RECORDS (OUTPATIENT)
Dept: HEALTH INFORMATION MANAGEMENT | Facility: CLINIC | Age: 65
End: 2025-03-05
Payer: COMMERCIAL

## 2025-03-06 NOTE — TELEPHONE ENCOUNTER
Left message for patient. Dr. Norton does not frequently offer this procedure, but we are happy to see him for a consult to discuss other surgical options if he would like. Provided RN number.     ARNALDO Valdez  Care Coordinator- Urology   [phone]

## 2025-03-13 NOTE — TELEPHONE ENCOUNTER
MEDICAL RECORDS REQUEST   [facility]  Urology Clinic  [address] [place] [address]  PHONE: [phone]  Fax: [phone]        FUTURE VISIT INFORMATION                                                   Sarkis Darnell, : 1960 scheduled for future visit at [facility]    APPOINTMENT INFORMATION:  Date: 04/15/2025  Provider:  Sivakumar Norton MD  Reason for Visit/Diagnosis: BPH      RECORDS REQUESTED FOR VISIT                                                     NOTES  STATUS/DETAILS   OFFICE NOTE from other specialist         Media Tab yes, 2024 -- Alejandro Lewis MD @ [facility]  10/08/2024 -- Luigi Brown MD @ [place] [facility]    MEDICATION LIST  yes   LABS     URINALYSIS ([facility])  yes   IMAGES  yes, 2024, 10/14/2023 -- CT ABD PELVIS  09/15/2021, 2020, 2018 -- MR PROSTATE   PSA  yes     PRE-VISIT CHECKLIST      Joint diagnostic appointment coordinated correctly          (ensure right order & amount of time) Yes   RECORD COLLECTION COMPLETE Yes

## 2025-03-29 ENCOUNTER — HEALTH MAINTENANCE LETTER (OUTPATIENT)
Age: 65
End: 2025-03-29

## 2025-04-03 ENCOUNTER — TELEPHONE (OUTPATIENT)
Dept: UROLOGY | Facility: CLINIC | Age: 65
End: 2025-04-03
Payer: COMMERCIAL

## 2025-04-03 NOTE — TELEPHONE ENCOUNTER
"Reason for visit: BPH - consult for REZUM procedure.     Relevant information: Previously followed for elevated PSA and ED.  Most recent visits with Urology Luigi Brown are unable to be viewed due to \"claims derived visit\"     Records: IN [facility], CARE EVERYWHERE, AND PACS     Rooming:   ANTHONY and JUAN assigned in pre-visits   Jena Owens    "

## 2025-04-03 NOTE — TELEPHONE ENCOUNTER
Left message for patient regarding upcoming appointment with Dr. Norton. Incorrectly scheduled as in person during virtual time.     RN changed appointment to virtual and advised patient to call back if this is not acceptable. Provided direct number.     ARNALDO Valdez  Care Coordinator- Urology   [phone]

## 2025-04-15 ENCOUNTER — PRE VISIT (OUTPATIENT)
Dept: UROLOGY | Facility: CLINIC | Age: 65
End: 2025-04-15

## 2025-04-15 ENCOUNTER — VIRTUAL VISIT (OUTPATIENT)
Dept: UROLOGY | Facility: CLINIC | Age: 65
End: 2025-04-15
Payer: COMMERCIAL

## 2025-04-15 DIAGNOSIS — N40.0 BENIGN NON-NODULAR PROSTATIC HYPERPLASIA WITHOUT LOWER URINARY TRACT SYMPTOMS: Primary | ICD-10-CM

## 2025-04-15 NOTE — PROGRESS NOTES
Virtual Visit Details    Type of service:  Video Visit   Video Start Time:  10:00  Video End Time: 10:30    Originating Location (pt. Location): Home    Distant Location (provider location):  Off-site  Platform used for Video Visit: [facility]        UROLOGY OUTPATIENT VISIT     ASSESSMENT/PLAN   64 year old year old being seen today for progressive lower urinary tract symptoms believed to be secondary to an enlarged prostate  - We reviewed management options including conservative measures, medical therapy, minimally invasive treatments such as Rezum, UroLift, PAE, as well as ablative and resection type surgeries.  -His preference would be to pursue PAE, information provided, he will schedule appointment      CHIEF COMPLAINT   Enlarged Prostate      Synopsis    Sarkis Darnell is a very pleasant AGE: 64 year old year old person    He has a longstanding history of elevated PSA s/p neg biopsy in the 2010s.  Has had MRI imaigng, last 2021 with 57 gm gland and no suspicious lesions.  More recently had CT scan showing enlarged prostate with bladder wall thickening. On my own personal review of imaging I measure the prostate was 75 gm  Has been on medical therapy several years with finasteride and flomax and was recently recommended to consider surgery.    Takes plavix for cardiac stent    Did not tolerate urocuff when performed at outside clinic, this resulted in bleeding.    Reportedly had cystoscopy which did not reveal any bladder pathology but did show an enlarged prostate    Flomax causes anejaculation    Tried cialis - headache    Per last records available from [place] [facility] clinic  1/11/22: Former patient of Dr. Gonzalez and Dr. Linares with [facility] urology here to establish care with a new urologist. He has a long history of enlarged prostate and elevated PSA. I reviewed extensive clinic notes from both of his prior urologist, MRI reports from 9/15/2021 and 5/17/2020 showing enlarged prostate of 57 g with  no suspicious lesions. Reviewed the biopsy report from Dr. Gonzalez dated 9/25/2015 showing benign tissue. Historically, PSA was as high as 11.5 on 3/4/2020 and then after starting finasteride PSA came down to 8.9 in April 2020 and 4.7 on 9/8/2021. However, PSA today in our office is 14.0. No family history of prostate cancer. Urinary symptoms are well controlled on tamsulosin 0.4 mg and finasteride 5 mg daily. UA symptom score 4, bother 3. He also has erectile dysfunction and has been using low-dose Trimix prescribed by Dr. Gonzalez, and patient is requesting a renewal of this. He did not tolerate PDEI due to headache side effects. Urinalysis today normal. Postvoid residual 17 mL.  03/08/22: He returns today because PSA last visit was 14.0. Follow-up PSA today is down to 4.0 (8.0 adjusted). Symptoms are well controlled on tamsulosin and finasteride.  3/21/23: Overall he is doing quite well. He does have occasional double voiding in the morning. Frequency every 3-4 hours during the day. PSA today is up to 8.2 (16.4 adjusted). AUA symptom score 9, bother 3. Postvoid residual 0 mL.  6/8/23: Recheck PSA today is down to 2.6 (5.2 adjusted). He is doing well on tamsulosin and finasteride. AUA symptom score 2, bother 1  7/9/24: PSA today is up to 4.9 (9.8 adjusted). He reports progressive symptoms of frequency, urgency, double voiding and difficulty emptying. AUA symptom score 10, bother 3. Postvoid residual 23 mL. He is also interested in discussing his issues with erectile dysfunction. He was unable to tolerate phosphodiesterase inhibitors because of headaches. He was prescribed Trimix in the past but has not used this. He remains on Plavix for history of heart surgery.  10/4/24: Telephone visit to address questions for upcoming appt next week. He had pain and hematuria from Urocuff study and is upset. This has resolved.  10/8/24: Cuff pressure flow study reviewed from 7/11/2024 shows obstructive voiding pattern with PFS  score 43%, ESTEPHANIE score 10%, voided volume of 298 mL with postvoid residual of 319 mL. He is here today for cystoscopy and TRUS volume study and discussion about additional treatment options for his chronic BPH symptoms. PSA today is 4.2 (8.4 adjusted).          Medications     Current Outpatient Medications   Medication Sig Dispense Refill    albuterol (VENTOLIN HFA) 108 (90 Base) MCG/ACT inhaler 1-2 puffs.      benzonatate (TESSALON) 200 MG capsule Take 1 capsule (200 mg) by mouth 3 times daily as needed for cough. 30 capsule 0    clopidogrel (PLAVIX) 75 MG tablet Take 1 tablet (75 mg) by mouth daily. 90 tablet 3    famotidine (PEPCID) 20 MG tablet 1 TABLET 30 MINUTES BEFORE BEDTIME. ONCE A DAY ORALLY FOR 90 DAYS      finasteride (PROSCAR) 5 MG tablet Take 1 tablet (5 mg) by mouth daily. 90 tablet 3    levothyroxine (SYNTHROID/LEVOTHROID) 88 MCG tablet Take 1 tablet (88 mcg) by mouth daily. 90 tablet 3    lidocaine (LIDODERM) 5 % patch Place 1 patch over 12 hours onto the skin every 24 hours. To prevent lidocaine toxicity, patient should be patch free for 12 hrs daily. 15 patch 0    melatonin 1 MG CAPS       metoprolol succinate ER (TOPROL XL) 25 MG 24 hr tablet Take 1 tablet (25 mg) by mouth daily. 90 tablet 3    nitroGLYcerin (NITROSTAT) 0.4 MG sublingual tablet For chest pain place 1 tablet under the tongue every 5 minutes for 3 doses. If symptoms persist 5 minutes after 2nd dose call 911. 30 tablet 0    rosuvastatin (CRESTOR) 40 MG tablet Take 1 tablet (40 mg) by mouth daily. 90 tablet 3    tamsulosin (FLOMAX) 0.4 MG capsule Take 1 capsule (0.4 mg) by mouth daily. 90 capsule 3    vitamin D3 (CHOLECALCIFEROL) 50 mcg (2000 units) tablet 1 tablet.       No current facility-administered medications for this visit.         The following  distinct labs were reviewed    I personally reviewed all applicable laboratory data and went over findings with patient  Significant for:    CBC RESULTS:  Recent Labs   Lab Test  "12/27/24  0202 12/23/24  0900 12/21/23  1103 06/23/23  0830   WBC 6.4 7.1 8.0 8.7   HGB 13.9 13.8 14.3 13.9    231 229 231        BMP RESULTS:  Recent Labs   Lab Test 12/27/24  0202 12/23/24  0900 12/21/23  1103 06/23/23  0830 11/29/22  1211 05/03/21  1003 06/17/19  1624 11/21/17  0905    141 138 141   < > 137 140 140   POTASSIUM 4.2 4.4 4.4 4.0   < > 4.1 4.1 4.1   CHLORIDE 103 106 103 106   < > 106 105 107   CO2 26 25 26 24   < > 26 23 25   ANIONGAP 9 10 9 11   < > 5 12 8   * 121* 114* 114*   < > 108* 90 95   BUN 10.8 12.9 13.3 13.0   < > 20 11 15   CR 1.10 1.05 1.11 1.04   < > 1.08 0.94 0.97   GFRESTIMATED 75 79 75 81   < > 73 88 80   GFRESTBLACK  --   --   --   --   --  84 >90 >90    < > = values in this interval not displayed.       CALCIUM RESULTS:  Recent Labs   Lab Test 12/27/24  0202 12/23/24  0900 12/21/23  1103 06/23/23  0830   FLOR 9.0 9.6 9.3 9.0       PTH RESULTS:  No results for input(s): \"PTHI\" in the last 66536 hours.    HGB A1C RESULTS:  Lab Results   Component Value Date    A1C 6.5 12/23/2024    A1C 6.1 12/21/2023       UA RESULTS:   Recent Labs   Lab Test 12/27/24  0229 01/28/21  1024 03/04/20  1103   SG 1.019 >1.030 1.015   URINEPH 7.0 6.0 7.0   NITRITE Negative Negative Negative   RBCU 2  --   --    WBCU 1  --   --        PSA RESULTS  PSA   Date Value Ref Range Status   01/07/2021 9.71 (H) 0 - 4 ug/L Final     Comment:     Assay Method:  Chemiluminescence using Siemens Vista analyzer   04/16/2020 8.98 (H) 0 - 4 ug/L Final     Comment:     Assay Method:  Chemiluminescence using Siemens Vista analyzer   11/21/2017 7.07 (H) 0 - 4 ug/L Final     Comment:     Assay Method:  Chemiluminescence using Siemens Vista analyzer   07/29/2015 6.8 ng/mL Final     PSA Tumor Marker   Date Value Ref Range Status   09/08/2021 4.70 (H) 0.00 - 4.00 ug/L Final         Recent Imaging Report    I personally reviewed all applicable imaging and went over the below findings with patient.    Results for " orders placed or performed during the hospital encounter of 12/27/24   Chest XR,  PA & LAT    Narrative    EXAM: XR CHEST 2 VIEWS  LOCATION: [facility]  DATE: 12/27/2024    INDICATION: Cough. Chills. Weakness.  COMPARISON: 5/3/2021.    FINDINGS: The heart size is normal. Mild left basilar infiltrate. The lungs are otherwise clear. No pneumothorax or pleural effusion.      Impression    IMPRESSION: Mild left basilar atelectasis and scar.   CT Abdomen Pelvis w Contrast    Narrative    EXAM: CT ABDOMEN PELVIS W CONTRAST  LOCATION: [facility]  DATE: 12/27/2024    INDICATION: left abdominal pain  COMPARISON: 10/14/2023.  TECHNIQUE: CT scan of the abdomen and pelvis was performed following injection of IV contrast. Multiplanar reformats were obtained. Dose reduction techniques were used.  CONTRAST: 109 mL Isovue 370    FINDINGS:   LOWER CHEST: Mild atelectasis at the lung bases.    HEPATOBILIARY: The gallbladder is absent.    PANCREAS: Normal.    SPLEEN: Normal.    ADRENAL GLANDS: Normal.    KIDNEYS/BLADDER: No hydronephrosis. Mild diffuse urinary bladder wall thickening.    BOWEL: There are colonic diverticula without acute diverticulitis. No bowel obstruction or inflammation. The appendix is normal. There is no free intraperitoneal gas or fluid.    LYMPH NODES: Normal.    VASCULATURE: Normal.    PELVIC ORGANS: The prostate gland is enlarged.    MUSCULOSKELETAL: Normal.      Impression    IMPRESSION:   1.  Mild diffuse urinary bladder wall thickening is likely secondary to chronic outlet obstruction from the enlarged prostate gland. There is no hydronephrosis.  2.  Colonic diverticula without acute diverticulitis. No bowel obstruction or inflammation.       CC:  Alejandro Lewis

## 2025-04-15 NOTE — NURSING NOTE
Current patient location: [address] [facility] [address]    Is the patient currently in the state of [place]? YES    Visit mode: VIDEO    If the visit is dropped, the patient can be reconnected by:VIDEO VISIT: Text to cell phone:   Telephone Information:   Mobile [phone]       Will anyone else be joining the visit? NO  (If patient encounters technical issues they should call [phone]    Are changes needed to the allergy or medication list? No, Patient denies any changes since echeck-in regarding medication and allergies and states all information entered during echeck-in remains accurate.      Are refills needed on medications prescribed by this physician? NO    Rooming Documentation:  Not applicable    Reason for visit: Consult    Marychuy JUDD

## 2025-04-17 ENCOUNTER — TELEPHONE (OUTPATIENT)
Dept: UROLOGY | Facility: CLINIC | Age: 65
End: 2025-04-17
Payer: COMMERCIAL

## 2025-04-17 NOTE — TELEPHONE ENCOUNTER
Faxed referral to [facility] for PAE consult. Mychart sent to patient.     ARNALDO Valdez  Care Coordinator- Urology   [phone]

## 2025-05-13 ENCOUNTER — TRANSFERRED RECORDS (OUTPATIENT)
Dept: HEALTH INFORMATION MANAGEMENT | Facility: CLINIC | Age: 65
End: 2025-05-13
Payer: COMMERCIAL

## 2025-06-11 ENCOUNTER — PATIENT OUTREACH (OUTPATIENT)
Dept: GERIATRIC MEDICINE | Facility: CLINIC | Age: 65
End: 2025-06-11
Payer: MEDICARE

## 2025-06-11 NOTE — LETTER
June 11, 2025    EARL CHRISTIANSON  [address]  [place] [address]    Dear  Earl    Welcome to [facility] s MSC+ health program. My name is AMELIE Montes. I am your MSC+ care coordinator. You are eligible for Care Coordination through [facility] MSC+ plan.    As your care coordinator, we ll:  Meet to go over your care coordination benefits  Talk about your physical and mental health care needs   Review your preventative care needs  Create a plan that meets your needs with the services you choose    What happens next?  I ll call you soon to introduce myself and tell you more about my role. We ll then plan time to go over your health and safety needs. Our goal is to keep you as healthy and independent as possible.    [facility]'s MSC+ includes the benefits you may already have from Medical Assistance. Soon, you will receive a new member identification (ID) card from [facility]. Use this card whenever you get health services. If you have Medicare, you will need to show your Medicare card when you get health services.    The [facility]+ care coordination program is voluntary and offered to you at no cost. If you wish to stop being in the care coordination program or have questions, call me at [phone]. If you reach my voicemail, leave a message and your phone number. TTY users, call the [place] [employer] at 907 or [phone] (dmqdyj-li-rpfvvd relay service).      Sincerely,      AMELIE Montes    E-mail: [email or web address]  Phone: [phone]      [facility] [employer]    [id number] accepted   [id number]       [id number] (07/2022)

## 2025-06-11 NOTE — PROGRESS NOTES
[facility] Care Coordination Contact    Member became effective with  [employer] on 6/1/2025 with Josie MSC+.  Previous Health Plan: MA/Fee For Service  Previous Care System: N/A  Previous care coordinators name and number: N/A  Waiver Type: N/A  UTF received: No UTF to request  Mailed welcome letter and Josie When to Contact Your Care Coordinator  Address/Phone discrepancy: N/A  [place] Cho[place] but not prepped for visit, please task CMS if needed.    Nancy Al  Care Management Specialist  [facility]  [phone]

## 2025-06-30 ENCOUNTER — PATIENT OUTREACH (OUTPATIENT)
Dept: GERIATRIC MEDICINE | Facility: CLINIC | Age: 65
End: 2025-06-30
Payer: MEDICARE

## 2025-06-30 NOTE — PROGRESS NOTES
[facility] Care Coordination Contact    Completed 4 attempts to reach client with no response.  Member is officially unable to contact effective today.  If open to elderly waiver complete DTR and MMIS entry as needed.  Completed health plan required [employer] POC.    Follow-up Plan: CC will attempt to reach member in six months.    This CC note routed to PCP, Alejandro Lewis.    Jena Willson, AMELIE  [facility]  [phone]  Fax: [phone]

## 2025-06-30 NOTE — Clinical Note
Dr. Lewis,  I am the [facility] care coordinator for Sarkis Darnell I was assigned as Darrell's care coordinator through his health plan [facility] this month. I was unable to reach Darrell to schedule an initial in-person home assessment after four attempts. I am required by [facility] to notify you that I was unable to reach him. No further action is required.  All of my documentation can be found in [facility]. Please do not hesitate to contact me with any questions or concerns.  Thank you, Jena Willson, FAMILIA [facility] [phone] Fax: [phone]

## 2025-07-01 ENCOUNTER — PATIENT OUTREACH (OUTPATIENT)
Dept: GERIATRIC MEDICINE | Facility: CLINIC | Age: 65
End: 2025-07-01
Payer: MEDICARE

## 2025-07-01 NOTE — PROGRESS NOTES
[facility] Care Coordination Contact      [facility] Refusal Telephone Assessment    Member refused home visit HRA on 07/01/2025 (reason: independent and not interested).    ER visits: Yes -  [facility]  Hospitalizations: No  Health concerns: None reported  Falls/Injuries: No  ADL/IADL Dependencies: Independent        Member currently receiving the following home care services: NA    Member currently receiving the following community resources:  NA  Informal support(s):  Family    Advanced Care Planning discussion, complete code section.    Health Plan sponsored benefits: [employer] MSC+: Shared information regarding preventative health screening and health plan supplemental benefits/incentives. Reviewed medication disposal form and transition of care member handout.    Follow-Up Plan: Member informed of future contact, plan to f/u with member with a 6 month telephone assessment and offer a home visit.  Contact information shared with member and family, encouraged member to call with any questions or concerns at any time.    This CC note routed to PCP, Alejandro Lewis.    AMELIE Montes  [facility]  [phone]  Fax: [phone]

## 2025-07-01 NOTE — Clinical Note
Dr. Lewis,  I am the [facility] care coordinator for Sarkis Darnell I am required by Darrell's health plan [facility] to notify you that he declined an in initial in-home assessment on 7/1. Darrell shared that he is independent and didn't feel the need for an assessment. No further action is required.  All of my documentation can be found in EPIC. Please do not hesitate to contact me with any questions or concerns.  Thank you, Jena Willson, FAMILIA [facility] [phone] Fax: [phone]

## 2025-07-12 ENCOUNTER — HEALTH MAINTENANCE LETTER (OUTPATIENT)
Age: 65
End: 2025-07-12

## 2025-07-21 ENCOUNTER — MYC MEDICAL ADVICE (OUTPATIENT)
Dept: UROLOGY | Facility: CLINIC | Age: 65
End: 2025-07-21
Payer: MEDICARE

## 2025-07-21 ENCOUNTER — MYC MEDICAL ADVICE (OUTPATIENT)
Dept: FAMILY MEDICINE | Facility: CLINIC | Age: 65
End: 2025-07-21
Payer: MEDICARE

## 2025-07-21 DIAGNOSIS — E11.9 TYPE 2 DIABETES MELLITUS WITHOUT COMPLICATION, WITHOUT LONG-TERM CURRENT USE OF INSULIN (H): Primary | ICD-10-CM

## 2025-07-21 DIAGNOSIS — N40.0 BENIGN NON-NODULAR PROSTATIC HYPERPLASIA WITHOUT LOWER URINARY TRACT SYMPTOMS: Primary | ICD-10-CM

## 2025-08-02 ENCOUNTER — LAB (OUTPATIENT)
Dept: LAB | Facility: CLINIC | Age: 65
End: 2025-08-02
Payer: MEDICARE

## 2025-08-02 DIAGNOSIS — E11.9 TYPE 2 DIABETES MELLITUS WITHOUT COMPLICATION, WITHOUT LONG-TERM CURRENT USE OF INSULIN (H): ICD-10-CM

## 2025-08-02 LAB
EST. AVERAGE GLUCOSE BLD GHB EST-MCNC: 131 MG/DL
HBA1C MFR BLD: 6.2 % (ref 0–5.6)

## 2025-08-02 PROCEDURE — 83036 HEMOGLOBIN GLYCOSYLATED A1C: CPT

## 2025-08-02 PROCEDURE — 36415 COLL VENOUS BLD VENIPUNCTURE: CPT

## 2025-08-05 ENCOUNTER — TRANSFERRED RECORDS (OUTPATIENT)
Dept: HEALTH INFORMATION MANAGEMENT | Facility: CLINIC | Age: 65
End: 2025-08-05
Payer: MEDICARE

## (undated) DEVICE — TOTE ANGIO CORP PC15AT SAN32CC83O

## (undated) DEVICE — INTRO SHEATH 4FRX10CM PINNACLE RSS402

## (undated) DEVICE — SU VICRYL 0 UR-6 27" J603H

## (undated) DEVICE — CATH ANGIO INFINITI 3DRC 4FRX100CM 538476

## (undated) DEVICE — RAD INFLATOR BASIC COMPAK  IN4130

## (undated) DEVICE — DEVICE SUTURE GRASPER TROCAR CLOSURE 14GA PMITCSG

## (undated) DEVICE — CATH BALLOON EMERGE 2.5X15MM H7493918915250

## (undated) DEVICE — CATH DIAG 4FR JL 4.5 538417

## (undated) DEVICE — BLADE CLIPPER 4406

## (undated) DEVICE — GUIDEWIRE VASC 0.014INX180CM RUNTHROUGH 25-1011

## (undated) DEVICE — MANIFOLD KIT ANGIO AUTOMATED 014613

## (undated) DEVICE — CATH LAUNCHER 6FR EBU 3.5 LA6EBU35

## (undated) DEVICE — Device

## (undated) DEVICE — ESU HOLDER LAP INST DISP PURPLE LONG 330MM H-PRO-330

## (undated) DEVICE — KIT HAND CONTROL ANGIOTOUCH ACIST 65CM AT-P65

## (undated) DEVICE — GLOVE PROTEXIS BLUE W/NEU-THERA 7.5  2D73EB75

## (undated) DEVICE — CATH BALLOON NC EMERGE 2.75X20MM H7493926720270

## (undated) DEVICE — SU VICRYL 4-0 PS-2 18" UND J496H

## (undated) DEVICE — ENDO TROCAR FIRST ENTRY KII FIOS Z-THRD 05X100MM CTF03

## (undated) DEVICE — LINEN TOWEL PACK X5 5464

## (undated) DEVICE — ESU GROUND PAD UNIVERSAL W/O CORD

## (undated) DEVICE — PACK LAP CHOLE SLC15LCFSD

## (undated) DEVICE — CATH BALLOON NC EMERGE 3.50X20MM H7493926720350

## (undated) DEVICE — PREP CHLORAPREP 26ML TINTED ORANGE  260815

## (undated) DEVICE — CATH BALLOON NC EMERGE 3.00X20MM H7493926720300

## (undated) DEVICE — KIT LG BORE TOUHY ACCESS PLUS MAP152

## (undated) DEVICE — ENDO TROCAR SLEEVE KII Z-THREADED 05X100MM CTS02

## (undated) DEVICE — ENDO TROCAR FIRST ENTRY KII FIOS Z-THRD 11X100MM CTF33

## (undated) DEVICE — SOL WATER IRRIG 1000ML BOTTLE 2F7114

## (undated) DEVICE — BNDG ABDOMINAL BINDER 9X45-62" 79-89071

## (undated) DEVICE — ENDO SCOPE WARMER LF TM500

## (undated) DEVICE — GLOVE PROTEXIS W/NEU-THERA 7.5  2D73TE75

## (undated) DEVICE — CATH LAUNCHER 6FR LA6EBU375

## (undated) DEVICE — DEFIB PRO-PADZ LVP LQD GEL ADULT 8900-2105-01

## (undated) DEVICE — NDL PERC ENTRY THINWALL 18GA 7.0" G00166

## (undated) DEVICE — INTRODUCER SHEATH GREEN 6.5FRX11CM .038IN PSI-6F-11-038ACT

## (undated) RX ORDER — GLYCOPYRROLATE 0.2 MG/ML
INJECTION, SOLUTION INTRAMUSCULAR; INTRAVENOUS
Status: DISPENSED
Start: 2021-01-08

## (undated) RX ORDER — LIDOCAINE HYDROCHLORIDE 10 MG/ML
INJECTION, SOLUTION EPIDURAL; INFILTRATION; INTRACAUDAL; PERINEURAL
Status: DISPENSED
Start: 2023-06-23

## (undated) RX ORDER — DEXAMETHASONE SODIUM PHOSPHATE 4 MG/ML
INJECTION, SOLUTION INTRA-ARTICULAR; INTRALESIONAL; INTRAMUSCULAR; INTRAVENOUS; SOFT TISSUE
Status: DISPENSED
Start: 2021-01-08

## (undated) RX ORDER — HEPARIN SODIUM 200 [USP'U]/100ML
INJECTION, SOLUTION INTRAVENOUS
Status: DISPENSED
Start: 2023-06-23

## (undated) RX ORDER — HEPARIN SODIUM 1000 [USP'U]/ML
INJECTION, SOLUTION INTRAVENOUS; SUBCUTANEOUS
Status: DISPENSED
Start: 2023-06-23

## (undated) RX ORDER — ACETAMINOPHEN 500 MG
TABLET ORAL
Status: DISPENSED
Start: 2023-06-23

## (undated) RX ORDER — FENTANYL CITRATE 0.05 MG/ML
INJECTION, SOLUTION INTRAMUSCULAR; INTRAVENOUS
Status: DISPENSED
Start: 2021-01-08

## (undated) RX ORDER — FENTANYL CITRATE 50 UG/ML
INJECTION, SOLUTION INTRAMUSCULAR; INTRAVENOUS
Status: DISPENSED
Start: 2021-01-08

## (undated) RX ORDER — ONDANSETRON 2 MG/ML
INJECTION INTRAMUSCULAR; INTRAVENOUS
Status: DISPENSED
Start: 2021-01-08

## (undated) RX ORDER — HYDROMORPHONE HYDROCHLORIDE 1 MG/ML
INJECTION, SOLUTION INTRAMUSCULAR; INTRAVENOUS; SUBCUTANEOUS
Status: DISPENSED
Start: 2021-01-08

## (undated) RX ORDER — BUPIVACAINE HYDROCHLORIDE AND EPINEPHRINE 5; 5 MG/ML; UG/ML
INJECTION, SOLUTION EPIDURAL; INTRACAUDAL; PERINEURAL
Status: DISPENSED
Start: 2021-01-08

## (undated) RX ORDER — OXYCODONE HYDROCHLORIDE 5 MG/1
TABLET ORAL
Status: DISPENSED
Start: 2023-06-23

## (undated) RX ORDER — FENTANYL CITRATE 50 UG/ML
INJECTION, SOLUTION INTRAMUSCULAR; INTRAVENOUS
Status: DISPENSED
Start: 2023-06-23

## (undated) RX ORDER — HYDROCODONE BITARTRATE AND ACETAMINOPHEN 5; 325 MG/1; MG/1
TABLET ORAL
Status: DISPENSED
Start: 2021-01-08

## (undated) RX ORDER — CEFAZOLIN SODIUM 2 G/100ML
INJECTION, SOLUTION INTRAVENOUS
Status: DISPENSED
Start: 2021-01-08

## (undated) RX ORDER — LIDOCAINE HYDROCHLORIDE 20 MG/ML
INJECTION, SOLUTION EPIDURAL; INFILTRATION; INTRACAUDAL; PERINEURAL
Status: DISPENSED
Start: 2021-01-08

## (undated) RX ORDER — KETOROLAC TROMETHAMINE 15 MG/ML
INJECTION, SOLUTION INTRAMUSCULAR; INTRAVENOUS
Status: DISPENSED
Start: 2021-01-08